# Patient Record
Sex: MALE | Race: BLACK OR AFRICAN AMERICAN | Employment: FULL TIME | URBAN - METROPOLITAN AREA
[De-identification: names, ages, dates, MRNs, and addresses within clinical notes are randomized per-mention and may not be internally consistent; named-entity substitution may affect disease eponyms.]

---

## 2021-01-19 ENCOUNTER — HOSPITAL ENCOUNTER (EMERGENCY)
Age: 46
Discharge: HOME | End: 2021-01-19
Attending: EMERGENCY MEDICINE

## 2021-01-19 VITALS
TEMPERATURE: 97.9 F | BODY MASS INDEX: 32.95 KG/M2 | OXYGEN SATURATION: 100 % | WEIGHT: 205 LBS | SYSTOLIC BLOOD PRESSURE: 130 MMHG | HEIGHT: 66 IN | HEART RATE: 96 BPM | DIASTOLIC BLOOD PRESSURE: 79 MMHG | RESPIRATION RATE: 11 BRPM

## 2021-01-19 DIAGNOSIS — E86.1 HYPOTENSION DUE TO HYPOVOLEMIA: ICD-10-CM

## 2021-01-19 DIAGNOSIS — R11.2 NAUSEA VOMITING AND DIARRHEA: Primary | ICD-10-CM

## 2021-01-19 DIAGNOSIS — E86.0 DEHYDRATION: ICD-10-CM

## 2021-01-19 DIAGNOSIS — R55 SYNCOPE AND COLLAPSE: ICD-10-CM

## 2021-01-19 DIAGNOSIS — I95.89 HYPOTENSION DUE TO HYPOVOLEMIA: ICD-10-CM

## 2021-01-19 DIAGNOSIS — R19.7 NAUSEA VOMITING AND DIARRHEA: Primary | ICD-10-CM

## 2021-01-19 LAB
ALBUMIN SERPL-MCNC: 3.6 G/DL (ref 3.4–5)
ALBUMIN/GLOB SERPL: 0.9 {RATIO} (ref 0.8–1.7)
ALP SERPL-CCNC: 118 U/L (ref 45–117)
ALT SERPL-CCNC: 30 U/L (ref 16–61)
ANION GAP SERPL CALC-SCNC: 3 MMOL/L (ref 3–18)
APPEARANCE UR: CLEAR
AST SERPL-CCNC: 16 U/L (ref 10–38)
ATRIAL RATE: 111 BPM
BASOPHILS # BLD: 0 K/UL (ref 0–0.1)
BASOPHILS NFR BLD: 0 % (ref 0–2)
BILIRUB SERPL-MCNC: 0.4 MG/DL (ref 0.2–1)
BILIRUB UR QL: NEGATIVE
BUN SERPL-MCNC: 14 MG/DL (ref 7–18)
BUN/CREAT SERPL: 15 (ref 12–20)
CALCIUM SERPL-MCNC: 8.9 MG/DL (ref 8.5–10.1)
CALCULATED P AXIS, ECG09: 63 DEGREES
CALCULATED R AXIS, ECG10: 12 DEGREES
CALCULATED T AXIS, ECG11: 79 DEGREES
CHLORIDE SERPL-SCNC: 106 MMOL/L (ref 100–111)
CK MB CFR SERPL CALC: NORMAL % (ref 0–4)
CK MB SERPL-MCNC: <1 NG/ML (ref 5–25)
CK SERPL-CCNC: 123 U/L (ref 39–308)
CO2 SERPL-SCNC: 30 MMOL/L (ref 21–32)
COLOR UR: YELLOW
CREAT SERPL-MCNC: 0.94 MG/DL (ref 0.6–1.3)
DIAGNOSIS, 93000: NORMAL
DIFFERENTIAL METHOD BLD: ABNORMAL
EOSINOPHIL # BLD: 0.1 K/UL (ref 0–0.4)
EOSINOPHIL NFR BLD: 1 % (ref 0–5)
ERYTHROCYTE [DISTWIDTH] IN BLOOD BY AUTOMATED COUNT: 12.8 % (ref 11.6–14.5)
GLOBULIN SER CALC-MCNC: 3.8 G/DL (ref 2–4)
GLUCOSE BLD STRIP.AUTO-MCNC: 268 MG/DL (ref 70–110)
GLUCOSE SERPL-MCNC: 238 MG/DL (ref 74–99)
GLUCOSE UR STRIP.AUTO-MCNC: 500 MG/DL
HCT VFR BLD AUTO: 46.9 % (ref 36–48)
HGB BLD-MCNC: 16.3 G/DL (ref 13–16)
HGB UR QL STRIP: NEGATIVE
KETONES UR QL STRIP.AUTO: NEGATIVE MG/DL
LEUKOCYTE ESTERASE UR QL STRIP.AUTO: NEGATIVE
LYMPHOCYTES # BLD: 2 K/UL (ref 0.9–3.6)
LYMPHOCYTES NFR BLD: 38 % (ref 21–52)
MAGNESIUM SERPL-MCNC: 2.2 MG/DL (ref 1.6–2.6)
MCH RBC QN AUTO: 28 PG (ref 24–34)
MCHC RBC AUTO-ENTMCNC: 34.8 G/DL (ref 31–37)
MCV RBC AUTO: 80.4 FL (ref 74–97)
MONOCYTES # BLD: 0.5 K/UL (ref 0.05–1.2)
MONOCYTES NFR BLD: 9 % (ref 3–10)
NEUTS SEG # BLD: 2.6 K/UL (ref 1.8–8)
NEUTS SEG NFR BLD: 52 % (ref 40–73)
NITRITE UR QL STRIP.AUTO: NEGATIVE
P-R INTERVAL, ECG05: 142 MS
PH UR STRIP: 5 [PH] (ref 5–8)
PLATELET # BLD AUTO: 258 K/UL (ref 135–420)
PMV BLD AUTO: 9.9 FL (ref 9.2–11.8)
POTASSIUM SERPL-SCNC: 4.2 MMOL/L (ref 3.5–5.5)
PROT SERPL-MCNC: 7.4 G/DL (ref 6.4–8.2)
PROT UR STRIP-MCNC: NEGATIVE MG/DL
Q-T INTERVAL, ECG07: 318 MS
QRS DURATION, ECG06: 76 MS
QTC CALCULATION (BEZET), ECG08: 432 MS
RBC # BLD AUTO: 5.83 M/UL (ref 4.7–5.5)
SODIUM SERPL-SCNC: 139 MMOL/L (ref 136–145)
SP GR UR REFRACTOMETRY: 1.02 (ref 1–1.03)
TROPONIN I SERPL-MCNC: <0.02 NG/ML (ref 0–0.04)
UROBILINOGEN UR QL STRIP.AUTO: 0.2 EU/DL (ref 0.2–1)
VENTRICULAR RATE, ECG03: 111 BPM
WBC # BLD AUTO: 5.1 K/UL (ref 4.6–13.2)

## 2021-01-19 PROCEDURE — 82962 GLUCOSE BLOOD TEST: CPT

## 2021-01-19 PROCEDURE — 80053 COMPREHEN METABOLIC PANEL: CPT

## 2021-01-19 PROCEDURE — 96361 HYDRATE IV INFUSION ADD-ON: CPT

## 2021-01-19 PROCEDURE — 82553 CREATINE MB FRACTION: CPT

## 2021-01-19 PROCEDURE — 96374 THER/PROPH/DIAG INJ IV PUSH: CPT

## 2021-01-19 PROCEDURE — 93005 ELECTROCARDIOGRAM TRACING: CPT

## 2021-01-19 PROCEDURE — 99285 EMERGENCY DEPT VISIT HI MDM: CPT

## 2021-01-19 PROCEDURE — 81003 URINALYSIS AUTO W/O SCOPE: CPT

## 2021-01-19 PROCEDURE — 74011250636 HC RX REV CODE- 250/636: Performed by: EMERGENCY MEDICINE

## 2021-01-19 PROCEDURE — 85025 COMPLETE CBC W/AUTO DIFF WBC: CPT

## 2021-01-19 PROCEDURE — 83735 ASSAY OF MAGNESIUM: CPT

## 2021-01-19 RX ORDER — ONDANSETRON 8 MG/1
8 TABLET, ORALLY DISINTEGRATING ORAL
Qty: 12 TAB | Refills: 0 | Status: SHIPPED | OUTPATIENT
Start: 2021-01-19 | End: 2022-06-21

## 2021-01-19 RX ORDER — ONDANSETRON 2 MG/ML
4 INJECTION INTRAMUSCULAR; INTRAVENOUS
Status: COMPLETED | OUTPATIENT
Start: 2021-01-19 | End: 2021-01-19

## 2021-01-19 RX ORDER — METFORMIN HYDROCHLORIDE 1000 MG/1
1000 TABLET ORAL 2 TIMES DAILY WITH MEALS
COMMUNITY
End: 2022-01-12

## 2021-01-19 RX ADMIN — ONDANSETRON 4 MG: 2 INJECTION INTRAMUSCULAR; INTRAVENOUS at 16:27

## 2021-01-19 RX ADMIN — SODIUM CHLORIDE 1000 ML: 900 INJECTION, SOLUTION INTRAVENOUS at 14:49

## 2021-01-19 NOTE — ED NOTES
Pt given discharge paperwork by RN, pt verbalizes understanding, pt ambulatory out of ED. PIV taken prior to pt discharge.

## 2021-01-19 NOTE — ED PROVIDER NOTES
EMERGENCY DEPARTMENT HISTORY AND PHYSICAL EXAM    Date: 1/19/2021  Patient Name: Nataly Carl    History of Presenting Illness     Chief Complaint   Patient presents with    Hypertension    High Blood Sugar         History Provided By: Patient    2:16 PM  Nataly Carl is a 39 y.o. male with PMHX of diabetes who presents to the emergency department C/O lightheadedness and syncopal episode. Per patient starting on Friday he felt lightheaded and unwell. He reports Saturday he started having nausea and vomiting and diarrhea. He reports he is continued to feel lightheaded when he stands up and today when he stood up after resting he fainted briefly. He denies any headache, chest pain, shortness of breath, fever, cough, abdominal pain. No known sick contacts or recent travel. Reports has been taking his medications as prescribed. No other exacerbating or relieving factors identified. PCP: None    Current Outpatient Medications   Medication Sig Dispense Refill    metFORMIN (GLUCOPHAGE) 1,000 mg tablet Take 1,000 mg by mouth two (2) times daily (with meals).  ondansetron (ZOFRAN ODT) 8 mg disintegrating tablet Take 1 Tab by mouth every eight (8) hours as needed for Nausea for up to 12 doses. 12 Tab 0       Past History     Past Medical History:  Past Medical History:   Diagnosis Date    Diabetes ([address]     Hypertension        Past Surgical History:  History reviewed. No pertinent surgical history. Family History:  History reviewed. No pertinent family history. Social History:  Social History     Tobacco Use    Smoking status: Never Smoker   Substance Use Topics    Alcohol use: Never     Frequency: Never    Drug use: Never       Allergies:  No Known Allergies      Review of Systems   Review of Systems   Constitutional: Negative for fever. Respiratory: Negative for shortness of breath. Cardiovascular: Negative for chest pain. Gastrointestinal: Positive for diarrhea, nausea and vomiting. Negative for abdominal pain. Neurological: Positive for syncope and light-headedness. All other systems reviewed and are negative.         Physical Exam     Vitals:    01/19/21 1405 01/19/21 1444 01/19/21 1445   BP: 139/81 128/79 127/81   Pulse: (!) 109 (!) 101 (!) 105   Resp: 16 22 20   Temp: 97.9 °F (36.6 °C)     SpO2: 97% 97% 97%   Weight: 93 kg (205 lb)     Height: 5' 6\" (1.676 m)       Physical Exam    Nursing notes and vital signs reviewed    Constitutional: Non toxic appearing, moderate distress  Head: Normocephalic, Atraumatic  Eyes: EOMI  Neck: Supple  Cardiovascular: Tachycardic and regular rhythm, no murmurs, rubs, or gallops  Chest: Normal work of breathing and chest excursion bilaterally  Lungs: Clear to ausculation bilaterally  Abdomen: Soft, non tender, non distended, normoactive bowel sounds  Back: No evidence of trauma or deformity  Extremities: No evidence of trauma or deformity, no LE edema  Skin: Warm and dry, normal cap refill  Neuro: Alert and appropriate, CN intact, normal speech, strength and sensation full and symmetric bilaterally, normal coordination  Psychiatric: Normal mood and affect      Diagnostic Study Results     Labs -     Recent Results (from the past 12 hour(s))   EKG, 12 LEAD, INITIAL    Collection Time: 01/19/21  2:19 PM   Result Value Ref Range    Ventricular Rate 111 BPM    Atrial Rate 111 BPM    P-R Interval 142 ms    QRS Duration 76 ms    Q-T Interval 318 ms    QTC Calculation (Bezet) 432 ms    Calculated P Axis 63 degrees    Calculated R Axis 12 degrees    Calculated T Axis 79 degrees    Diagnosis       Sinus tachycardia  Otherwise normal ECG  No previous ECGs available     GLUCOSE, POC    Collection Time: 01/19/21  2:24 PM   Result Value Ref Range    Glucose (POC) 268 (H) 70 - 110 mg/dL   CBC WITH AUTOMATED DIFF    Collection Time: 01/19/21  2:30 PM   Result Value Ref Range    WBC 5.1 4.6 - 13.2 K/uL    RBC 5.83 (H) 4.70 - 5.50 M/uL    HGB 16.3 (H) 13.0 - 16.0 g/dL HCT 46.9 36.0 - 48.0 %    MCV 80.4 74.0 - 97.0 FL    MCH 28.0 24.0 - 34.0 PG    MCHC 34.8 31.0 - 37.0 g/dL    RDW 12.8 11.6 - 14.5 %    PLATELET 999 699 - 377 K/uL    MPV 9.9 9.2 - 11.8 FL    NEUTROPHILS 52 40 - 73 %    LYMPHOCYTES 38 21 - 52 %    MONOCYTES 9 3 - 10 %    EOSINOPHILS 1 0 - 5 %    BASOPHILS 0 0 - 2 %    ABS. NEUTROPHILS 2.6 1.8 - 8.0 K/UL    ABS. LYMPHOCYTES 2.0 0.9 - 3.6 K/UL    ABS. MONOCYTES 0.5 0.05 - 1.2 K/UL    ABS. EOSINOPHILS 0.1 0.0 - 0.4 K/UL    ABS. BASOPHILS 0.0 0.0 - 0.1 K/UL    DF AUTOMATED     METABOLIC PANEL, COMPREHENSIVE    Collection Time: 01/19/21  2:30 PM   Result Value Ref Range    Sodium 139 136 - 145 mmol/L    Potassium 4.2 3.5 - 5.5 mmol/L    Chloride 106 100 - 111 mmol/L    CO2 30 21 - 32 mmol/L    Anion gap 3 3.0 - 18 mmol/L    Glucose 238 (H) 74 - 99 mg/dL    BUN 14 7.0 - 18 MG/DL    Creatinine 0.94 0.6 - 1.3 MG/DL    BUN/Creatinine ratio 15 12 - 20      GFR est AA >60 >60 ml/min/1.73m2    GFR est non-AA >60 >60 ml/min/1.73m2    Calcium 8.9 8.5 - 10.1 MG/DL    Bilirubin, total 0.4 0.2 - 1.0 MG/DL    ALT (SGPT) 30 16 - 61 U/L    AST (SGOT) 16 10 - 38 U/L    Alk.  phosphatase 118 (H) 45 - 117 U/L    Protein, total 7.4 6.4 - 8.2 g/dL    Albumin 3.6 3.4 - 5.0 g/dL    Globulin 3.8 2.0 - 4.0 g/dL    A-G Ratio 0.9 0.8 - 1.7     MAGNESIUM    Collection Time: 01/19/21  2:30 PM   Result Value Ref Range    Magnesium 2.2 1.6 - 2.6 mg/dL   CARDIAC PANEL,(CK, CKMB & TROPONIN)    Collection Time: 01/19/21  2:30 PM   Result Value Ref Range    CK - MB <1.0 <3.6 ng/ml    CK-MB Index  0.0 - 4.0 %     CALCULATION NOT PERFORMED WHEN RESULT IS BELOW LINEAR LIMIT     39 - 308 U/L    Troponin-I, QT <0.02 0.0 - 0.045 NG/ML   URINALYSIS W/ RFLX MICROSCOPIC    Collection Time: 01/19/21  2:47 PM   Result Value Ref Range    Color YELLOW      Appearance CLEAR      Specific gravity 1.016 1.005 - 1.030      pH (UA) 5.0 5.0 - 8.0      Protein Negative NEG mg/dL    Glucose 500 (A) NEG mg/dL    Ketone Negative NEG mg/dL    Bilirubin Negative NEG      Blood Negative NEG      Urobilinogen 0.2 0.2 - 1.0 EU/dL    Nitrites Negative NEG      Leukocyte Esterase Negative NEG         Radiologic Studies -   No orders to display     CT Results  (Last 48 hours)    None        CXR Results  (Last 48 hours)    None          Medications given in the ED-  Medications   sodium chloride 0.9 % bolus infusion 1,000 mL (1,000 mL IntraVENous New Bag 1/19/21 1449)     Followed by   sodium chloride 0.9 % bolus infusion 1,000 mL (1,000 mL IntraVENous New Bag 1/19/21 1449)   ondansetron (ZOFRAN) injection 4 mg (4 mg IntraVENous Given 1/19/21 1627)         Medical Decision Making   I am the first provider for this patient. I reviewed the vital signs, available nursing notes, past medical history, past surgical history, family history and social history. Vital Signs-Reviewed the patient's vital signs. Pulse Oximetry Analysis - 97% on room air, not hypoxic     Cardiac Monitor:  Rate: 94 bpm  Rhythm: Normal sinus    EKG interpretation: (Preliminary)  EKG read by Dr. Taqueria Garcia at 2:22 PM  Sinus tachycardia at a rate of 111 bpm, CO interval 142 ms, QRS duration 76 ms, no prior available for comparison    Records Reviewed: Nursing Notes    Provider Notes (Medical Decision Making): Hank Medina is a 39 y.o. male presenting with 3 days of nausea, vomiting, diarrhea and a syncopal episode today. Patient orthostatic here consistent with dehydration from his nausea vomiting and diarrhea. Benign abdominal exam. Tolerating p.o. without difficulty. Rehydrated here with normalization of his heart rate. Plan for discharge with symptom management, encouragement of p.o. fluids, early primary care follow-up, and strict return precautions. Patient understands and agrees with this plan. Procedures:  Procedures    ED Course:   4:36 PM  Updated patient on all results and plan. All questions answered.     Diagnosis and Disposition     Critical Care: None    DISCHARGE NOTE:    Gordon Varghese's  results have been reviewed with him. He has been counseled regarding his diagnosis, treatment, and plan. He verbally conveys understanding and agreement of the signs, symptoms, diagnosis, treatment and prognosis and additionally agrees to follow up as discussed. He also agrees with the care-plan and conveys that all of his questions have been answered. I have also provided discharge instructions for him that include: educational information regarding their diagnosis and treatment, and list of reasons why they would want to return to the ED prior to their follow-up appointment, should his condition change. He has been provided with education for proper emergency department utilization. CLINICAL IMPRESSION:    1. Nausea vomiting and diarrhea    2. Syncope and collapse    3. Hypotension due to hypovolemia    4. Dehydration        PLAN:  1. D/C Home  2. Current Discharge Medication List      START taking these medications    Details   ondansetron (ZOFRAN ODT) 8 mg disintegrating tablet Take 1 Tab by mouth every eight (8) hours as needed for Nausea for up to 12 doses. Qty: 12 Tab, Refills: 0           3. Follow-up Information     Follow up With Specialties Details Why Contact Info    [address] [place] [address]  Schedule an appointment as soon as possible for a visit   [id number] [facility], [address] [facility] [address]    [facility] EMERGENCY DEPT Emergency Medicine  If symptoms worsen [address] [id number]  [phone]        _______________________________      Please note that this dictation was completed with [employer], the [employer] voice recognition software. Quite often unanticipated grammatical, syntax, homophones, and other interpretive errors are inadvertently transcribed by the computer software. Please disregard these errors. Please excuse any errors that have escaped final proofreading.

## 2021-01-19 NOTE — LETTER
[facility] [place] 
[facility] 
[address] [address] [phone] Work/School Note Date: 1/19/2021 To Whom It May concern: 
 
Natty Nunez was seen and treated today in the emergency room by the following provider(s): 
Attending Provider: Syl De Oliveira MD. Natty Nunez may return to work on 1/22/2021.  
 
Sincerely, 
 
 
 
 
Mariusz Summers RN

## 2021-12-30 ENCOUNTER — APPOINTMENT (OUTPATIENT)
Dept: GENERAL RADIOLOGY | Age: 46
DRG: 073 | End: 2021-12-30
Attending: STUDENT IN AN ORGANIZED HEALTH CARE EDUCATION/TRAINING PROGRAM
Payer: COMMERCIAL

## 2021-12-30 ENCOUNTER — APPOINTMENT (OUTPATIENT)
Dept: CT IMAGING | Age: 46
DRG: 073 | End: 2021-12-30
Attending: PHYSICIAN ASSISTANT
Payer: COMMERCIAL

## 2021-12-30 ENCOUNTER — HOSPITAL ENCOUNTER (INPATIENT)
Age: 46
LOS: 13 days | Discharge: HOME | DRG: 073 | End: 2022-01-12
Attending: STUDENT IN AN ORGANIZED HEALTH CARE EDUCATION/TRAINING PROGRAM | Admitting: INTERNAL MEDICINE
Payer: COMMERCIAL

## 2021-12-30 DIAGNOSIS — R10.84 DIFFUSE ABDOMINAL PAIN: ICD-10-CM

## 2021-12-30 DIAGNOSIS — N17.9 ACUTE KIDNEY INJURY (HCC): Primary | ICD-10-CM

## 2021-12-30 DIAGNOSIS — E11.65 TYPE 2 DIABETES MELLITUS WITH HYPERGLYCEMIA, WITH LONG-TERM CURRENT USE OF INSULIN (HCC): ICD-10-CM

## 2021-12-30 DIAGNOSIS — Z79.4 TYPE 2 DIABETES MELLITUS WITH HYPERGLYCEMIA, WITH LONG-TERM CURRENT USE OF INSULIN (HCC): ICD-10-CM

## 2021-12-30 DIAGNOSIS — E11.42 DIABETIC POLYNEUROPATHY ASSOCIATED WITH TYPE 2 DIABETES MELLITUS (HCC): ICD-10-CM

## 2021-12-30 DIAGNOSIS — K52.9 COLITIS: ICD-10-CM

## 2021-12-30 PROBLEM — R19.7 DIARRHEA: Status: ACTIVE | Noted: 2021-12-30

## 2021-12-30 PROBLEM — R11.2 NAUSEA & VOMITING: Status: ACTIVE | Noted: 2021-12-30

## 2021-12-30 PROBLEM — E11.9 DIABETES MELLITUS (HCC): Status: ACTIVE | Noted: 2021-12-30

## 2021-12-30 PROBLEM — E11.9 DM (DIABETES MELLITUS) (HCC): Status: ACTIVE | Noted: 2021-12-30

## 2021-12-30 LAB
ALBUMIN SERPL-MCNC: 3.7 G/DL (ref 3.4–5)
ALBUMIN/GLOB SERPL: 0.9 {RATIO} (ref 0.8–1.7)
ALP SERPL-CCNC: 110 U/L (ref 45–117)
ALT SERPL-CCNC: 30 U/L (ref 16–61)
AMPHET UR QL SCN: NEGATIVE
ANION GAP SERPL CALC-SCNC: 10 MMOL/L (ref 3–18)
APPEARANCE UR: CLEAR
AST SERPL-CCNC: 15 U/L (ref 10–38)
BARBITURATES UR QL SCN: NEGATIVE
BASOPHILS # BLD: 0 K/UL (ref 0–0.1)
BASOPHILS NFR BLD: 0 % (ref 0–2)
BENZODIAZ UR QL: NEGATIVE
BILIRUB SERPL-MCNC: 0.6 MG/DL (ref 0.2–1)
BILIRUB UR QL: NEGATIVE
BUN SERPL-MCNC: 35 MG/DL (ref 7–18)
BUN/CREAT SERPL: 10 (ref 12–20)
CALCIUM SERPL-MCNC: 9.3 MG/DL (ref 8.5–10.1)
CANNABINOIDS UR QL SCN: NEGATIVE
CHLORIDE SERPL-SCNC: 106 MMOL/L (ref 100–111)
CO2 SERPL-SCNC: 22 MMOL/L (ref 21–32)
COCAINE UR QL SCN: NEGATIVE
COLOR UR: YELLOW
COVID-19 RAPID TEST, COVR: NOT DETECTED
CREAT SERPL-MCNC: 3.47 MG/DL (ref 0.6–1.3)
DIFFERENTIAL METHOD BLD: ABNORMAL
EOSINOPHIL # BLD: 0 K/UL (ref 0–0.4)
EOSINOPHIL NFR BLD: 0 % (ref 0–5)
ERYTHROCYTE [DISTWIDTH] IN BLOOD BY AUTOMATED COUNT: 12.7 % (ref 11.6–14.5)
EST. AVERAGE GLUCOSE BLD GHB EST-MCNC: 235 MG/DL
GLOBULIN SER CALC-MCNC: 4 G/DL (ref 2–4)
GLUCOSE BLD STRIP.AUTO-MCNC: 220 MG/DL (ref 70–110)
GLUCOSE SERPL-MCNC: 265 MG/DL (ref 74–99)
GLUCOSE UR STRIP.AUTO-MCNC: 500 MG/DL
HBA1C MFR BLD: 9.8 % (ref 4.2–5.6)
HCT VFR BLD AUTO: 42 % (ref 36–48)
HDSCOM,HDSCOM: ABNORMAL
HGB BLD-MCNC: 14.4 G/DL (ref 13–16)
HGB UR QL STRIP: NEGATIVE
IMM GRANULOCYTES # BLD AUTO: 0.1 K/UL (ref 0–0.04)
IMM GRANULOCYTES NFR BLD AUTO: 0 % (ref 0–0.5)
KETONES UR QL STRIP.AUTO: 15 MG/DL
LEUKOCYTE ESTERASE UR QL STRIP.AUTO: NEGATIVE
LIPASE SERPL-CCNC: 75 U/L (ref 73–393)
LYMPHOCYTES # BLD: 1.2 K/UL (ref 0.9–3.6)
LYMPHOCYTES NFR BLD: 11 % (ref 21–52)
MCH RBC QN AUTO: 26.7 PG (ref 24–34)
MCHC RBC AUTO-ENTMCNC: 34.3 G/DL (ref 31–37)
MCV RBC AUTO: 77.8 FL (ref 78–100)
METHADONE UR QL: NEGATIVE
MONOCYTES # BLD: 0.4 K/UL (ref 0.05–1.2)
MONOCYTES NFR BLD: 3 % (ref 3–10)
NEUTS SEG # BLD: 9.7 K/UL (ref 1.8–8)
NEUTS SEG NFR BLD: 85 % (ref 40–73)
NITRITE UR QL STRIP.AUTO: NEGATIVE
NRBC # BLD: 0 K/UL (ref 0–0.01)
NRBC BLD-RTO: 0 PER 100 WBC
OPIATES UR QL: POSITIVE
PCP UR QL: NEGATIVE
PH UR STRIP: 5 [PH] (ref 5–8)
PLATELET # BLD AUTO: 315 K/UL (ref 135–420)
PMV BLD AUTO: 9.7 FL (ref 9.2–11.8)
POTASSIUM SERPL-SCNC: 4.3 MMOL/L (ref 3.5–5.5)
PROT SERPL-MCNC: 7.7 G/DL (ref 6.4–8.2)
PROT UR STRIP-MCNC: NEGATIVE MG/DL
RBC # BLD AUTO: 5.4 M/UL (ref 4.35–5.65)
SODIUM SERPL-SCNC: 138 MMOL/L (ref 136–145)
SOURCE, COVRS: NORMAL
SP GR UR REFRACTOMETRY: 1.01 (ref 1–1.03)
TROPONIN-HIGH SENSITIVITY: 6 NG/L (ref 0–78)
UROBILINOGEN UR QL STRIP.AUTO: 0.2 EU/DL (ref 0.2–1)
WBC # BLD AUTO: 11.3 K/UL (ref 4.6–13.2)

## 2021-12-30 PROCEDURE — 96374 THER/PROPH/DIAG INJ IV PUSH: CPT

## 2021-12-30 PROCEDURE — 74011250636 HC RX REV CODE- 250/636: Performed by: INTERNAL MEDICINE

## 2021-12-30 PROCEDURE — 80053 COMPREHEN METABOLIC PANEL: CPT

## 2021-12-30 PROCEDURE — 65270000029 HC RM PRIVATE

## 2021-12-30 PROCEDURE — 74011000250 HC RX REV CODE- 250: Performed by: INTERNAL MEDICINE

## 2021-12-30 PROCEDURE — 87635 SARS-COV-2 COVID-19 AMP PRB: CPT

## 2021-12-30 PROCEDURE — 84484 ASSAY OF TROPONIN QUANT: CPT

## 2021-12-30 PROCEDURE — 83036 HEMOGLOBIN GLYCOSYLATED A1C: CPT

## 2021-12-30 PROCEDURE — 74011250637 HC RX REV CODE- 250/637: Performed by: INTERNAL MEDICINE

## 2021-12-30 PROCEDURE — 80307 DRUG TEST PRSMV CHEM ANLYZR: CPT

## 2021-12-30 PROCEDURE — 85025 COMPLETE CBC W/AUTO DIFF WBC: CPT

## 2021-12-30 PROCEDURE — 71046 X-RAY EXAM CHEST 2 VIEWS: CPT

## 2021-12-30 PROCEDURE — 99285 EMERGENCY DEPT VISIT HI MDM: CPT

## 2021-12-30 PROCEDURE — 96375 TX/PRO/DX INJ NEW DRUG ADDON: CPT

## 2021-12-30 PROCEDURE — 74011250636 HC RX REV CODE- 250/636: Performed by: PHYSICIAN ASSISTANT

## 2021-12-30 PROCEDURE — 74176 CT ABD & PELVIS W/O CONTRAST: CPT

## 2021-12-30 PROCEDURE — 96361 HYDRATE IV INFUSION ADD-ON: CPT

## 2021-12-30 PROCEDURE — 82962 GLUCOSE BLOOD TEST: CPT

## 2021-12-30 PROCEDURE — 96376 TX/PRO/DX INJ SAME DRUG ADON: CPT

## 2021-12-30 PROCEDURE — 74011636637 HC RX REV CODE- 636/637: Performed by: INTERNAL MEDICINE

## 2021-12-30 PROCEDURE — 81003 URINALYSIS AUTO W/O SCOPE: CPT

## 2021-12-30 PROCEDURE — C9113 INJ PANTOPRAZOLE SODIUM, VIA: HCPCS | Performed by: INTERNAL MEDICINE

## 2021-12-30 PROCEDURE — 93005 ELECTROCARDIOGRAM TRACING: CPT

## 2021-12-30 PROCEDURE — 83690 ASSAY OF LIPASE: CPT

## 2021-12-30 RX ORDER — SODIUM CHLORIDE 0.9 % (FLUSH) 0.9 %
5-40 SYRINGE (ML) INJECTION AS NEEDED
Status: DISCONTINUED | OUTPATIENT
Start: 2021-12-30 | End: 2022-01-12 | Stop reason: HOSPADM

## 2021-12-30 RX ORDER — DEXTROSE 50 % IN WATER (D50W) INTRAVENOUS SYRINGE
25-50 AS NEEDED
Status: DISCONTINUED | OUTPATIENT
Start: 2021-12-30 | End: 2022-01-12 | Stop reason: HOSPADM

## 2021-12-30 RX ORDER — MAGNESIUM SULFATE 100 %
4 CRYSTALS MISCELLANEOUS AS NEEDED
Status: DISCONTINUED | OUTPATIENT
Start: 2021-12-30 | End: 2022-01-12 | Stop reason: HOSPADM

## 2021-12-30 RX ORDER — MORPHINE SULFATE 4 MG/ML
4 INJECTION INTRAVENOUS
Status: COMPLETED | OUTPATIENT
Start: 2021-12-30 | End: 2021-12-30

## 2021-12-30 RX ORDER — ONDANSETRON 2 MG/ML
4 INJECTION INTRAMUSCULAR; INTRAVENOUS
Status: DISCONTINUED | OUTPATIENT
Start: 2021-12-30 | End: 2022-01-12 | Stop reason: HOSPADM

## 2021-12-30 RX ORDER — CIPROFLOXACIN 2 MG/ML
400 INJECTION, SOLUTION INTRAVENOUS EVERY 12 HOURS
Status: DISCONTINUED | OUTPATIENT
Start: 2021-12-30 | End: 2022-01-02

## 2021-12-30 RX ORDER — ONDANSETRON 2 MG/ML
4 INJECTION INTRAMUSCULAR; INTRAVENOUS
Status: COMPLETED | OUTPATIENT
Start: 2021-12-30 | End: 2021-12-30

## 2021-12-30 RX ORDER — SODIUM CHLORIDE 0.9 % (FLUSH) 0.9 %
5-40 SYRINGE (ML) INJECTION EVERY 8 HOURS
Status: DISCONTINUED | OUTPATIENT
Start: 2021-12-30 | End: 2022-01-12 | Stop reason: HOSPADM

## 2021-12-30 RX ORDER — INSULIN LISPRO 100 [IU]/ML
INJECTION, SOLUTION INTRAVENOUS; SUBCUTANEOUS
Status: DISCONTINUED | OUTPATIENT
Start: 2021-12-30 | End: 2022-01-12 | Stop reason: HOSPADM

## 2021-12-30 RX ORDER — PANTOPRAZOLE SODIUM 40 MG/10ML
40 INJECTION, POWDER, LYOPHILIZED, FOR SOLUTION INTRAVENOUS EVERY 12 HOURS
Status: DISCONTINUED | OUTPATIENT
Start: 2021-12-30 | End: 2022-01-03 | Stop reason: ALTCHOICE

## 2021-12-30 RX ORDER — SODIUM CHLORIDE 9 MG/ML
150 INJECTION, SOLUTION INTRAVENOUS ONCE
Status: DISCONTINUED | OUTPATIENT
Start: 2021-12-30 | End: 2021-12-30

## 2021-12-30 RX ORDER — ACETAMINOPHEN 325 MG/1
650 TABLET ORAL
Status: DISCONTINUED | OUTPATIENT
Start: 2021-12-30 | End: 2022-01-12 | Stop reason: HOSPADM

## 2021-12-30 RX ORDER — GABAPENTIN 300 MG/1
300 CAPSULE ORAL 2 TIMES DAILY
Status: DISCONTINUED | OUTPATIENT
Start: 2021-12-31 | End: 2022-01-12

## 2021-12-30 RX ORDER — METOCLOPRAMIDE HYDROCHLORIDE 5 MG/ML
10 INJECTION INTRAMUSCULAR; INTRAVENOUS
Status: COMPLETED | OUTPATIENT
Start: 2021-12-30 | End: 2021-12-30

## 2021-12-30 RX ORDER — ACETAMINOPHEN 650 MG/1
650 SUPPOSITORY RECTAL
Status: DISCONTINUED | OUTPATIENT
Start: 2021-12-30 | End: 2022-01-12 | Stop reason: HOSPADM

## 2021-12-30 RX ORDER — SODIUM CHLORIDE 9 MG/ML
75 INJECTION, SOLUTION INTRAVENOUS CONTINUOUS
Status: DISCONTINUED | OUTPATIENT
Start: 2021-12-30 | End: 2022-01-10

## 2021-12-30 RX ORDER — ONDANSETRON 4 MG/1
4 TABLET, ORALLY DISINTEGRATING ORAL
Status: DISCONTINUED | OUTPATIENT
Start: 2021-12-30 | End: 2022-01-12 | Stop reason: HOSPADM

## 2021-12-30 RX ORDER — ENOXAPARIN SODIUM 100 MG/ML
30 INJECTION SUBCUTANEOUS DAILY
Status: DISCONTINUED | OUTPATIENT
Start: 2021-12-31 | End: 2022-01-10

## 2021-12-30 RX ORDER — METOCLOPRAMIDE HYDROCHLORIDE 5 MG/ML
5 INJECTION INTRAMUSCULAR; INTRAVENOUS EVERY 6 HOURS
Status: DISCONTINUED | OUTPATIENT
Start: 2021-12-30 | End: 2021-12-31

## 2021-12-30 RX ORDER — MORPHINE SULFATE 2 MG/ML
1 INJECTION, SOLUTION INTRAMUSCULAR; INTRAVENOUS
Status: DISCONTINUED | OUTPATIENT
Start: 2021-12-30 | End: 2021-12-31

## 2021-12-30 RX ORDER — INSULIN GLARGINE 100 [IU]/ML
20 INJECTION, SOLUTION SUBCUTANEOUS
Status: DISCONTINUED | OUTPATIENT
Start: 2021-12-31 | End: 2022-01-05

## 2021-12-30 RX ORDER — POLYETHYLENE GLYCOL 3350 17 G/17G
17 POWDER, FOR SOLUTION ORAL DAILY PRN
Status: DISCONTINUED | OUTPATIENT
Start: 2021-12-30 | End: 2022-01-12 | Stop reason: HOSPADM

## 2021-12-30 RX ORDER — FAMOTIDINE 10 MG/ML
20 INJECTION INTRAVENOUS
Status: COMPLETED | OUTPATIENT
Start: 2021-12-30 | End: 2021-12-30

## 2021-12-30 RX ADMIN — MORPHINE SULFATE 1 MG: 2 INJECTION, SOLUTION INTRAMUSCULAR; INTRAVENOUS at 21:33

## 2021-12-30 RX ADMIN — PANTOPRAZOLE SODIUM 40 MG: 40 INJECTION, POWDER, FOR SOLUTION INTRAVENOUS at 21:32

## 2021-12-30 RX ADMIN — INSULIN GLARGINE 20 UNITS: 100 INJECTION, SOLUTION SUBCUTANEOUS at 23:53

## 2021-12-30 RX ADMIN — METOCLOPRAMIDE HYDROCHLORIDE 10 MG: 5 INJECTION INTRAMUSCULAR; INTRAVENOUS at 17:02

## 2021-12-30 RX ADMIN — MORPHINE SULFATE 4 MG: 4 INJECTION INTRAVENOUS at 15:52

## 2021-12-30 RX ADMIN — SODIUM CHLORIDE, PRESERVATIVE FREE 30 ML: 5 INJECTION INTRAVENOUS at 21:33

## 2021-12-30 RX ADMIN — METRONIDAZOLE 250 MG: 500 INJECTION, SOLUTION INTRAVENOUS at 23:02

## 2021-12-30 RX ADMIN — Medication 4 UNITS: at 22:43

## 2021-12-30 RX ADMIN — SODIUM CHLORIDE 150 ML/HR: 9 INJECTION, SOLUTION INTRAVENOUS at 19:00

## 2021-12-30 RX ADMIN — MORPHINE SULFATE 4 MG: 4 INJECTION INTRAVENOUS at 13:58

## 2021-12-30 RX ADMIN — FAMOTIDINE 20 MG: 10 INJECTION, SOLUTION INTRAVENOUS at 13:58

## 2021-12-30 RX ADMIN — METOCLOPRAMIDE HYDROCHLORIDE 5 MG: 5 INJECTION INTRAMUSCULAR; INTRAVENOUS at 19:14

## 2021-12-30 RX ADMIN — SODIUM CHLORIDE 1000 ML: 9 INJECTION, SOLUTION INTRAVENOUS at 13:57

## 2021-12-30 RX ADMIN — METOCLOPRAMIDE HYDROCHLORIDE 5 MG: 5 INJECTION INTRAMUSCULAR; INTRAVENOUS at 23:53

## 2021-12-30 RX ADMIN — CIPROFLOXACIN 400 MG: 2 INJECTION, SOLUTION INTRAVENOUS at 19:00

## 2021-12-30 RX ADMIN — ONDANSETRON 4 MG: 2 INJECTION INTRAMUSCULAR; INTRAVENOUS at 13:58

## 2021-12-30 NOTE — H&P
History & Physical    Patient: Tez Chen MRN: [id number]  CSN: [id number]    YOB: 1975  Age: 42 y.o. Sex: male      DOA: 12/30/2021    Chief Complaint:   Chief Complaint   Patient presents with    Vomiting    Chest Pain          HPI:     Tez Chen is a 42 y.o.  male who has hx of DM, HTN,Neuropathy, presents to ER with   3 day history of nausea, vomiting, and diarrhea. His diarrhea described as mucusy and non bloody; has slowed down over one day but he is unable to keep and liquids or foods down. He is having intractable  Nausea and now with chest pain after throwing up. He thinks he may have choked on vomitus and is having chest pain with inspiration. Patient lives with wife and kids none are sick. He denies recent food poisoning or other sick contacts. In ER given Reglan, Phenergan; Zofran with persistent nausea and inability to keep food down  Patient is double vaccinated from [facility] denies exposures  Rapid covid in ER negative    Past Medical History:   Diagnosis Date    Diabetes ([facility] [address]     Hypertension        History reviewed. No pertinent surgical history. History reviewed. No pertinent family history. Social History     Socioeconomic History    Marital status:    Tobacco Use    Smoking status: Never Smoker   Substance and Sexual Activity    Alcohol use: Never    Drug use: Never       Prior to Admission medications    Medication Sig Start Date End Date Taking? Authorizing Provider   metFORMIN (GLUCOPHAGE) 1,000 mg tablet Take 1,000 mg by mouth two (2) times daily (with meals). Other, MD Rosario   ondansetron (ZOFRAN ODT) 8 mg disintegrating tablet Take 1 Tab by mouth every eight (8) hours as needed for Nausea for up to 12 doses. 1/19/21   Shameka Pearson MD       No Known Allergies      Review of Systems  GENERAL: Patient alert, awake and oriented times 3, able to communicate full sentences and not in distress.    HEENT: No change in vision, no earache, tinnitus, sore throat or sinus congestion. NECK: No pain or stiffness. PULMONARY: No shortness of breath, cough or wheeze. Cardiovascular: no pnd or orthopnea, no CP  GASTROINTESTINAL: No abdominal pain, nausea, vomiting or diarrhea, melena or bright red blood per rectum. GENITOURINARY: No urinary frequency, urgency, hesitancy or dysuria. MUSCULOSKELETAL: No joint or muscle pain, no back pain, no recent trauma. DERMATOLOGIC: No rash, no itching, no lesions. ENDOCRINE: No polyuria, polydipsia, no heat or cold intolerance. No recent change in weight. HEMATOLOGICAL: No anemia or easy bruising or bleeding. NEUROLOGIC: No headache, seizures, numbness, tingling or weakness. Physical Exam:     Physical Exam:  Visit Vitals  /62   Pulse 90   Temp 97.1 °F (36.2 °C)   Resp 16   Ht 5' 6\" (1.676 m)   Wt 90.7 kg (200 lb)   SpO2 91%   BMI 32.28 kg/m²      O2 Device: None (Room air)    Temp (24hrs), Av.1 °F (36.2 °C), Min:97.1 °F (36.2 °C), Max:97.1 °F (36.2 °C)    No intake/output data recorded. No intake/output data recorded. General:  Alert, cooperative, no distress, appears stated age. Head: Normocephalic, without obvious abnormality, atraumatic. Eyes:  Conjunctivae/corneas clear. PERRL, EOMs intact. Nose: Nares normal. No drainage or sinus tenderness. Neck: Supple, symmetrical, trachea midline, no adenopathy, thyroid: no enlargement, no carotid bruit and no JVD. Lungs:   Clear to auscultation bilaterally. Heart:  Regular rate and rhythm, S1, S2 normal.     Abdomen: Soft, non-tender. Bowel sounds normal. No point tenderness mild distension   Extremities: Extremities normal, atraumatic, no cyanosis or edema. Pulses: 2+ and symmetric all extremities. Skin:  No rashes or lesions   Neurologic: AAOx3, No focal motor or sensory deficit. Labs Reviewed: All lab results for the last 24 hours reviewed.    and EKG    Procedures/imaging: see electronic medical records for all procedures/Xrays and details which were not copied into this note but were reviewed prior to creation of Plan      Assessment/Plan     Active Problems:  1. MILADIS start on fluids hold renal toxic agents losartan Metformin  2. Colitis start on full liquid diet advance as tolerated stool cultures ordered by emergency room will start on Cipro and Flagyl once stool is collected can start empiric vancomycin p.o.  3.  Diabetes hold all insulin and placed on sliding scale may have component of gastroparesis  4. Intractable nausea and vomiting started on Protonix Zofran and Phenergan  5.   Atypical chest pain cardiac enzyme is negative admit to telemetry we will check an echo cardiology consult if no improvement of symptoms may trend troponin one more time  He started on IV Protonix for Esophagitis  And may cover for aspiration pneumonitis  6, ?asiration pneumonitis consider switching to zosyn pending cultures etc  DVT/GI Prophylaxis: Jackie Pisano MD  12/30/2021 6:32 PM

## 2021-12-30 NOTE — ED PROVIDER NOTES
EMERGENCY DEPARTMENT HISTORY AND PHYSICAL EXAM    Date: 12/30/2021  Patient Name: Michele Mendes    History of Presenting Illness     Chief Complaint   Patient presents with    Vomiting    Chest Pain       History Provided By: Patient    Additional History (Context):   Michele Mendes is a 42 y.o. male presents to the emergency room with 3 days history of nausea, vomiting and diarrhea as well as upper abdominal pain and chest pain. Patient was brought in by ambulance today due to to his complaints. Chest pain center of his chest with some radiation down towards his upper stomach. Seems to have it worse when he is vomiting. Very nauseated. Vomited multiple times. Also having loose stool watery stools. Some mucus. No blood. No history of any cardiac or abdominal issues. No sick contacts. Denies any family history of inflammatory bowel disease. Typically healthy. Does have a history of hypertension, diabetes, hyperlipidemia. PCP: [place] YATES NP    Current Facility-Administered Medications   Medication Dose Route Frequency Provider Last Rate Last Admin    metoclopramide HCl (REGLAN) injection 10 mg  10 mg IntraVENous NOW KALIN Coello         Current Outpatient Medications   Medication Sig Dispense Refill    metFORMIN (GLUCOPHAGE) 1,000 mg tablet Take 1,000 mg by mouth two (2) times daily (with meals).  ondansetron (ZOFRAN ODT) 8 mg disintegrating tablet Take 1 Tab by mouth every eight (8) hours as needed for Nausea for up to 12 doses. 12 Tab 0       Past History     Past Medical History:  Past Medical History:   Diagnosis Date    Diabetes ([address]     Hypertension        Past Surgical History:  History reviewed. No pertinent surgical history. Family History:  History reviewed. No pertinent family history.     Social History:  Social History     Tobacco Use    Smoking status: Never Smoker    Smokeless tobacco: Not on file   Substance Use Topics    Alcohol use: Never    Drug use: Never       Allergies:  No Known Allergies      Review of Systems   Review of Systems   Constitutional: Positive for chills and fatigue. Negative for fever. HENT: Negative for congestion, rhinorrhea, sinus pressure, sinus pain, sneezing and sore throat. Respiratory: Negative for chest tightness and shortness of breath. Cardiovascular: Positive for chest pain. Negative for palpitations and leg swelling. Gastrointestinal: Positive for abdominal pain, diarrhea, nausea and vomiting. Endocrine: Negative for polydipsia, polyphagia and polyuria. Genitourinary: Negative for decreased urine volume, dysuria, flank pain, frequency, hematuria and urgency. Musculoskeletal: Negative for back pain and myalgias. Neurological: Negative for dizziness, light-headedness and headaches. All other systems reviewed and are negative. Physical Exam     Vitals:    12/30/21 1104 12/30/21 1106 12/30/21 1348   BP:  (!) 178/82    Pulse: 95     Resp: 16     Temp: 97.1 °F (36.2 °C)     SpO2: 100%  97%   Weight: 90.7 kg (200 lb)     Height: 5' 6\" (1.676 m)       Physical Exam  Vitals and nursing note reviewed. Constitutional:       General: He is not in acute distress. Appearance: Normal appearance. He is well-developed and well-groomed. He is not ill-appearing. Comments: Very uncomfortable appearing 68-year-old male. Holding onto his abdomen. Vital signs show him to be minimally hypertensive otherwise stable. Cooperative. HENT:      Mouth/Throat:      Mouth: Mucous membranes are moist.      Pharynx: Oropharynx is clear. Eyes:      General: No scleral icterus. Extraocular Movements: Extraocular movements intact. Conjunctiva/sclera: Conjunctivae normal.      Pupils: Pupils are equal, round, and reactive to light. Cardiovascular:      Rate and Rhythm: Normal rate and regular rhythm. Pulses: Normal pulses. Heart sounds: Normal heart sounds.    Pulmonary:      Effort: Pulmonary effort is normal.      Breath sounds: Normal breath sounds and air entry. Chest:      Chest wall: No tenderness. Abdominal:      General: Abdomen is flat. Bowel sounds are normal.      Palpations: Abdomen is soft. There is no hepatomegaly, splenomegaly or mass. Tenderness: There is abdominal tenderness in the epigastric area. There is guarding. There is no right CVA tenderness, left CVA tenderness or rebound. Negative signs include Benton's sign and McBurney's sign. Comments: Increased tenderness palpation in the epigastric region. The remainder the abdomen is nontender. Musculoskeletal:      Cervical back: Neck supple. Lymphadenopathy:      Cervical: No cervical adenopathy. Skin:     General: Skin is warm and dry. Coloration: Skin is not jaundiced. Neurological:      Mental Status: He is alert and oriented to person, place, and time. Psychiatric:         Mood and Affect: Mood is anxious. Behavior: Behavior normal. Behavior is cooperative.          Nursing note and vitals reviewed         Diagnostic Study Results     Labs -     Recent Results (from the past 12 hour(s))   EKG, 12 LEAD, INITIAL    Collection Time: 12/30/21 11:18 AM   Result Value Ref Range    Ventricular Rate 93 BPM    Atrial Rate 93 BPM    P-R Interval 142 ms    QRS Duration 74 ms    Q-T Interval 334 ms    QTC Calculation (Bezet) 415 ms    Calculated P Axis 66 degrees    Calculated R Axis 23 degrees    Calculated T Axis 76 degrees    Diagnosis       Normal sinus rhythm  Normal ECG  When compared with ECG of 19-JAN-2021 14:19,  No significant change was found     CBC WITH AUTOMATED DIFF    Collection Time: 12/30/21 11:35 AM   Result Value Ref Range    WBC 11.3 4.6 - 13.2 K/uL    RBC 5.40 4.35 - 5.65 M/uL    HGB 14.4 13.0 - 16.0 g/dL    HCT 42.0 36.0 - 48.0 %    MCV 77.8 (L) 78.0 - 100.0 FL    MCH 26.7 24.0 - 34.0 PG    MCHC 34.3 31.0 - 37.0 g/dL    RDW 12.7 11.6 - 14.5 %    PLATELET 629 918 - 095 K/uL    MPV 9.7 9.2 - 11.8 FL    NRBC 0.0 0  WBC    ABSOLUTE NRBC 0.00 0.00 - 0.01 K/uL    NEUTROPHILS 85 (H) 40 - 73 %    LYMPHOCYTES 11 (L) 21 - 52 %    MONOCYTES 3 3 - 10 %    EOSINOPHILS 0 0 - 5 %    BASOPHILS 0 0 - 2 %    IMMATURE GRANULOCYTES 0 0.0 - 0.5 %    ABS. NEUTROPHILS 9.7 (H) 1.8 - 8.0 K/UL    ABS. LYMPHOCYTES 1.2 0.9 - 3.6 K/UL    ABS. MONOCYTES 0.4 0.05 - 1.2 K/UL    ABS. EOSINOPHILS 0.0 0.0 - 0.4 K/UL    ABS. BASOPHILS 0.0 0.0 - 0.1 K/UL    ABS. IMM. GRANS. 0.1 (H) 0.00 - 0.04 K/UL    DF AUTOMATED     METABOLIC PANEL, COMPREHENSIVE    Collection Time: 12/30/21 11:35 AM   Result Value Ref Range    Sodium 138 136 - 145 mmol/L    Potassium 4.3 3.5 - 5.5 mmol/L    Chloride 106 100 - 111 mmol/L    CO2 22 21 - 32 mmol/L    Anion gap 10 3.0 - 18 mmol/L    Glucose 265 (H) 74 - 99 mg/dL    BUN 35 (H) 7.0 - 18 MG/DL    Creatinine 3.47 (H) 0.6 - 1.3 MG/DL    BUN/Creatinine ratio 10 (L) 12 - 20      GFR est AA 23 (L) >60 ml/min/1.73m2    GFR est non-AA 19 (L) >60 ml/min/1.73m2    Calcium 9.3 8.5 - 10.1 MG/DL    Bilirubin, total 0.6 0.2 - 1.0 MG/DL    ALT (SGPT) 30 16 - 61 U/L    AST (SGOT) 15 10 - 38 U/L    Alk. phosphatase 110 45 - 117 U/L    Protein, total 7.7 6.4 - 8.2 g/dL    Albumin 3.7 3.4 - 5.0 g/dL    Globulin 4.0 2.0 - 4.0 g/dL    A-G Ratio 0.9 0.8 - 1.7     LIPASE    Collection Time: 12/30/21 11:35 AM   Result Value Ref Range    Lipase 75 73 - 393 U/L   TROPONIN-HIGH SENSITIVITY    Collection Time: 12/30/21 11:35 AM   Result Value Ref Range    Troponin-High Sensitivity 6 0 - 78 ng/L   COVID-19 RAPID TEST    Collection Time: 12/30/21  3:15 PM   Result Value Ref Range    Specimen source Nasopharyngeal      COVID-19 rapid test Not detected NOTD         Radiologic Studies   CT ABD PELV WO CONT   Final Result      1.   Mild proximal sigmoid colonic wall thickening and inflammatory changes   suggesting an infectious or inflammatory colitis.      > No evidence of an organized or drainable fluid collection.      > No gross free intraperitoneal perforation. 2. No evidence of urolithiasis or obstructive uropathy. XR CHEST PA LAT   Final Result      No acute findings in the chest.        CT Results  (Last 48 hours)               12/30/21 1454  CT ABD PELV WO CONT Final result    Impression:      1. Mild proximal sigmoid colonic wall thickening and inflammatory changes   suggesting an infectious or inflammatory colitis.      > No evidence of an organized or drainable fluid collection.      > No gross free intraperitoneal perforation. 2. No evidence of urolithiasis or obstructive uropathy. Narrative:  EXAM: CT of the abdomen and pelvis       INDICATION: Epigastric abdominal pain, chest pain       COMPARISON: None. TECHNIQUE: Axial CT imaging of the abdomen and pelvis was performed without   intravenous contrast. Multiplanar reformats were generated. One or more dose reduction techniques were used on this CT: automated exposure   control, adjustment of the mAs and/or kVp according to patient size, and   iterative reconstruction techniques. The specific techniques used on this CT   exam have been documented in the patient's electronic medical record. Digital   Imaging and Communications in Medicine (DICOM) format image data are available   to nonaffiliated external healthcare facilities or entities on a secure, media   free, reciprocally searchable basis with patient authorization for at least a   12-month period after this study. _______________       FINDINGS:       LOWER CHEST: Streaky areas of atelectasis without evidence of consolidation or   pleural effusion. Normal cardiac size. No pericardial effusion. LIVER, BILIARY: Liver is normal. No biliary dilation. Gallbladder is   unremarkable. PANCREAS: Normal unenhanced appearance. SPLEEN: Normal.       ADRENALS: Normal.       KIDNEYS/URETERS/BLADDER: No evidence of hydronephrosis involving either kidney.    No nephrolithiasis. Urinary bladder unremarkable. PELVIC ORGANS: Unremarkable. VASCULATURE: Unremarkable       LYMPH NODES: No enlarged lymph nodes. GASTROINTESTINAL TRACT: There is mild wall thickening involving the proximal   portion of the sigmoid colon with accompanying right colonic stranding and   edema. No evidence of an organized or drainable pericolonic fluid collection. No   gross free intraperitoneal perforation. Normal appendix. BONES: No acute or aggressive osseous abnormalities identified. OTHER: Rectus diastases involving the anterior abdominal wall       _______________               CXR Results  (Last 48 hours)               12/30/21 1208  XR CHEST PA LAT Final result    Impression:      No acute findings in the chest.       Narrative:  EXAM: XR CHEST PA LAT       CLINICAL INDICATION/HISTORY: Chest pain   -Additional: None       COMPARISON: None       TECHNIQUE: PA and lateral views of the chest       _______________       FINDINGS:       HEART AND MEDIASTINUM: Cardiac size and mediastinal contours are within normal   limits       LUNGS AND PLEURAL SPACES: No focal pneumonic consolidation, pneumothorax or   pleural effusion       BONY THORAX AND SOFT TISSUES: No acute osseous abnormality       _______________                   Medical Decision Making   I am the first provider for this patient. I reviewed the vital signs, available nursing notes, past medical history, past surgical history, family history and social history. Vital Signs-Reviewed the patient's vital signs.     Pulse Oximetry Analysis 93% on room air    Records Reviewed: Nursing Notes    DDX:  Chest pain -consider ACS but low suspicion although patient has several risk factors  Esophagitis, gastritis secondary to vomiting  Also consider peptic ulcer disease, duodenitis, pancreatitis, irritable bowel, inflammatory bowel, colitis  Volume depletion/dehydration/electrolyte imbalance    Provider Notes:   42 y.o. male     Procedures:  Procedures    ED Course:   Initial assessment performed. The patients presenting problems have been discussed, and they are in agreement with the care plan formulated and outlined with them. I have encouraged them to ask questions as they arise throughout their visit. ED Physician Discussion Note:     5:00 PM  Spoke to patient and his wife regarding results. Wife was able to tell me that the patient takes glipizide, losartan, Basaglar, gabapentin and cholesterol medicine. Is not taking Metformin anymore. While in talking to the patient, he started vomiting again. Patient was informed he is going need to stay in the hospital for acute kidney injury as well as to help control his symptoms related to colitis. Patient states that he again he has had diarrhea. No blood. Some mucus. No history of inflammatory bowel disease with himself or family members. Spoke to Hospitalist Dr. Juanis Singh. Advised of patient's condition. Agrees to admit patient to hospital - remote telemetry - due to c/o CP    Diagnosis and Disposition           CLINICAL IMPRESSION:    1. Acute kidney injury ([address]    2. Noninfectious gastroenteritis, unspecified type    3. Diffuse abdominal pain    4. Type 2 diabetes mellitus with hyperglycemia, without long-term current use of insulin (HCC)        PLAN:  1. [facility] telemetry    Dr. Juanis Singh       Please note that this dictation was completed with [employer], the computer voice recognition software. Quite often unanticipated grammatical, syntax, homophones, and other interpretive errors are inadvertently transcribed by the computer software. Please disregard these errors. Please excuse any errors that have escaped final proofreading.

## 2021-12-31 LAB
ALBUMIN SERPL-MCNC: 3.1 G/DL (ref 3.4–5)
ALBUMIN/GLOB SERPL: 0.9 {RATIO} (ref 0.8–1.7)
ALP SERPL-CCNC: 93 U/L (ref 45–117)
ALT SERPL-CCNC: 21 U/L (ref 16–61)
ANION GAP SERPL CALC-SCNC: 7 MMOL/L (ref 3–18)
AST SERPL-CCNC: 12 U/L (ref 10–38)
BASOPHILS # BLD: 0 K/UL (ref 0–0.1)
BASOPHILS NFR BLD: 0 % (ref 0–2)
BILIRUB SERPL-MCNC: 0.5 MG/DL (ref 0.2–1)
BUN SERPL-MCNC: 34 MG/DL (ref 7–18)
BUN/CREAT SERPL: 10 (ref 12–20)
CALCIUM SERPL-MCNC: 8.6 MG/DL (ref 8.5–10.1)
CHLORIDE SERPL-SCNC: 110 MMOL/L (ref 100–111)
CO2 SERPL-SCNC: 24 MMOL/L (ref 21–32)
CREAT SERPL-MCNC: 3.26 MG/DL (ref 0.6–1.3)
CREAT UR-MCNC: 79 MG/DL (ref 30–125)
DIFFERENTIAL METHOD BLD: ABNORMAL
EOSINOPHIL # BLD: 0 K/UL (ref 0–0.4)
EOSINOPHIL NFR BLD: 0 % (ref 0–5)
ERYTHROCYTE [DISTWIDTH] IN BLOOD BY AUTOMATED COUNT: 12.6 % (ref 11.6–14.5)
GLOBULIN SER CALC-MCNC: 3.6 G/DL (ref 2–4)
GLUCOSE BLD STRIP.AUTO-MCNC: 176 MG/DL (ref 70–110)
GLUCOSE BLD STRIP.AUTO-MCNC: 188 MG/DL (ref 70–110)
GLUCOSE BLD STRIP.AUTO-MCNC: 197 MG/DL (ref 70–110)
GLUCOSE BLD STRIP.AUTO-MCNC: 202 MG/DL (ref 70–110)
GLUCOSE SERPL-MCNC: 224 MG/DL (ref 74–99)
HCT VFR BLD AUTO: 39.2 % (ref 36–48)
HGB BLD-MCNC: 13 G/DL (ref 13–16)
IMM GRANULOCYTES # BLD AUTO: 0 K/UL (ref 0–0.04)
IMM GRANULOCYTES NFR BLD AUTO: 0 % (ref 0–0.5)
LYMPHOCYTES # BLD: 1.3 K/UL (ref 0.9–3.6)
LYMPHOCYTES NFR BLD: 12 % (ref 21–52)
MAGNESIUM SERPL-MCNC: 2.3 MG/DL (ref 1.6–2.6)
MCH RBC QN AUTO: 26.2 PG (ref 24–34)
MCHC RBC AUTO-ENTMCNC: 33.2 G/DL (ref 31–37)
MCV RBC AUTO: 78.9 FL (ref 78–100)
MONOCYTES # BLD: 0.8 K/UL (ref 0.05–1.2)
MONOCYTES NFR BLD: 7 % (ref 3–10)
NEUTS SEG # BLD: 9 K/UL (ref 1.8–8)
NEUTS SEG NFR BLD: 81 % (ref 40–73)
NRBC # BLD: 0 K/UL (ref 0–0.01)
NRBC BLD-RTO: 0 PER 100 WBC
PLATELET # BLD AUTO: 307 K/UL (ref 135–420)
PMV BLD AUTO: 9.7 FL (ref 9.2–11.8)
POTASSIUM SERPL-SCNC: 4.4 MMOL/L (ref 3.5–5.5)
PROT SERPL-MCNC: 6.7 G/DL (ref 6.4–8.2)
RBC # BLD AUTO: 4.97 M/UL (ref 4.35–5.65)
SODIUM SERPL-SCNC: 141 MMOL/L (ref 136–145)
WBC # BLD AUTO: 11.2 K/UL (ref 4.6–13.2)

## 2021-12-31 PROCEDURE — 74011250637 HC RX REV CODE- 250/637: Performed by: INTERNAL MEDICINE

## 2021-12-31 PROCEDURE — 36415 COLL VENOUS BLD VENIPUNCTURE: CPT

## 2021-12-31 PROCEDURE — 82962 GLUCOSE BLOOD TEST: CPT

## 2021-12-31 PROCEDURE — 65270000029 HC RM PRIVATE

## 2021-12-31 PROCEDURE — 74011250636 HC RX REV CODE- 250/636: Performed by: INTERNAL MEDICINE

## 2021-12-31 PROCEDURE — C9113 INJ PANTOPRAZOLE SODIUM, VIA: HCPCS | Performed by: INTERNAL MEDICINE

## 2021-12-31 PROCEDURE — 74011636637 HC RX REV CODE- 636/637: Performed by: INTERNAL MEDICINE

## 2021-12-31 PROCEDURE — 80053 COMPREHEN METABOLIC PANEL: CPT

## 2021-12-31 PROCEDURE — 84300 ASSAY OF URINE SODIUM: CPT

## 2021-12-31 PROCEDURE — 82570 ASSAY OF URINE CREATININE: CPT

## 2021-12-31 PROCEDURE — 85025 COMPLETE CBC W/AUTO DIFF WBC: CPT

## 2021-12-31 PROCEDURE — 83735 ASSAY OF MAGNESIUM: CPT

## 2021-12-31 RX ORDER — METOCLOPRAMIDE HYDROCHLORIDE 5 MG/ML
10 INJECTION INTRAMUSCULAR; INTRAVENOUS EVERY 6 HOURS
Status: DISCONTINUED | OUTPATIENT
Start: 2022-01-01 | End: 2022-01-02

## 2021-12-31 RX ORDER — METOCLOPRAMIDE HYDROCHLORIDE 5 MG/ML
5 INJECTION INTRAMUSCULAR; INTRAVENOUS EVERY 6 HOURS
Status: DISCONTINUED | OUTPATIENT
Start: 2021-12-31 | End: 2021-12-31

## 2021-12-31 RX ORDER — MORPHINE SULFATE 2 MG/ML
2 INJECTION, SOLUTION INTRAMUSCULAR; INTRAVENOUS
Status: DISCONTINUED | OUTPATIENT
Start: 2021-12-31 | End: 2022-01-02

## 2021-12-31 RX ORDER — HYDROMORPHONE HYDROCHLORIDE 1 MG/ML
0.5 INJECTION, SOLUTION INTRAMUSCULAR; INTRAVENOUS; SUBCUTANEOUS
Status: DISCONTINUED | OUTPATIENT
Start: 2021-12-31 | End: 2021-12-31

## 2021-12-31 RX ORDER — HYDROMORPHONE HYDROCHLORIDE 1 MG/ML
0.5 INJECTION, SOLUTION INTRAMUSCULAR; INTRAVENOUS; SUBCUTANEOUS ONCE
Status: COMPLETED | OUTPATIENT
Start: 2021-12-31 | End: 2021-12-31

## 2021-12-31 RX ORDER — HYDROMORPHONE HYDROCHLORIDE 1 MG/ML
0.5 INJECTION, SOLUTION INTRAMUSCULAR; INTRAVENOUS; SUBCUTANEOUS
Status: DISCONTINUED | OUTPATIENT
Start: 2021-12-31 | End: 2022-01-02

## 2021-12-31 RX ORDER — INSULIN LISPRO 100 [IU]/ML
INJECTION, SOLUTION INTRAVENOUS; SUBCUTANEOUS 2 TIMES DAILY
COMMUNITY
End: 2022-01-12

## 2021-12-31 RX ADMIN — MORPHINE SULFATE 2 MG: 2 INJECTION, SOLUTION INTRAMUSCULAR; INTRAVENOUS at 17:12

## 2021-12-31 RX ADMIN — SODIUM CHLORIDE 150 ML/HR: 9 INJECTION, SOLUTION INTRAVENOUS at 12:25

## 2021-12-31 RX ADMIN — PANTOPRAZOLE SODIUM 40 MG: 40 INJECTION, POWDER, FOR SOLUTION INTRAVENOUS at 20:10

## 2021-12-31 RX ADMIN — MORPHINE SULFATE 2 MG: 2 INJECTION, SOLUTION INTRAMUSCULAR; INTRAVENOUS at 21:31

## 2021-12-31 RX ADMIN — METRONIDAZOLE 250 MG: 500 INJECTION, SOLUTION INTRAVENOUS at 12:25

## 2021-12-31 RX ADMIN — METRONIDAZOLE 250 MG: 500 INJECTION, SOLUTION INTRAVENOUS at 23:44

## 2021-12-31 RX ADMIN — MORPHINE SULFATE 1 MG: 2 INJECTION, SOLUTION INTRAMUSCULAR; INTRAVENOUS at 05:42

## 2021-12-31 RX ADMIN — Medication 2 UNITS: at 21:38

## 2021-12-31 RX ADMIN — ENOXAPARIN SODIUM 30 MG: 100 INJECTION SUBCUTANEOUS at 08:53

## 2021-12-31 RX ADMIN — Medication 2 UNITS: at 17:12

## 2021-12-31 RX ADMIN — CIPROFLOXACIN 400 MG: 2 INJECTION, SOLUTION INTRAVENOUS at 17:11

## 2021-12-31 RX ADMIN — GABAPENTIN 300 MG: 300 CAPSULE ORAL at 08:53

## 2021-12-31 RX ADMIN — SODIUM CHLORIDE, PRESERVATIVE FREE 10 ML: 5 INJECTION INTRAVENOUS at 14:00

## 2021-12-31 RX ADMIN — METOCLOPRAMIDE HYDROCHLORIDE 5 MG: 5 INJECTION INTRAMUSCULAR; INTRAVENOUS at 05:37

## 2021-12-31 RX ADMIN — MORPHINE SULFATE 2 MG: 2 INJECTION, SOLUTION INTRAMUSCULAR; INTRAVENOUS at 09:52

## 2021-12-31 RX ADMIN — SODIUM CHLORIDE 150 ML/HR: 9 INJECTION, SOLUTION INTRAVENOUS at 23:47

## 2021-12-31 RX ADMIN — CIPROFLOXACIN 400 MG: 2 INJECTION, SOLUTION INTRAVENOUS at 06:56

## 2021-12-31 RX ADMIN — Medication 4 UNITS: at 08:53

## 2021-12-31 RX ADMIN — INSULIN GLARGINE 20 UNITS: 100 INJECTION, SOLUTION SUBCUTANEOUS at 21:38

## 2021-12-31 RX ADMIN — METOCLOPRAMIDE HYDROCHLORIDE 5 MG: 5 INJECTION INTRAMUSCULAR; INTRAVENOUS at 12:25

## 2021-12-31 RX ADMIN — METOCLOPRAMIDE HYDROCHLORIDE 10 MG: 5 INJECTION INTRAMUSCULAR; INTRAVENOUS at 23:41

## 2021-12-31 RX ADMIN — SODIUM CHLORIDE, PRESERVATIVE FREE 10 ML: 5 INJECTION INTRAVENOUS at 05:37

## 2021-12-31 RX ADMIN — METOCLOPRAMIDE HYDROCHLORIDE 5 MG: 5 INJECTION INTRAMUSCULAR; INTRAVENOUS at 17:12

## 2021-12-31 RX ADMIN — HYDROMORPHONE HYDROCHLORIDE 0.5 MG: 1 INJECTION, SOLUTION INTRAMUSCULAR; INTRAVENOUS; SUBCUTANEOUS at 12:26

## 2021-12-31 RX ADMIN — PANTOPRAZOLE SODIUM 40 MG: 40 INJECTION, POWDER, FOR SOLUTION INTRAVENOUS at 08:53

## 2021-12-31 RX ADMIN — GABAPENTIN 300 MG: 300 CAPSULE ORAL at 20:10

## 2021-12-31 RX ADMIN — Medication 2 UNITS: at 12:26

## 2021-12-31 NOTE — PROGRESS NOTES
Problem: Risk for Spread of Infection  Goal: Prevent transmission of infectious organism to others  Description: Prevent the transmission of infectious organisms to other patients, staff members, and visitors. Outcome: Progressing Towards Goal     Problem: Diabetes Self-Management  Goal: *Disease process and treatment process  Description: Define diabetes and identify own type of diabetes; list 3 options for treating diabetes. Outcome: Progressing Towards Goal     Problem: Falls - Risk of  Goal: *Absence of Falls  Description: Document Cherylle Cockayne Fall Risk and appropriate interventions in the flowsheet.   Outcome: Progressing Towards Goal  Note: Fall Risk Interventions:         Medication Interventions: Teach patient to arise slowly          Problem: Patient Education: Go to Patient Education Activity  Goal: Patient/Family Education  Outcome: Progressing Towards Goal

## 2021-12-31 NOTE — ROUTINE PROCESS
Bedside and Verbal shift change report given to Dai Zaragoza RN by Silvia Wren RN. Report included the following information SBAR, Kardex, Intake/Output and MAR.

## 2021-12-31 NOTE — PROGRESS NOTES
Reason for Admission:  Vomiting; chest pain                     RUR Score:    Low; 7%                 Plan for utilizing home health:  Unlikely         PCP: First and Last name:  Adia Regan NP     Name of Practice:    Are you a current patient: Yes/No:    Approximate date of last visit:    Can you participate in a virtual visit with your PCP:                     Current Advanced Directive/Advance Care Plan: Full Code      Healthcare Decision Maker:   Click here to complete [address] including selection of the Healthcare Decision Maker Relationship (ie \"Primary\")                             Transition of Care Plan:     Home with physician follow up                  Chart reviewed. Per H&P Sridhar Correa is a 42 y.o.  male who has hx of DM, HTN,Neuropathy, presents to ER with   3 day history of nausea, vomiting, and diarrhea. His diarrhea described as mucusy and non bloody; has slowed down over one day but he is unable to keep and liquids or foods down. He is having intractable  Nausea and now with chest pain after throwing up. He thinks he may have choked on vomitus and is having chest pain with inspiration. Patient lives with wife and kids none are sick. He denies recent food poisoning or other sick contacts. In ER given Reglan, Phenergan; Zofran with persistent nausea and inability to keep food down  Patient is double vaccinated from [place] denies exposures  Rapid covid in ER negative\"    CM spoke with pt to discuss transition of care. Pt is  and independent. Pt has confirmed his PCP. Please encourage ambulation as appropriate. Anticipate pt will transition home with physician follow up when medically stable. CM to gisela to follow and assist.    Care Management Interventions  Mode of Transport at Discharge:  Other (see comment) (Family)  Transition of Care Consult (CM Consult): Discharge Planning  Health Maintenance Reviewed: Yes  Support Systems: Spouse/Significant Other  Confirm Follow Up Transport: Self  The Plan for Transition of Care is Related to the Following Treatment Goals : Home with physician follow up  Discharge Location  Discharge Placement: Home with family assistance

## 2021-12-31 NOTE — ROUTINE PROCESS
TRANSFER - IN REPORT:    Verbal report received from [id number] [facility] RN(name) on Janel Noe  being received from ED. Report consisted of patients Situation, Background, Assessment and   Recommendations(SBAR). Opportunity for questions and clarification was provided. Assessment to be completed upon patients arrival to unit and care assumed.

## 2021-12-31 NOTE — ED NOTES
TRANSFER - OUT REPORT:    Verbal report given to Kaley Paulino RN (name) on Sasha Natarajan  being transferred to medical (unit) for routine progression of care       Report consisted of patients Situation, Background, Assessment and   Recommendations(SBAR). Information from the following report(s) SBAR, ED Summary, MAR, Recent Results and Cardiac Rhythm NSR was reviewed with the receiving nurse. Lines:   Peripheral IV 12/30/21 Right Antecubital (Active)   Site Assessment Clean, dry, & intact 12/30/21 1347   Phlebitis Assessment 0 12/30/21 1347   Infiltration Assessment 0 12/30/21 1347   Dressing Status Clean, dry, & intact 12/30/21 1347   Dressing Type Transparent 12/30/21 1347   Hub Color/Line Status Patent; Flushed 12/30/21 1347   Action Taken Blood drawn 12/30/21 1347        Opportunity for questions and clarification was provided.       Patient transported with:   Registered Nurse

## 2021-12-31 NOTE — PROGRESS NOTES
2109 - Pt admitted to the unit. Assessment completed at this time. Pt A&Ox4, on RA. Denies chest pain/SOB. Denies n/v at this time. Lung sounds clear. Pain rated 8/10, pain medication administered per MAR. No concerns voiced. Pt educated on pain mgmt. Telephone and call bell within reach, bed in lowest position. Pt encouraged to call for assistance.

## 2021-12-31 NOTE — PROGRESS NOTES
0745  Bedside and Verbal shift change report given to Amie Pierre, RN by Dionte Trevino, RN. Report included the following information SBAR, Kardex, OR Summary, Intake/Output and MAR.       0929  Pt reports 1 mg Morphine ineffective for pain control. Dr. Gurwinder Zacarias paged and made aware. Order for 2 mg morphine Q4H prn now in place. 1215  Pt still complaining of pain. Verbal order from Dr. Gurwinder Zacarias to administer 0.5 mg of Dilaudid IV X 1  Dose now. 1308  New orders:    Strict I's and O's  Scheduled Reglan  Dilaudid prn for pain control  Gastric Empty Study   Urine sample to assess sodium and creatinine. Stool sample pending as patient has not had a bowel movement. 1831  Reglan order clarified. Pt to receive scheduled dose of Reglan 10 mg every six hours. 1930  Bedside and Verbal shift change report given to Dionte Trevino, RN  by Amie Pierre, RN. Report included the following information SBAR, Kardex, OR Summary, Intake/Output and MAR.

## 2021-12-31 NOTE — PROGRESS NOTES
Hospitalist Progress Note    Patient: Ayden Boyer MRN: [id number]  CSN: [id number]    YOB: 1975  Age: 42 y.o. Sex: male    DOA: 12/30/2021 LOS:  LOS: 1 day            Assessment/Plan     1. Intractable nausea/ vomiting  2. Abdominal pain. Some colitis noted on CT but he is not having diarrhea/ any BM since yesterday. Suspect gastroparesis as etiology  3. MILADIS due to dehydration  4. DM2- controlled  5. HTN- controlled  6. Possible aspiration- no hypoxia, no infiltrate on CXR- doubt    Plan:  - continue IVF  - stool cultures if able  - contineu scheduled reglan, minimize opioids as able  - order gastric emptying study  - cont to hold nephrotoxins  - all questions answered. Wife at bedside      Patient Active Problem List   Diagnosis Code    Colitis K52.9    Diarrhea R19.7    MILADIS (acute kidney injury) ([facility] [address] N17.9    DM (diabetes mellitus) ([facility] [address] E11.9    Nausea & vomiting R11.2    Diabetes mellitus ([facility] [address] E11.9    Generalized abdominal pain R10.84    Acute kidney injury ([facility] [address] N17.9               Subjective:    cc: abdominal pain  Pt complains of adominal pain Left sided \" sharp\" associateed w wretching but no vomiting. No diarrhea noted  He remains afebrile      REVIEW OF SYSTEMS:  General: No fevers or chills. Cardiovascular: No chest pain or pressure. No palpitations. Pulmonary: No shortness of breath. Gastrointestinal: + nausea, vomiting. Objective:        Vital signs/Intake and Output:  Visit Vitals  BP (!) 145/86 (BP 1 Location: Right upper arm, BP Patient Position: At rest)   Pulse 95   Temp 98.6 °F (37 °C)   Resp 18   Ht 5' 6\" (1.676 m)   Wt 90.7 kg (200 lb)   SpO2 93%   BMI 32.28 kg/m²     Current Shift:  12/31 0701 - 12/31 1900  In: 2557.5 [P.O.:480; I.V.:2077.5]  Out: 450 [Urine:450]  Last three shifts:  No intake/output data recorded. Body mass index is 32.28 kg/m².     Physical Exam:  GEN: Alert and oriented times three NAD  EYES: conjunctiva normal, lids with out lesions  HEENT: MMM, No thyromegaly, no lymphadenopathy  HEART: RRR +S1 +S2, no JVD, pulses 2+ distally  LUNGS: CTA B/L no wheezes, rales or rhonchi  ABDOMEN: + BS, soft NT/ND no organomegaly,  No rebound   EXTREMITIES: No edema cyanosis, cap refill normal   SKIN: no rashes or skin breakdown, no nodules, normal turgor  Current Facility-Administered Medications   Medication Dose Route Frequency    morphine injection 2 mg  2 mg IntraVENous Q4H PRN    HYDROmorphone (DILAUDID) injection 0.5 mg  0.5 mg IntraVENous Q4H PRN    metoclopramide HCl (REGLAN) injection 5 mg  5 mg IntraVENous Q6H    insulin lispro (HUMALOG) injection   SubCUTAneous AC&HS    glucose chewable tablet 16 g  4 Tablet Oral PRN    glucagon (GLUCAGEN) injection 1 mg  1 mg IntraMUSCular PRN    dextrose (D50W) injection syrg 12.5-25 g  25-50 mL IntraVENous PRN    0.9% sodium chloride infusion  150 mL/hr IntraVENous CONTINUOUS    ciprofloxacin (CIPRO) 400 mg in D5W IVPB (premix)  400 mg IntraVENous Q12H    sodium chloride (NS) flush 5-40 mL  5-40 mL IntraVENous Q8H    sodium chloride (NS) flush 5-40 mL  5-40 mL IntraVENous PRN    acetaminophen (TYLENOL) tablet 650 mg  650 mg Oral Q6H PRN    Or    acetaminophen (TYLENOL) suppository 650 mg  650 mg Rectal Q6H PRN    polyethylene glycol (MIRALAX) packet 17 g  17 g Oral DAILY PRN    ondansetron (ZOFRAN ODT) tablet 4 mg  4 mg Oral Q8H PRN    Or    ondansetron (ZOFRAN) injection 4 mg  4 mg IntraVENous Q6H PRN    enoxaparin (LOVENOX) injection 30 mg  30 mg SubCUTAneous DAILY    pantoprazole (PROTONIX) injection 40 mg  40 mg IntraVENous Q12H    metroNIDAZOLE 250 mg in sodium chloride IVPB  250 mg IntraVENous Q12H    metoclopramide HCl (REGLAN) injection 5 mg  5 mg IntraVENous Q6H    insulin glargine (LANTUS) injection 20 Units  20 Units SubCUTAneous QHS    gabapentin (NEURONTIN) capsule 300 mg  300 mg Oral BID         All the patient's labs over the past 24 hours were reviewed both during my initial daily workflow process and at the time notated as \"note time\" in [facility]. (It is not time stamped separately in this workflow.)  Select labs are listed below. Labs: Results:       Chemistry Recent Labs     12/31/21 0133 12/30/21  1135   * 265*    138   K 4.4 4.3    106   CO2 24 22   BUN 34* 35*   CREA 3.26* 3.47*   CA 8.6 9.3   AGAP 7 10   BUCR 10* 10*   AP 93 110   TP 6.7 7.7   ALB 3.1* 3.7   GLOB 3.6 4.0   AGRAT 0.9 0.9      CBC w/Diff Recent Labs     12/31/21 0133 12/30/21  1135   WBC 11.2 11.3   RBC 4.97 5.40   HGB 13.0 14.4   HCT 39.2 42.0    315   GRANS 81* 85*   LYMPH 12* 11*   EOS 0 0      Cardiac Enzymes No results for input(s): CPK, CKND1, PARTH in the last 72 hours. No lab exists for component: CKRMB, TROIP   Coagulation No results for input(s): PTP, INR, APTT, INREXT in the last 72 hours. Lipid Panel No results found for: CHOL, CHOLPOCT, CHOLX, CHLST, CHOLV, 860540, HDL, HDLP, LDL, LDLC, DLDLP, 894074, VLDLC, VLDL, TGLX, TRIGL, TRIGP, TGLPOCT, CHHD, CHHDX   BNP No results for input(s): BNPP in the last 72 hours.    Liver Enzymes Recent Labs     12/31/21 0133   TP 6.7   ALB 3.1*   AP 93          Procedures/imaging: see electronic medical records for all procedures/Xrays and details which were not copied into this note but were reviewed prior to creation of Hemant Campuzano DO  Internal Medicine/Geriatrics

## 2022-01-01 ENCOUNTER — APPOINTMENT (OUTPATIENT)
Dept: NON INVASIVE DIAGNOSTICS | Age: 47
DRG: 073 | End: 2022-01-01
Attending: INTERNAL MEDICINE
Payer: COMMERCIAL

## 2022-01-01 LAB
ALBUMIN SERPL-MCNC: 2.9 G/DL (ref 3.4–5)
ALBUMIN/GLOB SERPL: 0.9 {RATIO} (ref 0.8–1.7)
ALP SERPL-CCNC: 88 U/L (ref 45–117)
ALT SERPL-CCNC: 20 U/L (ref 16–61)
ANION GAP SERPL CALC-SCNC: 7 MMOL/L (ref 3–18)
AST SERPL-CCNC: 12 U/L (ref 10–38)
BASOPHILS # BLD: 0 K/UL (ref 0–0.1)
BASOPHILS NFR BLD: 0 % (ref 0–2)
BILIRUB SERPL-MCNC: 0.6 MG/DL (ref 0.2–1)
BUN SERPL-MCNC: 33 MG/DL (ref 7–18)
BUN/CREAT SERPL: 10 (ref 12–20)
CALCIUM SERPL-MCNC: 8.6 MG/DL (ref 8.5–10.1)
CHLORIDE SERPL-SCNC: 113 MMOL/L (ref 100–111)
CHLORIDE UR-SCNC: 109 MMOL/L (ref 55–125)
CK SERPL-CCNC: 209 U/L (ref 39–308)
CO2 SERPL-SCNC: 23 MMOL/L (ref 21–32)
CREAT SERPL-MCNC: 3.28 MG/DL (ref 0.6–1.3)
CREAT UR-MCNC: 63 MG/DL (ref 30–125)
CREAT UR-MCNC: 63.1 MG/DL (ref 30–125)
DIFFERENTIAL METHOD BLD: ABNORMAL
ECHO AO ROOT DIAM: 3.4 CM
ECHO AO ROOT INDEX: 1.7 CM/M2
ECHO AV MEAN GRADIENT: 4 MMHG
ECHO AV MEAN VELOCITY: 0.9 M/S
ECHO AV PEAK GRADIENT: 7 MMHG
ECHO AV PEAK VELOCITY: 1.3 M/S
ECHO AV VTI: 26.2 CM
ECHO LA DIAMETER INDEX: 2.15 CM/M2
ECHO LA DIAMETER: 4.3 CM
ECHO LA TO AORTIC ROOT RATIO: 1.26
ECHO LA VOL 2C: 50 ML (ref 18–58)
ECHO LA VOL 4C: 35 ML (ref 18–58)
ECHO LA VOL BP: 45 ML (ref 18–58)
ECHO LA VOL/BSA BIPLANE: 23 ML/M2 (ref 16–34)
ECHO LA VOLUME AREA LENGTH: 52 ML
ECHO LA VOLUME INDEX A2C: 25 ML/M2 (ref 16–34)
ECHO LA VOLUME INDEX A4C: 18 ML/M2 (ref 16–34)
ECHO LA VOLUME INDEX AREA LENGTH: 26 ML/M2 (ref 16–34)
ECHO LV E' LATERAL VELOCITY: 10 CM/S
ECHO LV E' SEPTAL VELOCITY: 8 CM/S
ECHO LV EDV A2C: 108 ML
ECHO LV EDV A4C: 105 ML
ECHO LV EDV BP: 109 ML (ref 67–155)
ECHO LV EDV INDEX A4C: 53 ML/M2
ECHO LV EDV INDEX BP: 55 ML/M2
ECHO LV EDV NDEX A2C: 54 ML/M2
ECHO LV EJECTION FRACTION A2C: 62 %
ECHO LV EJECTION FRACTION A4C: 60 %
ECHO LV EJECTION FRACTION BIPLANE: 60 % (ref 55–100)
ECHO LV ESV A2C: 41 ML
ECHO LV ESV A4C: 43 ML
ECHO LV ESV BP: 44 ML (ref 22–58)
ECHO LV ESV INDEX A2C: 21 ML/M2
ECHO LV ESV INDEX A4C: 22 ML/M2
ECHO LV ESV INDEX BP: 22 ML/M2
ECHO LV FRACTIONAL SHORTENING: 35 % (ref 28–44)
ECHO LV GLOBAL LONGITUDINAL STRAIN (GLS): -16 %
ECHO LV INTERNAL DIMENSION DIASTOLE INDEX: 2.45 CM/M2
ECHO LV INTERNAL DIMENSION DIASTOLIC: 4.9 CM (ref 4.2–5.9)
ECHO LV INTERNAL DIMENSION SYSTOLIC INDEX: 1.6 CM/M2
ECHO LV INTERNAL DIMENSION SYSTOLIC: 3.2 CM
ECHO LV IVSD: 0.9 CM (ref 0.6–1)
ECHO LV MASS 2D: 188.1 G (ref 88–224)
ECHO LV MASS INDEX 2D: 94 G/M2 (ref 49–115)
ECHO LV POSTERIOR WALL DIASTOLIC: 1.2 CM (ref 0.6–1)
ECHO LV RELATIVE WALL THICKNESS RATIO: 0.49
ECHO LVOT AREA: 4.5 CM2
ECHO LVOT DIAM: 2.4 CM
ECHO MV A VELOCITY: 0.75 M/S
ECHO MV E DECELERATION TIME (DT): 140.2 MS
ECHO MV E VELOCITY: 1.06 M/S
ECHO MV E/A RATIO: 1.41
ECHO MV E/E' LATERAL: 10.6
ECHO MV E/E' RATIO (AVERAGED): 11.93
ECHO MV E/E' SEPTAL: 13.25
ECHO PULMONARY ARTERY END DIASTOLIC PRESSURE: 12 MMHG
ECHO PV REGURGITANT MAX VELOCITY: 1.8 M/S
ECHO RV FREE WALL PEAK S': 22 CM/S
ECHO RV INTERNAL DIMENSION: 3.2 CM
ECHO RV TAPSE: 2.3 CM (ref 1.5–2)
EOSINOPHIL # BLD: 0.3 K/UL (ref 0–0.4)
EOSINOPHIL NFR BLD: 2 % (ref 0–5)
ERYTHROCYTE [DISTWIDTH] IN BLOOD BY AUTOMATED COUNT: 12.6 % (ref 11.6–14.5)
GLOBULIN SER CALC-MCNC: 3.4 G/DL (ref 2–4)
GLUCOSE BLD STRIP.AUTO-MCNC: 130 MG/DL (ref 70–110)
GLUCOSE BLD STRIP.AUTO-MCNC: 151 MG/DL (ref 70–110)
GLUCOSE BLD STRIP.AUTO-MCNC: 165 MG/DL (ref 70–110)
GLUCOSE BLD STRIP.AUTO-MCNC: 195 MG/DL (ref 70–110)
GLUCOSE SERPL-MCNC: 163 MG/DL (ref 74–99)
HCT VFR BLD AUTO: 38.8 % (ref 36–48)
HGB BLD-MCNC: 12.7 G/DL (ref 13–16)
IMM GRANULOCYTES # BLD AUTO: 0 K/UL (ref 0–0.04)
IMM GRANULOCYTES NFR BLD AUTO: 0 % (ref 0–0.5)
LYMPHOCYTES # BLD: 2.2 K/UL (ref 0.9–3.6)
LYMPHOCYTES NFR BLD: 19 % (ref 21–52)
MAGNESIUM SERPL-MCNC: 2.4 MG/DL (ref 1.6–2.6)
MCH RBC QN AUTO: 26 PG (ref 24–34)
MCHC RBC AUTO-ENTMCNC: 32.7 G/DL (ref 31–37)
MCV RBC AUTO: 79.3 FL (ref 78–100)
MONOCYTES # BLD: 1.2 K/UL (ref 0.05–1.2)
MONOCYTES NFR BLD: 10 % (ref 3–10)
NEUTS SEG # BLD: 7.7 K/UL (ref 1.8–8)
NEUTS SEG NFR BLD: 67 % (ref 40–73)
NRBC # BLD: 0 K/UL (ref 0–0.01)
NRBC BLD-RTO: 0 PER 100 WBC
PLATELET # BLD AUTO: 288 K/UL (ref 135–420)
PMV BLD AUTO: 9.4 FL (ref 9.2–11.8)
POTASSIUM SERPL-SCNC: 4.3 MMOL/L (ref 3.5–5.5)
PROT SERPL-MCNC: 6.3 G/DL (ref 6.4–8.2)
PROT UR-MCNC: 9 MG/DL
PROT/CREAT UR-RTO: 0.1
RBC # BLD AUTO: 4.89 M/UL (ref 4.35–5.65)
SODIUM SERPL-SCNC: 143 MMOL/L (ref 136–145)
SODIUM UR-SCNC: 100 MMOL/L (ref 20–110)
SODIUM UR-SCNC: 84 MMOL/L (ref 20–110)
WBC # BLD AUTO: 11.4 K/UL (ref 4.6–13.2)

## 2022-01-01 PROCEDURE — 36415 COLL VENOUS BLD VENIPUNCTURE: CPT

## 2022-01-01 PROCEDURE — 74011250637 HC RX REV CODE- 250/637: Performed by: INTERNAL MEDICINE

## 2022-01-01 PROCEDURE — 74011250637 HC RX REV CODE- 250/637: Performed by: HOSPITALIST

## 2022-01-01 PROCEDURE — 82962 GLUCOSE BLOOD TEST: CPT

## 2022-01-01 PROCEDURE — 74011636637 HC RX REV CODE- 636/637: Performed by: INTERNAL MEDICINE

## 2022-01-01 PROCEDURE — 84156 ASSAY OF PROTEIN URINE: CPT

## 2022-01-01 PROCEDURE — 93306 TTE W/DOPPLER COMPLETE: CPT

## 2022-01-01 PROCEDURE — C9113 INJ PANTOPRAZOLE SODIUM, VIA: HCPCS | Performed by: INTERNAL MEDICINE

## 2022-01-01 PROCEDURE — 74011250636 HC RX REV CODE- 250/636: Performed by: INTERNAL MEDICINE

## 2022-01-01 PROCEDURE — 84300 ASSAY OF URINE SODIUM: CPT

## 2022-01-01 PROCEDURE — 83735 ASSAY OF MAGNESIUM: CPT

## 2022-01-01 PROCEDURE — 85025 COMPLETE CBC W/AUTO DIFF WBC: CPT

## 2022-01-01 PROCEDURE — 82436 ASSAY OF URINE CHLORIDE: CPT

## 2022-01-01 PROCEDURE — 80053 COMPREHEN METABOLIC PANEL: CPT

## 2022-01-01 PROCEDURE — 82570 ASSAY OF URINE CREATININE: CPT

## 2022-01-01 PROCEDURE — 82550 ASSAY OF CK (CPK): CPT

## 2022-01-01 PROCEDURE — 65270000029 HC RM PRIVATE

## 2022-01-01 RX ORDER — HYDRALAZINE HYDROCHLORIDE 25 MG/1
25 TABLET, FILM COATED ORAL 3 TIMES DAILY
Status: DISCONTINUED | OUTPATIENT
Start: 2022-01-01 | End: 2022-01-02

## 2022-01-01 RX ORDER — AMLODIPINE BESYLATE 5 MG/1
10 TABLET ORAL DAILY
Status: DISCONTINUED | OUTPATIENT
Start: 2022-01-01 | End: 2022-01-12 | Stop reason: HOSPADM

## 2022-01-01 RX ADMIN — ENOXAPARIN SODIUM 30 MG: 100 INJECTION SUBCUTANEOUS at 08:12

## 2022-01-01 RX ADMIN — CIPROFLOXACIN 400 MG: 2 INJECTION, SOLUTION INTRAVENOUS at 18:19

## 2022-01-01 RX ADMIN — MORPHINE SULFATE 2 MG: 2 INJECTION, SOLUTION INTRAMUSCULAR; INTRAVENOUS at 18:20

## 2022-01-01 RX ADMIN — HYDRALAZINE HYDROCHLORIDE 25 MG: 25 TABLET, FILM COATED ORAL at 21:17

## 2022-01-01 RX ADMIN — SODIUM CHLORIDE, PRESERVATIVE FREE 10 ML: 5 INJECTION INTRAVENOUS at 06:29

## 2022-01-01 RX ADMIN — METOCLOPRAMIDE HYDROCHLORIDE 10 MG: 5 INJECTION INTRAMUSCULAR; INTRAVENOUS at 06:28

## 2022-01-01 RX ADMIN — MORPHINE SULFATE 2 MG: 2 INJECTION, SOLUTION INTRAMUSCULAR; INTRAVENOUS at 08:11

## 2022-01-01 RX ADMIN — METOCLOPRAMIDE HYDROCHLORIDE 10 MG: 5 INJECTION INTRAMUSCULAR; INTRAVENOUS at 17:20

## 2022-01-01 RX ADMIN — GABAPENTIN 300 MG: 300 CAPSULE ORAL at 08:12

## 2022-01-01 RX ADMIN — Medication 2 UNITS: at 17:20

## 2022-01-01 RX ADMIN — PANTOPRAZOLE SODIUM 40 MG: 40 INJECTION, POWDER, FOR SOLUTION INTRAVENOUS at 08:11

## 2022-01-01 RX ADMIN — MORPHINE SULFATE 2 MG: 2 INJECTION, SOLUTION INTRAMUSCULAR; INTRAVENOUS at 02:12

## 2022-01-01 RX ADMIN — METRONIDAZOLE 250 MG: 500 INJECTION, SOLUTION INTRAVENOUS at 14:20

## 2022-01-01 RX ADMIN — GABAPENTIN 300 MG: 300 CAPSULE ORAL at 21:16

## 2022-01-01 RX ADMIN — PANTOPRAZOLE SODIUM 40 MG: 40 INJECTION, POWDER, FOR SOLUTION INTRAVENOUS at 21:16

## 2022-01-01 RX ADMIN — HYDRALAZINE HYDROCHLORIDE 25 MG: 25 TABLET, FILM COATED ORAL at 17:20

## 2022-01-01 RX ADMIN — Medication 2 UNITS: at 08:11

## 2022-01-01 RX ADMIN — CIPROFLOXACIN 400 MG: 2 INJECTION, SOLUTION INTRAVENOUS at 06:28

## 2022-01-01 RX ADMIN — METOCLOPRAMIDE HYDROCHLORIDE 10 MG: 5 INJECTION INTRAMUSCULAR; INTRAVENOUS at 12:32

## 2022-01-01 RX ADMIN — SODIUM CHLORIDE 150 ML/HR: 9 INJECTION, SOLUTION INTRAVENOUS at 14:21

## 2022-01-01 RX ADMIN — AMLODIPINE BESYLATE 10 MG: 5 TABLET ORAL at 10:41

## 2022-01-01 RX ADMIN — Medication 2 UNITS: at 12:34

## 2022-01-01 RX ADMIN — INSULIN GLARGINE 20 UNITS: 100 INJECTION, SOLUTION SUBCUTANEOUS at 21:16

## 2022-01-01 NOTE — PROGRESS NOTES
Hospitalist Progress Note    Patient: Janel Noe MRN: [id number]  CSN: [id number]    YOB: 1975  Age: 42 y.o. Sex: male    DOA: 12/30/2021 LOS:  LOS: 2 days            Assessment/Plan     1. Intractable nausea/ vomiting- improving but abdominal pain persists  2. Abdominal pain. Some colitis noted on CT but he is not having diarrhea/ any BM since yesterday. Suspect gastroparesis as etiology  3. MILADIS due to dehydration, slightly improved  4. DM2- controlled  5. HTN- uncontrolled  6. Possible aspiration- no hypoxia, no infiltrate on CXR- doubt       Plan:  - contineu fluids  - IV reglan  - nephrology consult  - add norvasc  - cont basal/bolus insulin  - gastric emptying study pending      Patient Active Problem List   Diagnosis Code    Colitis K52.9    Diarrhea R19.7    MILADIS (acute kidney injury) ([facility] [address] N17.9    DM (diabetes mellitus) ([facility] [address] E11.9    Nausea & vomiting R11.2    Diabetes mellitus ([facility] [address] E11.9    Generalized abdominal pain R10.84    Acute kidney injury ([facility] [address] N17.9               Subjective:    cc: my stomach still hurts  No acute events overnight  toelrating clears hasnt tried fulls  No vomiting      REVIEW OF SYSTEMS:  General: No fevers or chills. Cardiovascular: No chest pain or pressure. No palpitations. Pulmonary: No shortness of breath. Gastrointestinal: No nausea, vomiting. Objective:        Vital signs/Intake and Output:  Visit Vitals  BP (!) 193/89   Pulse (!) 101   Temp 98.2 °F (36.8 °C)   Resp 17   Ht 5' 6\" (1.676 m)   Wt 90.7 kg (200 lb)   SpO2 98%   BMI 32.28 kg/m²     Current Shift:  No intake/output data recorded. Last three shifts:  12/30 1901 - 01/01 0700  In: 3357.5 [P.O.:1280; I.V.:2077.5]  Out: 2150 [Urine:2150]    Body mass index is 32.28 kg/m².     Physical Exam:  GEN: Alert and oriented times three NAD  EYES: conjunctiva normal, lids with out lesions  HEENT: MMM, No thyromegaly, no lymphadenopathy  HEART: RRR +S1 +S2, no JVD, pulses 2+ distally  LUNGS: CTA B/L no wheezes, rales or rhonchi  ABDOMEN: + BS, soft NT/ND no organomegaly,  No rebound  EXTREMITIES: No edema cyanosis, cap refill normal   SKIN: no rashes or skin breakdown, no nodules, normal turgor  Current Facility-Administered Medications   Medication Dose Route Frequency    amLODIPine (NORVASC) tablet 10 mg  10 mg Oral DAILY    morphine injection 2 mg  2 mg IntraVENous Q4H PRN    HYDROmorphone (DILAUDID) injection 0.5 mg  0.5 mg IntraVENous Q4H PRN    metoclopramide HCl (REGLAN) injection 10 mg  10 mg IntraVENous Q6H    insulin lispro (HUMALOG) injection   SubCUTAneous AC&HS    glucose chewable tablet 16 g  4 Tablet Oral PRN    glucagon (GLUCAGEN) injection 1 mg  1 mg IntraMUSCular PRN    dextrose (D50W) injection syrg 12.5-25 g  25-50 mL IntraVENous PRN    0.9% sodium chloride infusion  150 mL/hr IntraVENous CONTINUOUS    ciprofloxacin (CIPRO) 400 mg in D5W IVPB (premix)  400 mg IntraVENous Q12H    sodium chloride (NS) flush 5-40 mL  5-40 mL IntraVENous Q8H    sodium chloride (NS) flush 5-40 mL  5-40 mL IntraVENous PRN    acetaminophen (TYLENOL) tablet 650 mg  650 mg Oral Q6H PRN    Or    acetaminophen (TYLENOL) suppository 650 mg  650 mg Rectal Q6H PRN    polyethylene glycol (MIRALAX) packet 17 g  17 g Oral DAILY PRN    ondansetron (ZOFRAN ODT) tablet 4 mg  4 mg Oral Q8H PRN    Or    ondansetron (ZOFRAN) injection 4 mg  4 mg IntraVENous Q6H PRN    enoxaparin (LOVENOX) injection 30 mg  30 mg SubCUTAneous DAILY    pantoprazole (PROTONIX) injection 40 mg  40 mg IntraVENous Q12H    metroNIDAZOLE 250 mg in sodium chloride IVPB  250 mg IntraVENous Q12H    insulin glargine (LANTUS) injection 20 Units  20 Units SubCUTAneous QHS    gabapentin (NEURONTIN) capsule 300 mg  300 mg Oral BID         All the patient's labs over the past 24 hours were reviewed both during my initial daily workflow process and at the time notated as \"note time\" in [facility].   (It is not time stamped separately in this workflow.)  Select labs are listed below.         Labs: Results:       Chemistry Recent Labs     01/01/22  0400 12/31/21  0133 12/30/21  1135   * 224* 265*    141 138   K 4.3 4.4 4.3   * 110 106   CO2 23 24 22   BUN 33* 34* 35*   CREA 3.28* 3.26* 3.47*   CA 8.6 8.6 9.3   AGAP 7 7 10   BUCR 10* 10* 10*   AP 88 93 110   TP 6.3* 6.7 7.7   ALB 2.9* 3.1* 3.7   GLOB 3.4 3.6 4.0   AGRAT 0.9 0.9 0.9      CBC w/Diff Recent Labs     01/01/22  0400 12/31/21  0133 12/30/21  1135   WBC 11.4 11.2 11.3   RBC 4.89 4.97 5.40   HGB 12.7* 13.0 14.4   HCT 38.8 39.2 42.0    307 315   GRANS 67 81* 85*   LYMPH 19* 12* 11*   EOS 2 0 0                      Liver Enzymes Recent Labs     01/01/22  0400   TP 6.3*   ALB 2.9*   AP 88      Thyroid Studies No results found for: T4, T3U, TSH, TSHEXT     Procedures/imaging: see electronic medical records for all procedures/Xrays and details which were not copied into this note but were reviewed prior to creation of [facility], DO  Internal Medicine/Geriatrics

## 2022-01-01 NOTE — PROGRESS NOTES
1905 - Assumed care at this time. 2008 - Pt A&Ox4, on RA. Denies chest pain/SOB. Denies n/v at this time. Lung sounds clear. Pain rated 4/10. No concerns voiced. Pt educated on pain mgmt. Telephone and call bell within reach, bed in lowest position. Pt encouraged to call for assistance.

## 2022-01-01 NOTE — PROGRESS NOTES
0907 Patient in bed this time, AM medications tolerated well. A/O x 4. Lungs clear, abdomen soft and non-distended, but tender to touch. Bowel sounds active, 18 G IV to right AC, infusing. No signs of phlebitis or infiltration noted. Skin warm ,dry and intact. Patient complains of pain 9/10, PRN Morphine given per MAR. Bed placed in lowest position, call bell within reach. 1820 Pain 8/10. PRN Morphine 2 mg IV pain medication administered at this time. Patient has been educated on side effects. Side effect education sheets have been provided.

## 2022-01-01 NOTE — CONSULTS
RENAL CONSULT  2022    Patient:  Erika Payton  :  1975  Gender:  male  MRN #:  [id number]    Reason for Consult: Acute renal failure     Assessment: Erika Payton is a 42y.o. year old male  42 y.o.  male who has hx of DM, HTN,Neuropathy admitted for colitis, reportedly had more than 10 episodes of watery diarrhea on  and h/o loose stool/diarrhoea for 1 yr and uncontrolled DM. Baseline serum creatinine on  was 0.94 mg/dl on admission it was 3.47 mg/dl and is 3.28 mg/dl on    Ct abdomen and pelvis does not have evidence of hydronephrosis and obstruction . He has decent urine output overnight . Hemodynamically stable without evidence of hypotension. No evidence of proteinuria and hematuria on urine analysis . So based on presentation Acute renal failure most likely is  pre renal volume depletion due to watery diarrhoea vs toxin related ATN due to infection/colitis     Plan:    - will send urine electrolytes, UPC, add CKP  - Ensure that he does not retain urine  - Continue volume expansion with  cc/hr, close attention to BP   - Intake and output  - Hold ACEI/ARBS and lasix  - Continue amlodipine for hypertension   - Avoid NSAIDS,.contrats and nephrotoxin  - Dose all meds and antibiotics for current eGFR  - No clinical and metabolic indication of RRT  - He has risk factors for CKD, optimum diabetic and BP management         History of Present Illness:    Erika Payton is a 42y.o. year old male  42 y.o.  male who has hx of DM, HTN,Neuropathy, presents to ER with   3 day history of nausea, vomiting, and diarrhea. Patient reports that he has loose stool/diarrhoea for last one year . On  he had more than 10 times diarrhea along with abdominal pain and nausea  Denied taking NSAIDS, reports diabetes is not well controlled   He does not have problems in urinating       Past Medical History:   Diagnosis Date    Diabetes ([address]     Hypertension      History reviewed.  No pertinent surgical history. History reviewed. No pertinent family history.   No Known Allergies  Current Facility-Administered Medications   Medication Dose Route Frequency Provider Last Rate Last Admin    amLODIPine (NORVASC) tablet 10 mg  10 mg Oral DAILY Artelia Alis, DO   10 mg at 01/01/22 1041    morphine injection 2 mg  2 mg IntraVENous Q4H PRN Artelia Alis, DO   2 mg at 01/01/22 4627    HYDROmorphone (DILAUDID) injection 0.5 mg  0.5 mg IntraVENous Q4H PRN Artelia Alis, DO        metoclopramide HCl (REGLAN) injection 10 mg  10 mg IntraVENous Q6H Artelia Alis, DO   10 mg at 01/01/22 6888    insulin lispro (HUMALOG) injection   SubCUTAneous AC&HS Lexy Soria MD   2 Units at 01/01/22 4527    glucose chewable tablet 16 g  4 Tablet Oral PRN Lexy Soria MD        glucagon (GLUCAGEN) injection 1 mg  1 mg IntraMUSCular PRN Lexy Soria MD        dextrose (D50W) injection syrg 12.5-25 g  25-50 mL IntraVENous PRN Lexy Soira MD        0.9% sodium chloride infusion  150 mL/hr IntraVENous CONTINUOUS Lexy Soria  mL/hr at 12/31/21 2347 150 mL/hr at 12/31/21 2347    ciprofloxacin (CIPRO) 400 mg in D5W IVPB (premix)  400 mg IntraVENous Q12H Lexy Sorai  mL/hr at 01/01/22 0628 400 mg at 01/01/22 0216    sodium chloride (NS) flush 5-40 mL  5-40 mL IntraVENous Q8H Lexy Soria MD   10 mL at 01/01/22 2023    sodium chloride (NS) flush 5-40 mL  5-40 mL IntraVENous PRN Lexy Soria MD        acetaminophen (TYLENOL) tablet 650 mg  650 mg Oral Q6H PRN Lexy Soria MD        Or   Farshad Torreers acetaminophen (TYLENOL) suppository 650 mg  650 mg Rectal Q6H PRN Lexy Soria MD        polyethylene glycol (MIRALAX) packet 17 g  17 g Oral DAILY PRN Lexy Soria MD        ondansetron (ZOFRAN ODT) tablet 4 mg  4 mg Oral Q8H PRN Lexy Soria MD        Or    ondansetron (ZOFRAN) injection 4 mg  4 mg IntraVENous Q6H PRN George Soria MD        enoxaparin (LOVENOX) injection 30 mg  30 mg SubCUTAneous DAILY George Soria MD   30 mg at 01/01/22 0812    pantoprazole (PROTONIX) injection 40 mg  40 mg IntraVENous Q12H George Soria MD   40 mg at 01/01/22 0811    metroNIDAZOLE 250 mg in sodium chloride IVPB  250 mg IntraVENous Q12H George Soria MD 50 mL/hr at 12/31/21 2344 250 mg at 12/31/21 2344    insulin glargine (LANTUS) injection 20 Units  20 Units SubCUTAneous QHS George Soria MD   20 Units at 12/31/21 2138    gabapentin (NEURONTIN) capsule 300 mg  300 mg Oral BID George Soria MD   300 mg at 01/01/22 2297       [unfilled]    Review of Symptoms:     Consitutional Symptoms: No fever, weight loss, weight gain and fatigue  Eyes:- No change in vision , no itching   Ears, Nose, Mouth, Throat:  No neck pain , swelling ,hearing loss and nose bleed. Pulmonary: No cough and shortness of breath . CVS: No  Chest pain , palpitation and orthopnea  GI:still has nausea, loose stool and abdominal pain   - No burning, frequency ,urgency  and difficulty voiding . Neurological:No dizzy ness, syncope, focal weakness and numbness . Skin : No rash and erythema   Endocrine: No feeling of excessive cold or warmth, hot flushes  Psychiatric: Denied feeling depressed    Objective:    Visit Vitals  BP (!) 175/99   Pulse 93   Temp 98.8 °F (37.1 °C)   Resp 16   Ht 5' 6\" (1.676 m)   Wt 90.7 kg (200 lb)   SpO2 97%   BMI 32.28 kg/m²       Physical Exam:    Pt awake,  alert and comfortable  HEENT: No JVD, anicteric sclera, no neck swelling  Lung: clear to auscultation, no crackles and wheeze  Heart: s1s2 regualr no rubs or murmur  Abdomen: soft, non tender, no guarding, normal bowel sounds.   Ext: no edema and pulsation intact  Skin: No rash and erythema  CNS- Oriented to time , place and person     Laboratory Data:    Lab Results   Component Value Date    BUN 33 (H) 01/01/2022 BUN 34 (H) 12/31/2021    BUN 35 (H) 12/30/2021     01/01/2022     12/31/2021     12/30/2021    CO2 23 01/01/2022    CO2 24 12/31/2021    CO2 22 12/30/2021     Lab Results   Component Value Date    WBC 11.4 01/01/2022    HGB 12.7 (L) 01/01/2022    HCT 38.8 01/01/2022     No components found for: CALCIUM, PHOSPHORUS, MAGNESIUM  No results found for: HDL  No results found for: SPECIMENTYP, TURBIDITY, UGLU    Imaging Reveiwed:    CT abdomen/pelvis 12/30/21: Mild proximal sigmoid colonic wall thickening and inflammatory changes  suggesting an infectious or inflammatory colitis.     > No evidence of an organized or drainable fluid collection.     > No gross free intraperitoneal perforation.     2.  No evidence of urolithiasis or obstructive uropathy.           Zeus Nichols MD   [facility]- NEPHROLOGY

## 2022-01-01 NOTE — ROUTINE PROCESS
Bedside and Verbal shift change report given to EDMAR Grier RN by Latoya Gresham RN. Report included the following information SBAR, Kardex, Intake/Output and MAR.

## 2022-01-02 LAB
ALBUMIN SERPL-MCNC: 3.1 G/DL (ref 3.4–5)
ALBUMIN/GLOB SERPL: 0.9 {RATIO} (ref 0.8–1.7)
ALP SERPL-CCNC: 88 U/L (ref 45–117)
ALT SERPL-CCNC: 18 U/L (ref 16–61)
ANION GAP SERPL CALC-SCNC: 9 MMOL/L (ref 3–18)
AST SERPL-CCNC: 17 U/L (ref 10–38)
ATRIAL RATE: 93 BPM
BASOPHILS # BLD: 0 K/UL (ref 0–0.1)
BASOPHILS NFR BLD: 0 % (ref 0–2)
BILIRUB SERPL-MCNC: 0.7 MG/DL (ref 0.2–1)
BUN SERPL-MCNC: 33 MG/DL (ref 7–18)
BUN/CREAT SERPL: 10 (ref 12–20)
CALCIUM SERPL-MCNC: 8.8 MG/DL (ref 8.5–10.1)
CALCULATED P AXIS, ECG09: 66 DEGREES
CALCULATED R AXIS, ECG10: 23 DEGREES
CALCULATED T AXIS, ECG11: 76 DEGREES
CHLORIDE SERPL-SCNC: 110 MMOL/L (ref 100–111)
CO2 SERPL-SCNC: 20 MMOL/L (ref 21–32)
CREAT SERPL-MCNC: 3.15 MG/DL (ref 0.6–1.3)
DIAGNOSIS, 93000: NORMAL
DIFFERENTIAL METHOD BLD: ABNORMAL
EOSINOPHIL # BLD: 0.2 K/UL (ref 0–0.4)
EOSINOPHIL NFR BLD: 2 % (ref 0–5)
ERYTHROCYTE [DISTWIDTH] IN BLOOD BY AUTOMATED COUNT: 12.3 % (ref 11.6–14.5)
GLOBULIN SER CALC-MCNC: 3.4 G/DL (ref 2–4)
GLUCOSE BLD STRIP.AUTO-MCNC: 142 MG/DL (ref 70–110)
GLUCOSE BLD STRIP.AUTO-MCNC: 143 MG/DL (ref 70–110)
GLUCOSE BLD STRIP.AUTO-MCNC: 151 MG/DL (ref 70–110)
GLUCOSE BLD STRIP.AUTO-MCNC: 154 MG/DL (ref 70–110)
GLUCOSE SERPL-MCNC: 126 MG/DL (ref 74–99)
HCT VFR BLD AUTO: 40.5 % (ref 36–48)
HGB BLD-MCNC: 13.7 G/DL (ref 13–16)
IMM GRANULOCYTES # BLD AUTO: 0 K/UL (ref 0–0.04)
IMM GRANULOCYTES NFR BLD AUTO: 0 % (ref 0–0.5)
LYMPHOCYTES # BLD: 2.2 K/UL (ref 0.9–3.6)
LYMPHOCYTES NFR BLD: 17 % (ref 21–52)
MAGNESIUM SERPL-MCNC: 2.2 MG/DL (ref 1.6–2.6)
MCH RBC QN AUTO: 26.5 PG (ref 24–34)
MCHC RBC AUTO-ENTMCNC: 33.8 G/DL (ref 31–37)
MCV RBC AUTO: 78.3 FL (ref 78–100)
MONOCYTES # BLD: 1.2 K/UL (ref 0.05–1.2)
MONOCYTES NFR BLD: 10 % (ref 3–10)
NEUTS SEG # BLD: 8.9 K/UL (ref 1.8–8)
NEUTS SEG NFR BLD: 71 % (ref 40–73)
NRBC # BLD: 0 K/UL (ref 0–0.01)
NRBC BLD-RTO: 0 PER 100 WBC
P-R INTERVAL, ECG05: 142 MS
PLATELET # BLD AUTO: 293 K/UL (ref 135–420)
PMV BLD AUTO: 9.9 FL (ref 9.2–11.8)
POTASSIUM SERPL-SCNC: 4.3 MMOL/L (ref 3.5–5.5)
PROT SERPL-MCNC: 6.5 G/DL (ref 6.4–8.2)
Q-T INTERVAL, ECG07: 334 MS
QRS DURATION, ECG06: 74 MS
QTC CALCULATION (BEZET), ECG08: 415 MS
RBC # BLD AUTO: 5.17 M/UL (ref 4.35–5.65)
SODIUM SERPL-SCNC: 139 MMOL/L (ref 136–145)
VENTRICULAR RATE, ECG03: 93 BPM
WBC # BLD AUTO: 12.5 K/UL (ref 4.6–13.2)

## 2022-01-02 PROCEDURE — 65270000029 HC RM PRIVATE

## 2022-01-02 PROCEDURE — 74011250637 HC RX REV CODE- 250/637: Performed by: INTERNAL MEDICINE

## 2022-01-02 PROCEDURE — 74011636637 HC RX REV CODE- 636/637: Performed by: INTERNAL MEDICINE

## 2022-01-02 PROCEDURE — 82962 GLUCOSE BLOOD TEST: CPT

## 2022-01-02 PROCEDURE — 74011000250 HC RX REV CODE- 250: Performed by: INTERNAL MEDICINE

## 2022-01-02 PROCEDURE — 83735 ASSAY OF MAGNESIUM: CPT

## 2022-01-02 PROCEDURE — 80053 COMPREHEN METABOLIC PANEL: CPT

## 2022-01-02 PROCEDURE — 36415 COLL VENOUS BLD VENIPUNCTURE: CPT

## 2022-01-02 PROCEDURE — C9113 INJ PANTOPRAZOLE SODIUM, VIA: HCPCS | Performed by: INTERNAL MEDICINE

## 2022-01-02 PROCEDURE — 74011250636 HC RX REV CODE- 250/636: Performed by: INTERNAL MEDICINE

## 2022-01-02 PROCEDURE — 74011250636 HC RX REV CODE- 250/636: Performed by: HOSPITALIST

## 2022-01-02 PROCEDURE — 85025 COMPLETE CBC W/AUTO DIFF WBC: CPT

## 2022-01-02 RX ORDER — MORPHINE SULFATE 2 MG/ML
2 INJECTION, SOLUTION INTRAMUSCULAR; INTRAVENOUS
Status: DISCONTINUED | OUTPATIENT
Start: 2022-01-02 | End: 2022-01-12 | Stop reason: HOSPADM

## 2022-01-02 RX ORDER — HYDRALAZINE HYDROCHLORIDE 25 MG/1
50 TABLET, FILM COATED ORAL 3 TIMES DAILY
Status: DISCONTINUED | OUTPATIENT
Start: 2022-01-02 | End: 2022-01-02

## 2022-01-02 RX ORDER — METOCLOPRAMIDE HYDROCHLORIDE 5 MG/ML
5 INJECTION INTRAMUSCULAR; INTRAVENOUS EVERY 6 HOURS
Status: DISCONTINUED | OUTPATIENT
Start: 2022-01-02 | End: 2022-01-03

## 2022-01-02 RX ORDER — HYDRALAZINE HYDROCHLORIDE 25 MG/1
50 TABLET, FILM COATED ORAL 3 TIMES DAILY
Status: DISCONTINUED | OUTPATIENT
Start: 2022-01-02 | End: 2022-01-03

## 2022-01-02 RX ORDER — CIPROFLOXACIN 250 MG/1
250 TABLET, FILM COATED ORAL EVERY 12 HOURS
Status: DISCONTINUED | OUTPATIENT
Start: 2022-01-02 | End: 2022-01-05

## 2022-01-02 RX ORDER — METRONIDAZOLE 250 MG/1
500 TABLET ORAL 3 TIMES DAILY
Status: DISCONTINUED | OUTPATIENT
Start: 2022-01-02 | End: 2022-01-06

## 2022-01-02 RX ADMIN — GABAPENTIN 300 MG: 300 CAPSULE ORAL at 11:39

## 2022-01-02 RX ADMIN — METOCLOPRAMIDE HYDROCHLORIDE 10 MG: 5 INJECTION INTRAMUSCULAR; INTRAVENOUS at 00:35

## 2022-01-02 RX ADMIN — SODIUM CHLORIDE, PRESERVATIVE FREE 10 ML: 5 INJECTION INTRAVENOUS at 06:21

## 2022-01-02 RX ADMIN — HYDRALAZINE HYDROCHLORIDE 50 MG: 25 TABLET, FILM COATED ORAL at 21:44

## 2022-01-02 RX ADMIN — METRONIDAZOLE 250 MG: 500 INJECTION, SOLUTION INTRAVENOUS at 02:26

## 2022-01-02 RX ADMIN — METRONIDAZOLE 500 MG: 250 TABLET ORAL at 18:27

## 2022-01-02 RX ADMIN — METOCLOPRAMIDE HYDROCHLORIDE 5 MG: 5 INJECTION INTRAMUSCULAR; INTRAVENOUS at 18:27

## 2022-01-02 RX ADMIN — ENOXAPARIN SODIUM 30 MG: 100 INJECTION SUBCUTANEOUS at 11:39

## 2022-01-02 RX ADMIN — MORPHINE SULFATE 2 MG: 2 INJECTION, SOLUTION INTRAMUSCULAR; INTRAVENOUS at 07:17

## 2022-01-02 RX ADMIN — GABAPENTIN 300 MG: 300 CAPSULE ORAL at 21:44

## 2022-01-02 RX ADMIN — Medication 2 UNITS: at 11:39

## 2022-01-02 RX ADMIN — METRONIDAZOLE 500 MG: 250 TABLET ORAL at 21:44

## 2022-01-02 RX ADMIN — INSULIN GLARGINE 20 UNITS: 100 INJECTION, SOLUTION SUBCUTANEOUS at 21:46

## 2022-01-02 RX ADMIN — CIPROFLOXACIN 400 MG: 2 INJECTION, SOLUTION INTRAVENOUS at 06:21

## 2022-01-02 RX ADMIN — PANTOPRAZOLE SODIUM 40 MG: 40 INJECTION, POWDER, FOR SOLUTION INTRAVENOUS at 11:39

## 2022-01-02 RX ADMIN — SODIUM CHLORIDE 100 ML/HR: 9 INJECTION, SOLUTION INTRAVENOUS at 00:36

## 2022-01-02 RX ADMIN — SODIUM CHLORIDE, PRESERVATIVE FREE 10 ML: 5 INJECTION INTRAVENOUS at 21:46

## 2022-01-02 RX ADMIN — HYDRALAZINE HYDROCHLORIDE 50 MG: 25 TABLET, FILM COATED ORAL at 18:27

## 2022-01-02 RX ADMIN — METOCLOPRAMIDE HYDROCHLORIDE 10 MG: 5 INJECTION INTRAMUSCULAR; INTRAVENOUS at 06:21

## 2022-01-02 RX ADMIN — AMLODIPINE BESYLATE 10 MG: 5 TABLET ORAL at 11:39

## 2022-01-02 RX ADMIN — METOCLOPRAMIDE HYDROCHLORIDE 5 MG: 5 INJECTION INTRAMUSCULAR; INTRAVENOUS at 11:47

## 2022-01-02 RX ADMIN — CIPROFLOXACIN 250 MG: 250 TABLET ORAL at 18:32

## 2022-01-02 RX ADMIN — PANTOPRAZOLE SODIUM 40 MG: 40 INJECTION, POWDER, FOR SOLUTION INTRAVENOUS at 21:44

## 2022-01-02 RX ADMIN — ACETAMINOPHEN 650 MG: 325 TABLET ORAL at 18:27

## 2022-01-02 RX ADMIN — MORPHINE SULFATE 2 MG: 2 INJECTION, SOLUTION INTRAMUSCULAR; INTRAVENOUS at 11:47

## 2022-01-02 NOTE — PROGRESS NOTES
RENAL CONSULT PROGRESS NOTE   2022    Patient:  Shannon Gaytan  :  1975  Gender:  male  MRN #:  [id number]    Reason for Consult: Acute renal failure     Assessment: Shannon Gaytan is a 42y.o. year old male  42 y.o.  male who has hx of DM, HTN,Neuropathy admitted for colitis, reportedly had more than 10 episodes of watery diarrhea on  and h/o loose stool/diarrhoea for 1 yr and uncontrolled DM. Baseline serum creatinine on  was 0.94 mg/dl on admission it was 3.47 mg/dl and is 3.28 mg/dl on    Ct abdomen and pelvis does not have evidence of hydronephrosis and obstruction . He has decent urine output overnight . Hemodynamically stable without evidence of hypotension. No evidence of proteinuria and hematuria on urine analysis .    So based on presentation Acute renal failure most likely is  pre renal volume depletion due to watery diarrhoea vs toxin related ATN due to infection/colitis       SUBJECTIVE:   -He says he feels little better than yesterday  Denied problems in urinating   No nausea and vomiting       Plan:   - Decent urine output overnight with slight drop in serum creatinine   - Urine electrolytes now is not consistent with pre re-renal. Considering poor intake would continue normal saline 100 cc/hpur until tomorrow morning , close attention to BP   - Ensure that he does not retain urine  -- Intake and output  - Hold ACEI/ARBS and lasix  - Continue amlodipine for hypertension , add hydralazine if remained elevated   - Avoid NSAIDS,.contrats and nephrotoxin  - Dose all meds and antibiotics for current eGFR  - No clinical and metabolic indication of RRT  - He has risk factors for CKD, optimum diabetic and BP management       Objective:    Visit Vitals  BP (!) 178/101 (BP 1 Location: Left upper arm, BP Patient Position: Sitting)   Pulse (!) 106   Temp 98.9 °F (37.2 °C)   Resp 18   Ht 5' 6\" (1.676 m)   Wt 90.7 kg (200 lb)   SpO2 97%   BMI 32.28 kg/m²       Physical Exam:    Pt awake  HEENT:-  no neck swelling  Lung: clear to auscultation  Ext: no edema      Laboratory Data:    Lab Results   Component Value Date    BUN 33 (H) 01/02/2022    BUN 33 (H) 01/01/2022    BUN 34 (H) 12/31/2021     01/02/2022     01/01/2022     12/31/2021    CO2 20 (L) 01/02/2022    CO2 23 01/01/2022    CO2 24 12/31/2021     Lab Results   Component Value Date    WBC 12.5 01/02/2022    HGB 13.7 01/02/2022    HCT 40.5 01/02/2022     No components found for: CALCIUM, PHOSPHORUS, MAGNESIUM  No results found for: HDL  No results found for: SPECIMENTYP, TURBIDITY, UGLU    Imaging Reveiwed:    CT abdomen/pelvis 12/30/21: Mild proximal sigmoid colonic wall thickening and inflammatory changes  suggesting an infectious or inflammatory colitis.     > No evidence of an organized or drainable fluid collection.     > No gross free intraperitoneal perforation.     2.  No evidence of urolithiasis or obstructive uropathy.           Da Dubon MD   [facility] NEPHROLOGY

## 2022-01-02 NOTE — PROGRESS NOTES
Problem: Risk for Spread of Infection  Goal: Prevent transmission of infectious organism to others  Description: Prevent the transmission of infectious organisms to other patients, staff members, and visitors. Outcome: Progressing Towards Goal     Problem: Patient Education:  Go to Education Activity  Goal: Patient/Family Education  Outcome: Progressing Towards Goal     Problem: Diabetes Self-Management  Goal: *Disease process and treatment process  Description: Define diabetes and identify own type of diabetes; list 3 options for treating diabetes. Outcome: Progressing Towards Goal  Goal: *Incorporating nutritional management into lifestyle  Description: Describe effect of type, amount and timing of food on blood glucose; list 3 methods for planning meals. Outcome: Progressing Towards Goal  Goal: *Incorporating physical activity into lifestyle  Description: State effect of exercise on blood glucose levels. Outcome: Progressing Towards Goal  Goal: *Developing strategies to promote health/change behavior  Description: Define the ABC's of diabetes; identify appropriate screenings, schedule and personal plan for screenings. Outcome: Progressing Towards Goal  Goal: *Using medications safely  Description: State effect of diabetes medications on diabetes; name diabetes medication taking, action and side effects. Outcome: Progressing Towards Goal  Goal: *Monitoring blood glucose, interpreting and using results  Description: Identify recommended blood glucose targets  and personal targets. Outcome: Progressing Towards Goal  Goal: *Prevention, detection, treatment of acute complications  Description: List symptoms of hyper- and hypoglycemia; describe how to treat low blood sugar and actions for lowering  high blood glucose level.   Outcome: Progressing Towards Goal  Goal: *Prevention, detection and treatment of chronic complications  Description: Define the natural course of diabetes and describe the relationship of blood glucose levels to long term complications of diabetes. Outcome: Progressing Towards Goal  Goal: *Developing strategies to address psychosocial issues  Description: Describe feelings about living with diabetes; identify support needed and support network  Outcome: Progressing Towards Goal  Goal: *Insulin pump training  Outcome: Progressing Towards Goal  Goal: *Sick day guidelines  Outcome: Progressing Towards Goal  Goal: *Patient Specific Goal (EDIT GOAL, INSERT TEXT)  Outcome: Progressing Towards Goal     Problem: Patient Education: Go to Patient Education Activity  Goal: Patient/Family Education  Outcome: Progressing Towards Goal     Problem: Falls - Risk of  Goal: *Absence of Falls  Description: Document Joshua Fall Risk and appropriate interventions in the flowsheet.   Outcome: Progressing Towards Goal  Note: Fall Risk Interventions:  Mobility Interventions: Communicate number of staff needed for ambulation/transfer         Medication Interventions: Teach patient to arise slowly    Elimination Interventions: Call light in reach,Urinal in reach              Problem: Patient Education: Go to Patient Education Activity  Goal: Patient/Family Education  Outcome: Progressing Towards Goal

## 2022-01-02 NOTE — PROGRESS NOTES
1907 - Assumed care. 2110 - Pt A&Ox4, on RA. Denies chest pain/SOB. Denies n/v at this time. Lung sounds clear. Pain rated 4/10. No concerns voiced. Pt educated on pain mgmt. Telephone and call bell within reach, bed in lowest position. Pt encouraged to call for assistance. 2216 - Patient resting quietly in bed with eyes closed. Respirations even and unlabored with no signs of distress upon entering room. No concerns voiced. [place] - Patient resting quietly in bed with eyes closed. Respirations even and unlabored with no signs of distress upon entering room. No concerns voiced. 5 - Patient resting quietly in bed with eyes closed. Respirations even and unlabored with no signs of distress upon entering room. No concerns voiced. 4744 - Patient resting quietly in bed with eyes closed. Respirations even and unlabored with no signs of distress upon entering room. No concerns voiced. 8276 - Patient rated pain 8/10, pain medication administered per MAR. No other concerns voiced at this time. Call bell within reach, bed in lowest position. Pt encouraged to use call bell for any needs.

## 2022-01-02 NOTE — ROUTINE PROCESS
Bedside and Verbal shift change report given to TODD Baig RN by Fercho Rollins RN. Report included the following information SBAR, Kardex, Intake/Output and MAR.

## 2022-01-02 NOTE — PROGRESS NOTES
Hospitalist Progress Note    Patient: Uli Canas MRN: [id number]  CSN: [id number]    YOB: 1975  Age: 42 y.o. Sex: male    DOA: 12/30/2021 LOS:  LOS: 3 days            Assessment/Plan     1. Intractable nausea/ vomiting- continues to improve. Tolerating full liquid diet  2. Abdominal pain. Some colitis noted on CT but he is not having diarrhea. Suspect gastroparesis as etiology  3. MILADIS due to dehydration, slowly improving, nephrology following  4. DM2- controlled  5. HTN- uncontrolled  6. Possible aspiration- no hypoxia, no infiltrate on CXR- doubt  7. Grade II diastolic dysfunction- compensated     Plan:  - change IV abx to po  - decr reglan  - decr frequency of morphine administration  - increase hydralazine dosing  - OOB  -gastric emptying study pending      Patient Active Problem List   Diagnosis Code    Colitis K52.9    Diarrhea R19.7    MILADIS (acute kidney injury) ([facility] [address] N17.9    DM (diabetes mellitus) ([facility]) E11.9    Nausea & vomiting R11.2    Diabetes mellitus ([address] E11.9    Generalized abdominal pain R10.84    Acute kidney injury ([facility] [address] N17.9               Subjective:    cc: \" I feel better today than yesterday\"  No acute events overnight  Abdominal pain improving,   No nausea. Vomiting  No diarrhea      REVIEW OF SYSTEMS:  General: No fevers or chills. Cardiovascular: No chest pain or pressure. No palpitations. Pulmonary: No shortness of breath. Gastrointestinal: No nausea, vomiting. Objective:        Vital signs/Intake and Output:  Visit Vitals  BP (!) 161/95   Pulse 99   Temp 98.6 °F (37 °C)   Resp 18   Ht 5' 6\" (1.676 m)   Wt 90.7 kg (200 lb)   SpO2 94%   BMI 32.28 kg/m²     Current Shift:  01/02 0701 - 01/02 1900  In: -   Out: 1000 [Urine:1000]  Last three shifts:  12/31 1901 - 01/02 0700  In: -   Out: 3700 [Urine:3700]    Body mass index is 32.28 kg/m².     Physical Exam:  GEN: Alert and oriented times three NAD  EYES: conjunctiva normal, lids with out lesions  HEENT: MMM, No thyromegaly, no lymphadenopathy  HEART: RRR +S1 +S2, no JVD, pulses 2+ distally  LUNGS: CTA B/L no wheezes, rales or rhonchi  ABDOMEN: + BS, soft NT/ND no organomegaly,  No rebound  EXTREMITIES: No edema cyanosis, cap refill normal   SKIN: no rashes or skin breakdown, no nodules, normal turgor  Current Facility-Administered Medications   Medication Dose Route Frequency    ciprofloxacin HCl (CIPRO) tablet 250 mg  250 mg Oral Q12H    metroNIDAZOLE (FLAGYL) tablet 500 mg  500 mg Oral TID    morphine injection 2 mg  2 mg IntraVENous Q6H PRN    metoclopramide HCl (REGLAN) injection 5 mg  5 mg IntraVENous Q6H    amLODIPine (NORVASC) tablet 10 mg  10 mg Oral DAILY    insulin lispro (HUMALOG) injection   SubCUTAneous AC&HS    glucose chewable tablet 16 g  4 Tablet Oral PRN    glucagon (GLUCAGEN) injection 1 mg  1 mg IntraMUSCular PRN    dextrose (D50W) injection syrg 12.5-25 g  25-50 mL IntraVENous PRN    0.9% sodium chloride infusion  100 mL/hr IntraVENous CONTINUOUS    sodium chloride (NS) flush 5-40 mL  5-40 mL IntraVENous Q8H    sodium chloride (NS) flush 5-40 mL  5-40 mL IntraVENous PRN    acetaminophen (TYLENOL) tablet 650 mg  650 mg Oral Q6H PRN    Or    acetaminophen (TYLENOL) suppository 650 mg  650 mg Rectal Q6H PRN    polyethylene glycol (MIRALAX) packet 17 g  17 g Oral DAILY PRN    ondansetron (ZOFRAN ODT) tablet 4 mg  4 mg Oral Q8H PRN    Or    ondansetron (ZOFRAN) injection 4 mg  4 mg IntraVENous Q6H PRN    enoxaparin (LOVENOX) injection 30 mg  30 mg SubCUTAneous DAILY    pantoprazole (PROTONIX) injection 40 mg  40 mg IntraVENous Q12H    insulin glargine (LANTUS) injection 20 Units  20 Units SubCUTAneous QHS    gabapentin (NEURONTIN) capsule 300 mg  300 mg Oral BID         All the patient's labs over the past 24 hours were reviewed both during my initial daily workflow process and at the time notated as \"note time\" in [facility].   (It is not time stamped separately in this workflow.)  Select labs are listed below.         Labs: Results:       Chemistry Recent Labs     01/02/22  0445 01/01/22  0400 12/31/21  0133   * 163* 224*    143 141   K 4.3 4.3 4.4    113* 110   CO2 20* 23 24   BUN 33* 33* 34*   CREA 3.15* 3.28* 3.26*   CA 8.8 8.6 8.6   AGAP 9 7 7   BUCR 10* 10* 10*   AP 88 88 93   TP 6.5 6.3* 6.7   ALB 3.1* 2.9* 3.1*   GLOB 3.4 3.4 3.6   AGRAT 0.9 0.9 0.9      CBC w/Diff Recent Labs     01/02/22 0445 01/01/22 0400 12/31/21  0133   WBC 12.5 11.4 11.2   RBC 5.17 4.89 4.97   HGB 13.7 12.7* 13.0   HCT 40.5 38.8 39.2    288 307   GRANS 71 67 81*   LYMPH 17* 19* 12*   EOS 2 2 0      Cardiac Enzymes Recent Labs     01/01/22  0400                     Liver Enzymes Recent Labs     01/02/22  0445   TP 6.5   ALB 3.1*   AP 88          Procedures/imaging: see electronic medical records for all procedures/Xrays and details which were not copied into this note but were reviewed prior to creation of [address] [place] [address] Rd, DO  Internal Medicine/Geriatrics

## 2022-01-02 NOTE — PROGRESS NOTES
0535 - Bedside shift report received from Megan Aleman RN. Assumed care of patient. Patient noted resting in bed with eyes closed at this time. Call light in reach. 1139 - Assessment completed as per flowsheet. Patient drowsy, but arousable and oriented x4. Respirations even and unlabored. Lungs clear bilaterally. Abdomen round, firm, and tender. Reports no BM today. Voiding without difficulty. PRN morphine administered for abdominal pain 8/10. Patient resting in bed with call light in reach. Moo Suresh with Dr. Wendy Yu and notified of continued elevated BP's.  Stated to reorder hydralazine 50 mg PO TID.    1827 - PRN Tylenol administered for abdominal pain per patient request.

## 2022-01-02 NOTE — ROUTINE PROCESS
Bedside and Verbal shift change report given to [address] by Marshall Hammonds RN.  Report included the following information SBAR, Kardex, OR Summary, Intake/Output and MAR

## 2022-01-03 LAB
ANION GAP SERPL CALC-SCNC: 10 MMOL/L (ref 3–18)
BUN SERPL-MCNC: 37 MG/DL (ref 7–18)
BUN/CREAT SERPL: 12 (ref 12–20)
CALCIUM SERPL-MCNC: 8.4 MG/DL (ref 8.5–10.1)
CHLORIDE SERPL-SCNC: 109 MMOL/L (ref 100–111)
CO2 SERPL-SCNC: 20 MMOL/L (ref 21–32)
CREAT SERPL-MCNC: 3.21 MG/DL (ref 0.6–1.3)
GLUCOSE BLD STRIP.AUTO-MCNC: 109 MG/DL (ref 70–110)
GLUCOSE BLD STRIP.AUTO-MCNC: 130 MG/DL (ref 70–110)
GLUCOSE BLD STRIP.AUTO-MCNC: 136 MG/DL (ref 70–110)
GLUCOSE BLD STRIP.AUTO-MCNC: 150 MG/DL (ref 70–110)
GLUCOSE SERPL-MCNC: 128 MG/DL (ref 74–99)
MAGNESIUM SERPL-MCNC: 2.2 MG/DL (ref 1.6–2.6)
POTASSIUM SERPL-SCNC: 4.3 MMOL/L (ref 3.5–5.5)
SODIUM SERPL-SCNC: 139 MMOL/L (ref 136–145)

## 2022-01-03 PROCEDURE — 74011250637 HC RX REV CODE- 250/637: Performed by: INTERNAL MEDICINE

## 2022-01-03 PROCEDURE — 74011636637 HC RX REV CODE- 636/637: Performed by: INTERNAL MEDICINE

## 2022-01-03 PROCEDURE — 83735 ASSAY OF MAGNESIUM: CPT

## 2022-01-03 PROCEDURE — 74011250636 HC RX REV CODE- 250/636: Performed by: HOSPITALIST

## 2022-01-03 PROCEDURE — 74011000250 HC RX REV CODE- 250: Performed by: INTERNAL MEDICINE

## 2022-01-03 PROCEDURE — 74011250637 HC RX REV CODE- 250/637: Performed by: HOSPITALIST

## 2022-01-03 PROCEDURE — 65270000029 HC RM PRIVATE

## 2022-01-03 PROCEDURE — 36415 COLL VENOUS BLD VENIPUNCTURE: CPT

## 2022-01-03 PROCEDURE — 80048 BASIC METABOLIC PNL TOTAL CA: CPT

## 2022-01-03 PROCEDURE — 74011250636 HC RX REV CODE- 250/636: Performed by: INTERNAL MEDICINE

## 2022-01-03 PROCEDURE — C9113 INJ PANTOPRAZOLE SODIUM, VIA: HCPCS | Performed by: INTERNAL MEDICINE

## 2022-01-03 PROCEDURE — 82962 GLUCOSE BLOOD TEST: CPT

## 2022-01-03 RX ORDER — PANTOPRAZOLE SODIUM 40 MG/1
40 TABLET, DELAYED RELEASE ORAL EVERY 12 HOURS
Status: DISCONTINUED | OUTPATIENT
Start: 2022-01-03 | End: 2022-01-12 | Stop reason: HOSPADM

## 2022-01-03 RX ORDER — HYDRALAZINE HYDROCHLORIDE 25 MG/1
75 TABLET, FILM COATED ORAL 3 TIMES DAILY
Status: DISCONTINUED | OUTPATIENT
Start: 2022-01-03 | End: 2022-01-12 | Stop reason: HOSPADM

## 2022-01-03 RX ORDER — METOCLOPRAMIDE HYDROCHLORIDE 5 MG/ML
5 INJECTION INTRAMUSCULAR; INTRAVENOUS
Status: DISCONTINUED | OUTPATIENT
Start: 2022-01-03 | End: 2022-01-04

## 2022-01-03 RX ADMIN — ENOXAPARIN SODIUM 30 MG: 100 INJECTION SUBCUTANEOUS at 09:20

## 2022-01-03 RX ADMIN — HYDRALAZINE HYDROCHLORIDE 75 MG: 25 TABLET, FILM COATED ORAL at 17:12

## 2022-01-03 RX ADMIN — CIPROFLOXACIN 250 MG: 250 TABLET ORAL at 05:57

## 2022-01-03 RX ADMIN — AMLODIPINE BESYLATE 10 MG: 5 TABLET ORAL at 09:21

## 2022-01-03 RX ADMIN — Medication 2 UNITS: at 12:44

## 2022-01-03 RX ADMIN — CIPROFLOXACIN 250 MG: 250 TABLET ORAL at 17:12

## 2022-01-03 RX ADMIN — METRONIDAZOLE 500 MG: 250 TABLET ORAL at 09:21

## 2022-01-03 RX ADMIN — METRONIDAZOLE 500 MG: 250 TABLET ORAL at 17:12

## 2022-01-03 RX ADMIN — SODIUM CHLORIDE, PRESERVATIVE FREE 10 ML: 5 INJECTION INTRAVENOUS at 05:57

## 2022-01-03 RX ADMIN — SODIUM CHLORIDE 100 ML/HR: 9 INJECTION, SOLUTION INTRAVENOUS at 00:16

## 2022-01-03 RX ADMIN — PANTOPRAZOLE SODIUM 40 MG: 40 INJECTION, POWDER, FOR SOLUTION INTRAVENOUS at 09:20

## 2022-01-03 RX ADMIN — GABAPENTIN 300 MG: 300 CAPSULE ORAL at 09:21

## 2022-01-03 RX ADMIN — METRONIDAZOLE 500 MG: 250 TABLET ORAL at 22:45

## 2022-01-03 RX ADMIN — HYDRALAZINE HYDROCHLORIDE 50 MG: 25 TABLET, FILM COATED ORAL at 09:21

## 2022-01-03 RX ADMIN — HYDRALAZINE HYDROCHLORIDE 75 MG: 25 TABLET, FILM COATED ORAL at 22:45

## 2022-01-03 RX ADMIN — PANTOPRAZOLE SODIUM 40 MG: 40 TABLET, DELAYED RELEASE ORAL at 22:49

## 2022-01-03 RX ADMIN — SODIUM CHLORIDE, PRESERVATIVE FREE 10 ML: 5 INJECTION INTRAVENOUS at 14:00

## 2022-01-03 RX ADMIN — GABAPENTIN 300 MG: 300 CAPSULE ORAL at 22:45

## 2022-01-03 RX ADMIN — MORPHINE SULFATE 2 MG: 2 INJECTION, SOLUTION INTRAMUSCULAR; INTRAVENOUS at 12:43

## 2022-01-03 NOTE — PROGRESS NOTES
8602  Bedside and Verbal shift change report given to Wally Leung, RN by Lucius Coon RN    . Report included the following information SBAR, Kardex, OR Summary, Intake/Output and MAR.     2945  Call received from [employer] stating Gastric Emptying study is not done on inpatients. Tech said she will make note in chart. Gastric study discontinued. Dr. María Albarado made aware. 1409  Diet advanced to Regular. 1530  Patient had bowel movement, however, stool went into commode instead of hat. Stool soft and brown - small. Patient aware that stool sample is still needed. 1945  Bedside and Verbal shift change report given to [address] [place] [address]  by Wally eLung, RN. Report included the following information SBAR, Kardex, OR Summary, Intake/Output and MAR.

## 2022-01-03 NOTE — DIABETES MGMT
Diabetes Patient/Family Education Record    Factors That May Influence Patients Ability to Learn or Comply with Recommendations   []   Language barrier    []   Cultural needs   [x]   Motivation    []   Cognitive limitation    []   Physical   []   Education    []   Physiological factors   []   Hearing/vision/speaking impairment   []   [employer] beliefs    []   Financial factors   []  Other:   []  No factors identified at this time. Person Instructed:   [x]   Patient   []   Family   []  Other     Preference for Learning:   [x]   Verbal   []   Written   []  Demonstration     Level of Comprehension & Competence:    []  Good                                      [] Fair                                     []  Poor                             [x]  Needs Reinforcement   []  Teach back completed    Education Component: Patient with HgbA1c outside target range for age and conditions. Limited education provided due to patient condition. Patient's wife is the primary manager of patient's diabetes self management habits however she was unavailable today. Written materials provided and will attempt to return for further education.    []  Medication management, including how to administer insulin (if appropriate) and potential medication interactions    []  Nutritional management - [] Obtained usual meal pattern   []   Basic carbohydrate counting  []  Plate method  []  Limit concentrated sweets and avoid sweetened beverages  []  Portion control  []    Avoid skipping meals   []  Exercise   []  Signs, symptoms, and treatment of hyperglycemia and hypoglycemia   [] Prevention, recognition and treatment of hyperglycemia and hypoglycemia   []  Importance of blood glucose monitoring  [x] Blood Glucose targets   []   Provided patient with blood glucose meter  []  Has glucometer and supplies at home   []  Instruction on use of the blood glucose meter and recommended monitoring schedule   [x]  Discuss the importance of HbA1C monitoring. Patients A1c is 9.8%.  This is equivalent to average glucose of 235 mg/dl for the past 2-3 months.   []  Sick day guidelines   []  Proper use and disposal of lancets, needles, syringes or insulin pens (if appropriate)   []  Potential long-term complications (retinopathy, kidney disease, neuropathy, foot care)   [] Information about whom to contact in case of emergency or for more information    []  Goal:  Patient/family will demonstrate understanding of Diabetes Self- Management Skills by: (date) __1/10_____  Plan for post-discharge education or self-management support:    [] Outpatient class schedule provided            [] Patient Declined    [] Scheduled for outpatient classes (date) _______    [] Written information provided  Verify: [] Prior to admission Diabetes medications    Does patient understand how diabetes medications work? ____________________________  Does patient have difficulty obtaining diabetes medications or testing supplies? _________________    Xenia Quezada RD  Glycemic Control Team  [phone]    Monday-Friday   9 am - 3 pm

## 2022-01-03 NOTE — PROGRESS NOTES
RENAL CONSULT PROGRESS NOTE   1/3/2022    Patient:  Tony Escalona  :  1975  Gender:  male  MRN #:  [id number]    Reason for Consult: Acute renal failure     Assessment: Tony Escalona is a 42y.o. year old male  42 y.o.  male who has hx of DM, HTN,Neuropathy admitted for colitis, reportedly had more than 10 episodes of watery diarrhea on  and h/o loose stool/diarrhoea for 1 yr and uncontrolled DM. Baseline serum creatinine on  was 0.94 mg/dl on admission it was 3.47 mg/dl and is 3.28 mg/dl on    Ct abdomen and pelvis does not have evidence of hydronephrosis and obstruction . He has decent urine output overnight . Hemodynamically stable without evidence of hypotension. No evidence of proteinuria and hematuria on urine analysis .    So based on presentation Acute renal failure most likely is  pre renal volume depletion due to watery diarrhoea vs toxin related ATN due to infection/colitis       SUBJECTIVE:   -He says he feels little better than yesterday , complaint of constipation and abdominal discomfort   Denied problems in urinating   No nausea and vomiting        PLAN:-   - Decent urine output overnight with slight rise in serum creatinine   - Urine electrolytes now is not consistent with pre re-renal. Considering poor intake would continue normal saline 75  cc/hour until tomorrow morning , close attention to BP   - Ensure that he does not retain urine  -- Intake and output  - Hold ACEI/ARBS and lasix  - Continue amlodipine for hypertension , agree to increase hydralazine to 75 mg TID for hypertension   - Avoid NSAIDS,.contrats and nephrotoxin  - Dose all meds and antibiotics for current eGFR  - No clinical and metabolic indication of RRT  - He has risk factors for CKD, optimum diabetic and BP management     Rest of the management per primary team       Intake/Output Summary (Last 24 hours) at 1/3/2022 1618  Last data filed at 1/3/2022 0921  Gross per 24 hour   Intake 1648.33 ml   Output 1700 ml   Net -51.67 ml         Objective:    Visit Vitals  BP (!) 157/88   Pulse (!) 105   Temp 99.1 °F (37.3 °C)   Resp 16   Ht 5' 6\" (1.676 m)   Wt 90.7 kg (200 lb)   SpO2 94%   BMI 32.28 kg/m²       Physical Exam:    Pt awake  HEENT:-  no neck swelling  Lung: clear to auscultation  Ext: no edema      Laboratory Data:    Lab Results   Component Value Date    BUN 37 (H) 01/03/2022    BUN 33 (H) 01/02/2022    BUN 33 (H) 01/01/2022     01/03/2022     01/02/2022     01/01/2022    CO2 20 (L) 01/03/2022    CO2 20 (L) 01/02/2022    CO2 23 01/01/2022     Lab Results   Component Value Date    WBC 12.5 01/02/2022    HGB 13.7 01/02/2022    HCT 40.5 01/02/2022     No components found for: CALCIUM, PHOSPHORUS, MAGNESIUM  No results found for: HDL  No results found for: SPECIMENTYP, TURBIDITY, UGLU    Imaging Reveiwed:    CT abdomen/pelvis 12/30/21: Mild proximal sigmoid colonic wall thickening and inflammatory changes  suggesting an infectious or inflammatory colitis.     > No evidence of an organized or drainable fluid collection.     > No gross free intraperitoneal perforation.     2.  No evidence of urolithiasis or obstructive uropathy.           Bertha Whitley MD   [facility] NE[facility]

## 2022-01-03 NOTE — PROGRESS NOTES
Chart reviewed, cm will cont to remain available for d/c planning, can be contacted at [facility]

## 2022-01-03 NOTE — PROGRESS NOTES
Hospitalist Progress Note-critical care note     Patient: Bong  MRN: [id number]  [facility]: [id number]    YOB: 1975  Age: 42 y.o. Sex: male    DOA: 12/30/2021 LOS:  LOS: 4 days            Chief complaint: colitis miladis , dm ,     Assessment/Plan         Hospital Problems  Date Reviewed: 12/30/2021          Codes Class Noted POA    * (Principal) Colitis ICD-10-CM: K52.9  ICD-9-CM: 558.9  12/30/2021 Unknown        Diarrhea ICD-10-CM: R19.7  ICD-9-CM: 787.91  12/30/2021 Unknown        MILADIS (acute kidney injury) ([email or web address] ICD-10-CM: N17.9  ICD-9-CM: 584.9  12/30/2021 Unknown        DM (diabetes mellitus) ([facility].) ICD-10-CM: E11.9  ICD-9-CM: 250.00  12/30/2021 Unknown        Nausea & vomiting ICD-10-CM: R11.2  ICD-9-CM: 787.01  12/30/2021 Unknown        Diabetes mellitus ([facility].) ICD-10-CM: E11.9  ICD-9-CM: 250.00  12/30/2021 Unknown        Generalized abdominal pain ICD-10-CM: R10.84  ICD-9-CM: 789.07  12/30/2021 Unknown        Acute kidney injury [facility]) ICD-10-CM: N17.9  ICD-9-CM: 584.9  12/30/2021 Unknown               Intractable nausea/ vomiting  Resolved, no n/v   Will advance the diet   Suspect gastroparesis as etiology- gastric emptying test is not available in-pt setting   Change reglan to prn      Abdominal pain. No pain now   Like due to colitis        Colitis   No diarrhea   Continue tx      MILADIS due to dehydration  slowly improving, nephrology following  Avoid nsaids and iv contrast     DM2- controlled per lantus and ssi      HTN- uncontrolled  On norvasc ,increase hydralazine to 75 mg     Grade II diastolic dysfunction- compensated     Subjective: no n/v, no abdomen pain   rn : emptying test can not be done     Disposition :tbd,   Review of systems:    General: No fevers or chills. Cardiovascular: No chest pain or pressure. No palpitations. Pulmonary: No shortness of breath. Gastrointestinal: No nausea, vomiting.      Vital signs/Intake and Output:  Visit Vitals  BP (!) 165/99   Pulse 95   Temp 98.3 °F (36.8 °C)   Resp 18   Ht 5' 6\" (1.676 m)   Wt 90.7 kg (200 lb)   SpO2 94%   BMI 32.28 kg/m²     Current Shift:  No intake/output data recorded. Last three shifts:  01/01 1901 - 01/03 0700  In: 600 [P.O.:600]  Out: 3650 [Urine:3650]    Physical Exam:  General: WD, WN. Alert, cooperative, no acute distress    HEENT: NC, Atraumatic. PERRLA, anicteric sclerae. Lungs: CTA Bilaterally. No Wheezing/Rhonchi/Rales. Heart:  Regular  rhythm,  No murmur, No Rubs, No Gallops  Abdomen: Soft, Non distended, Non tender. +Bowel sounds,   Extremities: No c/c/e  Psych:   Not anxious or agitated. Neurologic:  No acute neurological deficit. Labs: Results:       Chemistry Recent Labs     01/02/22  0445 01/01/22  0400   * 163*    143   K 4.3 4.3    113*   CO2 20* 23   BUN 33* 33*   CREA 3.15* 3.28*   CA 8.8 8.6   AGAP 9 7   BUCR 10* 10*   AP 88 88   TP 6.5 6.3*   ALB 3.1* 2.9*   GLOB 3.4 3.4   AGRAT 0.9 0.9      CBC w/Diff Recent Labs     01/02/22  0445 01/01/22  0400   WBC 12.5 11.4   RBC 5.17 4.89   HGB 13.7 12.7*   HCT 40.5 38.8    288   GRANS 71 67   LYMPH 17* 19*   EOS 2 2      Cardiac Enzymes Recent Labs     01/01/22  0400         Coagulation No results for input(s): PTP, INR, APTT, INREXT in the last 72 hours. Lipid Panel No results found for: CHOL, CHOLPOCT, CHOLX, CHLST, CHOLV, 849969, HDL, HDLP, LDL, LDLC, DLDLP, 583518, VLDLC, VLDL, TGLX, TRIGL, TRIGP, TGLPOCT, CHHD, CHHDX   BNP No results for input(s): BNPP in the last 72 hours.    Liver Enzymes Recent Labs     01/02/22  0445   TP 6.5   ALB 3.1*   AP 88      Thyroid Studies No results found for: T4, T3U, TSH, TSHEXT     Procedures/imaging: see electronic medical records for all procedures/Xrays and details which were not copied into this note but were reviewed prior to creation of Plan    XR CHEST PA LAT    Result Date: 12/30/2021  EXAM: XR CHEST PA LAT CLINICAL INDICATION/HISTORY: Chest pain -Additional: None COMPARISON: None TECHNIQUE: PA and lateral views of the chest _______________ FINDINGS: HEART AND MEDIASTINUM: Cardiac size and mediastinal contours are within normal limits LUNGS AND PLEURAL SPACES: No focal pneumonic consolidation, pneumothorax or pleural effusion BONY THORAX AND SOFT TISSUES: No acute osseous abnormality _______________     No acute findings in the chest.    CT ABD PELV WO CONT    Result Date: 12/30/2021  EXAM: CT of the abdomen and pelvis INDICATION: Epigastric abdominal pain, chest pain COMPARISON: None. TECHNIQUE: Axial CT imaging of the abdomen and pelvis was performed without intravenous contrast. Multiplanar reformats were generated. One or more dose reduction techniques were used on this CT: automated exposure control, adjustment of the mAs and/or kVp according to patient size, and iterative reconstruction techniques. The specific techniques used on this CT exam have been documented in the patient's electronic medical record. Digital Imaging and Communications in Medicine (DICOM) format image data are available to nonaffiliated external healthcare facilities or entities on a secure, media free, reciprocally searchable basis with patient authorization for at least a 12-month period after this study. _______________ FINDINGS: LOWER CHEST: Streaky areas of atelectasis without evidence of consolidation or pleural effusion. Normal cardiac size. No pericardial effusion. LIVER, BILIARY: Liver is normal. No biliary dilation. Gallbladder is unremarkable. PANCREAS: Normal unenhanced appearance. SPLEEN: Normal. ADRENALS: Normal. KIDNEYS/URETERS/BLADDER: No evidence of hydronephrosis involving either kidney. No nephrolithiasis. Urinary bladder unremarkable. PELVIC ORGANS: Unremarkable. VASCULATURE: Unremarkable LYMPH NODES: No enlarged lymph nodes.  GASTROINTESTINAL TRACT: There is mild wall thickening involving the proximal portion of the sigmoid colon with accompanying right colonic stranding and edema. No evidence of an organized or drainable pericolonic fluid collection. No gross free intraperitoneal perforation. Normal appendix. BONES: No acute or aggressive osseous abnormalities identified. OTHER: Rectus diastases involving the anterior abdominal wall _______________     1. Mild proximal sigmoid colonic wall thickening and inflammatory changes suggesting an infectious or inflammatory colitis.   > No evidence of an organized or drainable fluid collection.   > No gross free intraperitoneal perforation. 2. No evidence of urolithiasis or obstructive uropathy. ECHO ADULT COMPLETE    Result Date: 1/1/2022    Left Ventricle: Left ventricle size is normal. Mildly increased wall thickness. Normal wall motion. Normal left ventricular systolic function with a visually estimated EF of 60 - 65%. Grade II diastolic dysfunction with increased LAP.   Mitral Valve: Mildly thickened leaflets. Mild transvalvular regurgitation. No stenosis.   Tricuspid Valve: Tricuspid regurgitation is inadequate for estimation of right ventricular systolic pressure.   IVC/SVC: IVC diameter is less than or equal to 21 mm and decreases greater than 50% during inspiration; therefore the estimated right atrial pressure is normal (~3 mmHg).        Soy Bowser MD

## 2022-01-03 NOTE — PROGRESS NOTES
Pharmacy Dosing Services: IV to PO Conversion    This patient meets [facility] P [facility] T approved criteria for conversion from IV to oral therapy for the following medication:  Pantoprazole    Pt has regular diet ordered and is taking other medications orally. Order adjusted to: Pantoprazole 40 mg po q12h    (prior order Pantoprazole 40 mg IV q12h)     Pharmacy will continue to monitor the patient's status daily.   Signed ISABELLE Burr Contact information:   [phone]

## 2022-01-03 NOTE — PROGRESS NOTES
Problem: Risk for Spread of Infection  Goal: Prevent transmission of infectious organism to others  Description: Prevent the transmission of infectious organisms to other patients, staff members, and visitors. Outcome: Progressing Towards Goal     Problem: Diabetes Self-Management  Goal: *Disease process and treatment process  Description: Define diabetes and identify own type of diabetes; list 3 options for treating diabetes. Outcome: Progressing Towards Goal     Problem: Falls - Risk of  Goal: *Absence of Falls  Description: Document Baldemar Oris Fall Risk and appropriate interventions in the flowsheet.   Outcome: Progressing Towards Goal  Note: Fall Risk Interventions:  Mobility Interventions: Communicate number of staff needed for ambulation/transfer,Patient to call before getting OOB         Medication Interventions: Evaluate medications/consider consulting pharmacy,Patient to call before getting OOB,Teach patient to arise slowly    Elimination Interventions: Call light in reach,Urinal in reach,Toileting schedule/hourly rounds,Patient to call for help with toileting needs    History of Falls Interventions: Evaluate medications/consider consulting pharmacy,Room close to nurse's station,Door open when patient unattended

## 2022-01-03 NOTE — ROUTINE PROCESS
Bedside and Verbal shift change report given to Ashli Bañuelos RN (oncoming nurse) by TODD Monk RN (offgoing nurse). Report included the following information SBAR, Kardex, ED Summary, Intake/Output, MAR and Recent Results.

## 2022-01-03 NOTE — PROGRESS NOTES
Gastric Emptying studies are not performed on an inpatient basis. Patient needs to be outpatient and back to baseline before test can be performed. Contacted nurse to inform and find out who attending physician was but she was unaware who that was at this time. Cancelling order in [facility]. Please reorder when patient is outpatient and back to normal baseline status.

## 2022-01-03 NOTE — DIABETES MGMT
Diabetes/ Glycemic Control Plan of Care  Recommendations:   Continue current IP regimen of 20 units Lantus and Humalog ACHS   Plan to continue providing education due to A1c 9.8%    Assessment: Patient with known history of diabetes admitted for abdominal pain. Hgba1c outside of target range for age and conditions. IP glucose well controlled on current regimen of 20 units Lantus and Humalog achs. POC gluocse ranging 128-150 over the last 24 hours. DX:   1. Acute kidney injury ([address]     2. Diffuse abdominal pain     3. Colitis     4. Type 2 diabetes mellitus with hyperglycemia, with long-term current use of insulin ([facility])        Recent Glucose Results:   Lab Results   Component Value Date/Time     (H) 01/03/2022 02:25 AM    GLUCPOC 150 (H) 01/03/2022 11:54 AM    GLUCPOC 136 (H) 01/03/2022 09:10 AM    GLUCPOC 143 (H) 01/02/2022 09:45 PM           Within target range (70-180mg/dL): Yes  Current insulin orders: 20 units Lantus, Humalog ACHS - normal corrective scale  Total Daily Dose previous 24 hours =  22 units (20 units Lantus +  2 units Humalog)  Current A1c:   Lab Results   Component Value Date/Time    Hemoglobin A1c 9.8 (H) 12/30/2021 11:35 AM      equivalent  to ave Blood Glucose of 235 mg/dl for 2-3 months prior to admission  Adequate glycemic control PTA: No  Nutrition/Diet:   Active Orders   Diet    ADULT DIET Full Liquid     Home diabetes medications:   Key Antihyperglycemic Medications             insulin lispro (HumaLOG U-100 Insulin) 100 unit/mL injection (Taking) by SubCUTAneous route two (2) times a day. Pt unsure of exact insulin he takes at home. metFORMIN (GLUCOPHAGE) 1,000 mg tablet Take 1,000 mg by mouth two (2) times daily (with meals). Plan/Goals:   Blood glucose will be within target of 70 - 180 mg/dl within 72 hours  Reinforce dietary and medication compliance at home.          Education:  [x] Refer to Diabetes Education Record                       [] Education not indicated at this time     Lorna Hawkins RD  Glycemic Control Team  [phone]    Monday-Friday   9 am - 3 pm

## 2022-01-04 ENCOUNTER — APPOINTMENT (OUTPATIENT)
Dept: GENERAL RADIOLOGY | Age: 47
DRG: 073 | End: 2022-01-04
Attending: HOSPITALIST
Payer: COMMERCIAL

## 2022-01-04 ENCOUNTER — APPOINTMENT (OUTPATIENT)
Dept: ULTRASOUND IMAGING | Age: 47
DRG: 073 | End: 2022-01-04
Attending: HOSPITALIST
Payer: COMMERCIAL

## 2022-01-04 LAB
ANION GAP SERPL CALC-SCNC: 13 MMOL/L (ref 3–18)
BUN SERPL-MCNC: 39 MG/DL (ref 7–18)
BUN/CREAT SERPL: 12 (ref 12–20)
CALCIUM SERPL-MCNC: 8.2 MG/DL (ref 8.5–10.1)
CHLORIDE SERPL-SCNC: 107 MMOL/L (ref 100–111)
CO2 SERPL-SCNC: 17 MMOL/L (ref 21–32)
COVID-19 RAPID TEST, COVR: NOT DETECTED
CREAT SERPL-MCNC: 3.13 MG/DL (ref 0.6–1.3)
GLUCOSE BLD STRIP.AUTO-MCNC: 119 MG/DL (ref 70–110)
GLUCOSE BLD STRIP.AUTO-MCNC: 120 MG/DL (ref 70–110)
GLUCOSE BLD STRIP.AUTO-MCNC: 126 MG/DL (ref 70–110)
GLUCOSE SERPL-MCNC: 114 MG/DL (ref 74–99)
MAGNESIUM SERPL-MCNC: 2 MG/DL (ref 1.6–2.6)
POTASSIUM SERPL-SCNC: 4 MMOL/L (ref 3.5–5.5)
SODIUM SERPL-SCNC: 137 MMOL/L (ref 136–145)
SOURCE, COVRS: NORMAL
TROPONIN-HIGH SENSITIVITY: 19 NG/L (ref 0–78)
TROPONIN-HIGH SENSITIVITY: 23 NG/L (ref 0–78)
TROPONIN-HIGH SENSITIVITY: 25 NG/L (ref 0–78)

## 2022-01-04 PROCEDURE — 80048 BASIC METABOLIC PNL TOTAL CA: CPT

## 2022-01-04 PROCEDURE — 74018 RADEX ABDOMEN 1 VIEW: CPT

## 2022-01-04 PROCEDURE — 65270000029 HC RM PRIVATE

## 2022-01-04 PROCEDURE — 74011250636 HC RX REV CODE- 250/636: Performed by: HOSPITALIST

## 2022-01-04 PROCEDURE — 74011250637 HC RX REV CODE- 250/637: Performed by: HOSPITALIST

## 2022-01-04 PROCEDURE — 74011250637 HC RX REV CODE- 250/637: Performed by: INTERNAL MEDICINE

## 2022-01-04 PROCEDURE — 93005 ELECTROCARDIOGRAM TRACING: CPT

## 2022-01-04 PROCEDURE — 74011000250 HC RX REV CODE- 250: Performed by: INTERNAL MEDICINE

## 2022-01-04 PROCEDURE — 83735 ASSAY OF MAGNESIUM: CPT

## 2022-01-04 PROCEDURE — 74011250636 HC RX REV CODE- 250/636: Performed by: STUDENT IN AN ORGANIZED HEALTH CARE EDUCATION/TRAINING PROGRAM

## 2022-01-04 PROCEDURE — 36415 COLL VENOUS BLD VENIPUNCTURE: CPT

## 2022-01-04 PROCEDURE — 84484 ASSAY OF TROPONIN QUANT: CPT

## 2022-01-04 PROCEDURE — 74011250636 HC RX REV CODE- 250/636: Performed by: INTERNAL MEDICINE

## 2022-01-04 PROCEDURE — 87635 SARS-COV-2 COVID-19 AMP PRB: CPT

## 2022-01-04 PROCEDURE — 82962 GLUCOSE BLOOD TEST: CPT

## 2022-01-04 PROCEDURE — 74011250637 HC RX REV CODE- 250/637: Performed by: STUDENT IN AN ORGANIZED HEALTH CARE EDUCATION/TRAINING PROGRAM

## 2022-01-04 PROCEDURE — 76770 US EXAM ABDO BACK WALL COMP: CPT

## 2022-01-04 RX ORDER — SODIUM BICARBONATE 650 MG/1
1300 TABLET ORAL 2 TIMES DAILY
Status: DISCONTINUED | OUTPATIENT
Start: 2022-01-04 | End: 2022-01-10

## 2022-01-04 RX ORDER — METOCLOPRAMIDE HYDROCHLORIDE 5 MG/ML
5 INJECTION INTRAMUSCULAR; INTRAVENOUS EVERY 6 HOURS
Status: DISCONTINUED | OUTPATIENT
Start: 2022-01-04 | End: 2022-01-05

## 2022-01-04 RX ADMIN — HYDRALAZINE HYDROCHLORIDE 75 MG: 25 TABLET, FILM COATED ORAL at 22:40

## 2022-01-04 RX ADMIN — HYDRALAZINE HYDROCHLORIDE 75 MG: 25 TABLET, FILM COATED ORAL at 16:28

## 2022-01-04 RX ADMIN — SODIUM BICARBONATE 1300 MG: 650 TABLET ORAL at 22:30

## 2022-01-04 RX ADMIN — METOCLOPRAMIDE HYDROCHLORIDE 5 MG: 5 INJECTION INTRAMUSCULAR; INTRAVENOUS at 10:24

## 2022-01-04 RX ADMIN — SODIUM BICARBONATE 1300 MG: 650 TABLET ORAL at 10:33

## 2022-01-04 RX ADMIN — METOCLOPRAMIDE HYDROCHLORIDE 5 MG: 5 INJECTION INTRAMUSCULAR; INTRAVENOUS at 02:33

## 2022-01-04 RX ADMIN — METRONIDAZOLE 500 MG: 250 TABLET ORAL at 16:28

## 2022-01-04 RX ADMIN — METRONIDAZOLE 500 MG: 250 TABLET ORAL at 22:30

## 2022-01-04 RX ADMIN — GABAPENTIN 300 MG: 300 CAPSULE ORAL at 22:30

## 2022-01-04 RX ADMIN — MORPHINE SULFATE 2 MG: 2 INJECTION, SOLUTION INTRAMUSCULAR; INTRAVENOUS at 22:31

## 2022-01-04 RX ADMIN — CIPROFLOXACIN 250 MG: 250 TABLET ORAL at 07:24

## 2022-01-04 RX ADMIN — METOCLOPRAMIDE HYDROCHLORIDE 5 MG: 5 INJECTION INTRAMUSCULAR; INTRAVENOUS at 22:31

## 2022-01-04 RX ADMIN — SODIUM CHLORIDE 75 ML/HR: 9 INJECTION, SOLUTION INTRAVENOUS at 22:43

## 2022-01-04 RX ADMIN — PANTOPRAZOLE SODIUM 40 MG: 40 TABLET, DELAYED RELEASE ORAL at 22:30

## 2022-01-04 RX ADMIN — HYDRALAZINE HYDROCHLORIDE 75 MG: 25 TABLET, FILM COATED ORAL at 10:33

## 2022-01-04 RX ADMIN — MORPHINE SULFATE 2 MG: 2 INJECTION, SOLUTION INTRAMUSCULAR; INTRAVENOUS at 16:29

## 2022-01-04 RX ADMIN — METRONIDAZOLE 500 MG: 250 TABLET ORAL at 10:33

## 2022-01-04 RX ADMIN — SODIUM CHLORIDE, PRESERVATIVE FREE 10 ML: 5 INJECTION INTRAVENOUS at 22:32

## 2022-01-04 RX ADMIN — GABAPENTIN 300 MG: 300 CAPSULE ORAL at 10:33

## 2022-01-04 RX ADMIN — METOCLOPRAMIDE HYDROCHLORIDE 5 MG: 5 INJECTION INTRAMUSCULAR; INTRAVENOUS at 16:28

## 2022-01-04 RX ADMIN — MORPHINE SULFATE 2 MG: 2 INJECTION, SOLUTION INTRAMUSCULAR; INTRAVENOUS at 10:24

## 2022-01-04 RX ADMIN — MORPHINE SULFATE 2 MG: 2 INJECTION, SOLUTION INTRAMUSCULAR; INTRAVENOUS at 02:32

## 2022-01-04 RX ADMIN — ENOXAPARIN SODIUM 30 MG: 100 INJECTION SUBCUTANEOUS at 10:29

## 2022-01-04 RX ADMIN — AMLODIPINE BESYLATE 10 MG: 5 TABLET ORAL at 10:33

## 2022-01-04 RX ADMIN — CIPROFLOXACIN 250 MG: 250 TABLET ORAL at 18:58

## 2022-01-04 RX ADMIN — PANTOPRAZOLE SODIUM 40 MG: 40 TABLET, DELAYED RELEASE ORAL at 10:33

## 2022-01-04 NOTE — PROGRESS NOTES
RENAL CONSULT PROGRESS NOTE   2022    Patient:  Michele Mendes  :  1975  Gender:  male  MRN #:  [id number]    Reason for Consult: Acute renal failure     Assessment: Michele Mendes is a 42y.o. year old male with h/o  DM, HTN,Neuropathy admitted for colitis, reportedly had more than 10 episodes of watery diarrhea on  and h/o loose stool/diarrhoea for 1 yr and uncontrolled DM. Baseline serum creatinine on  was 0.94 mg/dl on admission it was 3.47 mg/dl and is 3.28 mg/dl on    Ct abdomen and pelvis does not have evidence of hydronephrosis and obstruction . He has decent urine output overnight . Hemodynamically stable without evidence of hypotension. No evidence of proteinuria and hematuria on urine analysis . So based on presentation Acute renal failure most likely is  pre renal volume depletion due to watery diarrhoea vs toxin related ATN due to infection/colitis       SUBJECTIVE:   -says he does not have diarrhea anymore . This morning he was complaining of abdominal pain and chest pain   Denied problems in urinating   No nausea and vomiting        PLAN:-   - Decent urine output overnight, creatinine is still elevated but renal function stable as compared to yesterday .    - Urine electrolytes now is not consistent with pre re-renal. Considering poor intake would continue normal saline 75  cc/hour until tomorrow morning , close attention to BP   - Ensure that he does not retain urine  -- Intake and output  - Hold ACEI/ARBS and lasix  - Continue amlodipine for hypertension , continue hydralazine to 75 mg TID for hypertension   - Avoid NSAIDS,.contrats and nephrotoxin  - Dose all meds and antibiotics for current eGFR  - No clinical and metabolic indication of RRT  - He has risk factors for CKD, optimum diabetic and BP management   - discussed with Dr Darshan Post , hospitalist about his abdominal/chest pain , work up per primary team   - will start sodium bicarbonate 1300 mg BID for metabolic acidosis    Rest of the management per primary team       Intake/Output Summary (Last 24 hours) at 1/4/2022 1016  Last data filed at 1/4/2022 0630  Gross per 24 hour   Intake 2781.33 ml   Output 1400 ml   Net 1381.33 ml         Objective:    Visit Vitals  BP (!) 155/86 (BP 1 Location: Right upper arm, BP Patient Position: Lying left side)   Pulse 97   Temp 98.6 °F (37 °C)   Resp 17   Ht 5' 6\" (1.676 m)   Wt 90.7 kg (200 lb)   SpO2 99%   BMI 32.28 kg/m²       Physical Exam:    Pt awake  HEENT:-  no neck swelling  Lung: clear to auscultation  Ext: no edema  abdomen is non tender       Laboratory Data:    Lab Results   Component Value Date    BUN 39 (H) 01/04/2022    BUN 37 (H) 01/03/2022    BUN 33 (H) 01/02/2022     01/04/2022     01/03/2022     01/02/2022    CO2 17 (L) 01/04/2022    CO2 20 (L) 01/03/2022    CO2 20 (L) 01/02/2022     Lab Results   Component Value Date    WBC 12.5 01/02/2022    HGB 13.7 01/02/2022    HCT 40.5 01/02/2022     No components found for: CALCIUM, PHOSPHORUS, MAGNESIUM  No results found for: HDL  No results found for: SPECIMENTYP, TURBIDITY, UGLU    Imaging Reveiwed:    CT abdomen/pelvis 12/30/21: Mild proximal sigmoid colonic wall thickening and inflammatory changes  suggesting an infectious or inflammatory colitis.     > No evidence of an organized or drainable fluid collection.     > No gross free intraperitoneal perforation.     2.  No evidence of urolithiasis or obstructive uropathy.           Chidi James MD   [facility]- NEPHROLOGY

## 2022-01-04 NOTE — PROGRESS NOTES
Problem: Risk for Spread of Infection  Goal: Prevent transmission of infectious organism to others  Description: Prevent the transmission of infectious organisms to other patients, staff members, and visitors. Outcome: Progressing Towards Goal     Problem: Patient Education:  Go to Education Activity  Goal: Patient/Family Education  Outcome: Progressing Towards Goal     Problem: Diabetes Self-Management  Goal: *Disease process and treatment process  Description: Define diabetes and identify own type of diabetes; list 3 options for treating diabetes. Outcome: Progressing Towards Goal  Goal: *Incorporating nutritional management into lifestyle  Description: Describe effect of type, amount and timing of food on blood glucose; list 3 methods for planning meals. Outcome: Progressing Towards Goal  Goal: *Incorporating physical activity into lifestyle  Description: State effect of exercise on blood glucose levels. Outcome: Progressing Towards Goal  Goal: *Developing strategies to promote health/change behavior  Description: Define the ABC's of diabetes; identify appropriate screenings, schedule and personal plan for screenings. Outcome: Progressing Towards Goal  Goal: *Using medications safely  Description: State effect of diabetes medications on diabetes; name diabetes medication taking, action and side effects. Outcome: Progressing Towards Goal  Goal: *Monitoring blood glucose, interpreting and using results  Description: Identify recommended blood glucose targets  and personal targets. Outcome: Progressing Towards Goal  Goal: *Prevention, detection, treatment of acute complications  Description: List symptoms of hyper- and hypoglycemia; describe how to treat low blood sugar and actions for lowering  high blood glucose level.   Outcome: Progressing Towards Goal  Goal: *Prevention, detection and treatment of chronic complications  Description: Define the natural course of diabetes and describe the relationship of blood glucose levels to long term complications of diabetes. Outcome: Progressing Towards Goal  Goal: *Developing strategies to address psychosocial issues  Description: Describe feelings about living with diabetes; identify support needed and support network  Outcome: Progressing Towards Goal  Goal: *Insulin pump training  Outcome: Progressing Towards Goal  Goal: *Sick day guidelines  Outcome: Progressing Towards Goal  Goal: *Patient Specific Goal (EDIT GOAL, INSERT TEXT)  Outcome: Progressing Towards Goal     Problem: Patient Education: Go to Patient Education Activity  Goal: Patient/Family Education  Outcome: Progressing Towards Goal     Problem: Falls - Risk of  Goal: *Absence of Falls  Description: Document Joshua Fall Risk and appropriate interventions in the flowsheet.   Outcome: Progressing Towards Goal  Note: Fall Risk Interventions:  Mobility Interventions: Communicate number of staff needed for ambulation/transfer         Medication Interventions: Evaluate medications/consider consulting pharmacy    Elimination Interventions: Call light in reach    History of Falls Interventions: Door open when patient unattended         Problem: Patient Education: Go to Patient Education Activity  Goal: Patient/Family Education  Outcome: Progressing Towards Goal

## 2022-01-04 NOTE — PROGRESS NOTES
Hospitalist Progress Note-critical care note     Patient: Bong  MRN: [id number]  CSN: [id number]    YOB: 1975  Age: 42 y.o. Sex: male    DOA: 12/30/2021 LOS:  LOS: 5 days            Chief complaint: colitis miladis , dm ,     Assessment/Plan         Hospital Problems  Date Reviewed: 12/30/2021          Codes Class Noted POA    * (Principal) Colitis ICD-10-CM: K52.9  ICD-9-CM: 558.9  12/30/2021 Unknown        Diarrhea ICD-10-CM: R19.7  ICD-9-CM: 787.91  12/30/2021 Unknown        MILADIS (acute kidney injury) ([facility] ICD-10-CM: N17.9  ICD-9-CM: 584.9  12/30/2021 Unknown        DM (diabetes mellitus) ([facility] ICD-10-CM: E11.9  ICD-9-CM: 250.00  12/30/2021 Unknown        Nausea & vomiting ICD-10-CM: R11.2  ICD-9-CM: 787.01  12/30/2021 Unknown        Diabetes mellitus ([facility] ICD-10-CM: E11.9  ICD-9-CM: 250.00  12/30/2021 Unknown        Generalized abdominal pain ICD-10-CM: R10.84  ICD-9-CM: 789.07  12/30/2021 Unknown        Acute kidney injury [facility]) ICD-10-CM: N17.9  ICD-9-CM: 584.9  12/30/2021 Unknown               Intractable nausea/ vomiting-  N/v came back today   Change back to liquid diet   Suspect gastroparesis as etiology- gastric emptying test is not available in-pt setting   Change back  reglan scheduled   kub      Abdominal pain.    Like due to colitis    Will have kub       Colitis   No diarrhea   Continue tx   No diarrhea      MILADIS due to dehydration sill not improving   slowly improving, nephrology following  Avoid nsaids and iv contrast   Bicarb added per renal   Will have renal ultrasound     DM2- controlled per lantus and ssi      HTN- uncontrolled  On norvasc , on hydralazine to 75 mg     Grade II diastolic dysfunction- compensated     Chest pain   Stat ekg done,   Trop wnl    Will have stress test due to risk factors     Subjective: n/v back, the smell of food makes me n/v , pain for stomach to my chest     rn : emptying test can not be done     Will recheck covid 19, negative on dec 27    Talked with wife per phone  She is updated, case discussed with Dr. Papo Cameron   All questions have been answered. 35 total min's spent on patient care including >50% on counseling/coordinating care. Discussed the above assessments. also discussed labs, medications and hospital course      Disposition :tbd,   Review of systems:    General: No fevers or chills. Cardiovascular: No chest pain or pressure. No palpitations. Pulmonary: No shortness of breath. Gastrointestinal: No nausea, vomiting. Vital signs/Intake and Output:  Visit Vitals  BP (!) 161/89 (BP 1 Location: Right upper arm, BP Patient Position: At rest;Supine)   Pulse (!) 101   Temp 98.3 °F (36.8 °C)   Resp 17   Ht 5' 6\" (1.676 m)   Wt 90.7 kg (200 lb)   SpO2 98%   BMI 32.28 kg/m²     Current Shift:  01/04 0701 - 01/04 1900  In: -   Out: 1175 [CHLIV:7530]  Last three shifts:  01/02 1901 - 01/04 0700  In: 4189.7 [P.O.:1640; I.V.:2549.7]  Out: 3100 [Urine:2800]    Physical Exam:  General: WD, WN. Alert, cooperative, no acute distress    HEENT: NC, Atraumatic. PERRLA, anicteric sclerae. Lungs: CTA Bilaterally. No Wheezing/Rhonchi/Rales. Heart:  Regular  rhythm,  No murmur, No Rubs, No Gallops  Abdomen: Soft, Non distended, Non tender. +Bowel sounds,   Extremities: No c/c/e  Psych:   Not anxious or agitated. Neurologic:  No acute neurological deficit.              Labs: Results:       Chemistry Recent Labs     01/04/22  0240 01/03/22  0225 01/02/22  0445   * 128* 126*    139 139   K 4.0 4.3 4.3    109 110   CO2 17* 20* 20*   BUN 39* 37* 33*   CREA 3.13* 3.21* 3.15*   CA 8.2* 8.4* 8.8   AGAP 13 10 9   BUCR 12 12 10*   AP  --   --  88   TP  --   --  6.5   ALB  --   --  3.1*   GLOB  --   --  3.4   AGRAT  --   --  0.9      CBC w/Diff Recent Labs     01/02/22  0445   WBC 12.5   RBC 5.17   HGB 13.7   HCT 40.5      GRANS 71   LYMPH 17*   EOS 2      Cardiac Enzymes No results for input(s): CPK, CKND1, PARTH in the last 72 hours.    No lab exists for component: CKRMB, TROIP   Coagulation No results for input(s): PTP, INR, APTT, INREXT, INREXT in the last 72 hours. Lipid Panel No results found for: CHOL, CHOLPOCT, CHOLX, CHLST, CHOLV, 770566, HDL, HDLP, LDL, LDLC, DLDLP, 543183, VLDLC, VLDL, TGLX, TRIGL, TRIGP, TGLPOCT, CHHD, CHHDX   BNP No results for input(s): BNPP in the last 72 hours. Liver Enzymes Recent Labs     01/02/22  0445   TP 6.5   ALB 3.1*   AP 88      Thyroid Studies No results found for: T4, T3U, TSH, TSHEXT, TSHEXT     Procedures/imaging: see electronic medical records for all procedures/Xrays and details which were not copied into this note but were reviewed prior to creation of Plan    XR CHEST PA LAT    Result Date: 12/30/2021  EXAM: XR CHEST PA LAT CLINICAL INDICATION/HISTORY: Chest pain -Additional: None COMPARISON: None TECHNIQUE: PA and lateral views of the chest _______________ FINDINGS: HEART AND MEDIASTINUM: Cardiac size and mediastinal contours are within normal limits LUNGS AND PLEURAL SPACES: No focal pneumonic consolidation, pneumothorax or pleural effusion BONY THORAX AND SOFT TISSUES: No acute osseous abnormality _______________     No acute findings in the chest.    CT ABD PELV WO CONT    Result Date: 12/30/2021  EXAM: CT of the abdomen and pelvis INDICATION: Epigastric abdominal pain, chest pain COMPARISON: None. TECHNIQUE: Axial CT imaging of the abdomen and pelvis was performed without intravenous contrast. Multiplanar reformats were generated. One or more dose reduction techniques were used on this CT: automated exposure control, adjustment of the mAs and/or kVp according to patient size, and iterative reconstruction techniques. The specific techniques used on this CT exam have been documented in the patient's electronic medical record.   Digital Imaging and Communications in Medicine (DICOM) format image data are available to nonaffiliated external healthcare facilities or entities on a secure, media free, reciprocally searchable basis with patient authorization for at least a 12-month period after this study. _______________ FINDINGS: LOWER CHEST: Streaky areas of atelectasis without evidence of consolidation or pleural effusion. Normal cardiac size. No pericardial effusion. LIVER, BILIARY: Liver is normal. No biliary dilation. Gallbladder is unremarkable. PANCREAS: Normal unenhanced appearance. SPLEEN: Normal. ADRENALS: Normal. KIDNEYS/URETERS/BLADDER: No evidence of hydronephrosis involving either kidney. No nephrolithiasis. Urinary bladder unremarkable. PELVIC ORGANS: Unremarkable. VASCULATURE: Unremarkable LYMPH NODES: No enlarged lymph nodes. GASTROINTESTINAL TRACT: There is mild wall thickening involving the proximal portion of the sigmoid colon with accompanying right colonic stranding and edema. No evidence of an organized or drainable pericolonic fluid collection. No gross free intraperitoneal perforation. Normal appendix. BONES: No acute or aggressive osseous abnormalities identified. OTHER: Rectus diastases involving the anterior abdominal wall _______________     1. Mild proximal sigmoid colonic wall thickening and inflammatory changes suggesting an infectious or inflammatory colitis.   > No evidence of an organized or drainable fluid collection.   > No gross free intraperitoneal perforation. 2. No evidence of urolithiasis or obstructive uropathy. ECHO ADULT COMPLETE    Result Date: 1/1/2022    Left Ventricle: Left ventricle size is normal. Mildly increased wall thickness. Normal wall motion. Normal left ventricular systolic function with a visually estimated EF of 60 - 65%. Grade II diastolic dysfunction with increased LAP.   Mitral Valve: Mildly thickened leaflets. Mild transvalvular regurgitation. No stenosis.   Tricuspid Valve: Tricuspid regurgitation is inadequate for estimation of right ventricular systolic pressure.    IVC/SVC: IVC diameter is less than or equal to 21 mm and decreases greater than 50% during inspiration; therefore the estimated right atrial pressure is normal (~3 mmHg).        Simi Odell MD

## 2022-01-04 NOTE — DIABETES MGMT
Diabetes Patient/Family Education Record    Factors That May Influence Patients Ability to Learn or Comply with Recommendations   []   Language barrier    []   Cultural needs   []   Motivation    []   Cognitive limitation    []   Physical   []   Education    []   Physiological factors   []   Hearing/vision/speaking impairment   []   Zoroastrianism beliefs    []   Financial factors   []  Other:   [x]  No factors identified at this time. Person Instructed:   []   Patient   [x]   Family - wife   []  Other     Preference for Learning:   [x]   Verbal   [x]   Written   []  Demonstration     Level of Comprehension & Competence:    [x]  Good                                      [] Fair                                     []  Poor                             []  Needs Reinforcement   [x]  Teach back completed    Education Component:   []  Medication management, including how to administer insulin (if appropriate) and potential medication interactions    [x]  Nutritional management - [x] Obtained usual meal pattern   []   Basic carbohydrate counting  [x]  Plate method  [x]  Limit concentrated sweets and avoid sweetened beverages  [x]  Portion control  [x]    Avoid skipping meals   [x]  Exercise   [x]  Signs, symptoms, and treatment of hyperglycemia and hypoglycemia   [x] Prevention, recognition and treatment of hyperglycemia and hypoglycemia   [x]  Importance of blood glucose monitoring  [x] Blood Glucose targets   []   Provided patient with blood glucose meter  [x]  Has glucometer and supplies at home   []  Instruction on use of the blood glucose meter and recommended monitoring schedule   [x]  Discuss the importance of HbA1C monitoring. Patients A1c is _9.8%__ %.  This is equivalent to average glucose of _235_ mg/dl for the past 2-3 months.   []  Sick day guidelines   []  Proper use and disposal of lancets, needles, syringes or insulin pens (if appropriate)   [x]  Potential long-term complications (retinopathy, kidney disease, neuropathy, foot care)   [] Information about whom to contact in case of emergency or for more information    [x]  Goal:  Patient/family will demonstrate understanding of Diabetes Self- Management Skills by: (date) _2/4/22______  Plan for post-discharge education or self-management support:    [] Outpatient class schedule provided            [] Patient Declined    [] Scheduled for outpatient classes (date) _______    [] Written information provided  Verify: [x] Prior to admission Diabetes medications  Basaglar - 10 units QHS, Glipizide BID before breakfast and dinner  Does patient understand how diabetes medications work? ______yes______________________  Does patient have difficulty obtaining diabetes medications or testing supplies? _____No____________    Vel Pichardo RN, BSN, [address]   Glycemic Control Team   Phone:  [phone]  Tues - Thurs 8:30 - 4:30

## 2022-01-04 NOTE — PROGRESS NOTES
Spoke with Patient's Nurse stating that the patient needs to be NPO after midnight tonight to do stress test in the morning.

## 2022-01-04 NOTE — PROGRESS NOTES
Pt is  and independent. Pt has confirmed his PCP. Please encourage ambulation as appropriate. Anticipate pt will transition home with physician follow up when medically stable. CM to cotninue to follow and assist.       Care Management Interventions  Mode of Transport at Discharge:  Other (see comment) (Family)  Transition of Care Consult (CM Consult): Discharge Planning  Health Maintenance Reviewed: Yes  Support Systems: Spouse/Significant Other  Confirm Follow Up Transport: Self  The Plan for Transition of Care is Related to the Following Treatment Goals : Home with physician follow up  Discharge Location  Discharge Placement: Home with family assistance

## 2022-01-04 NOTE — PROGRESS NOTES
Bedside and Verbal shift change report given to ADARSH Saldivar RN (oncoming nurse) by Luz Calero (offgoing nurse). Report included the following information SBAR, Kardex, Intake/Output and MAR.

## 2022-01-04 NOTE — ROUTINE PROCESS
Bedside shift change report given to Arden Middleton RN by Bernardo Reid RN. Report included the following information SBAR and Kardex.

## 2022-01-04 NOTE — PROGRESS NOTES
2011 - Head to toe assessment completed at this time. Pt denies CP and SOB. Pt denies pain at this time. 24G Left Hand IVF infusing as ordered. Call bell and personal items within reach. Will continue to monitor.

## 2022-01-05 ENCOUNTER — APPOINTMENT (OUTPATIENT)
Dept: NON INVASIVE DIAGNOSTICS | Age: 47
DRG: 073 | End: 2022-01-05
Attending: HOSPITALIST
Payer: COMMERCIAL

## 2022-01-05 ENCOUNTER — APPOINTMENT (OUTPATIENT)
Dept: NUCLEAR MEDICINE | Age: 47
DRG: 073 | End: 2022-01-05
Attending: HOSPITALIST
Payer: COMMERCIAL

## 2022-01-05 LAB
ANION GAP SERPL CALC-SCNC: 9 MMOL/L (ref 3–18)
BUN SERPL-MCNC: 40 MG/DL (ref 7–18)
BUN/CREAT SERPL: 13 (ref 12–20)
CALCIUM SERPL-MCNC: 8.5 MG/DL (ref 8.5–10.1)
CHLORIDE SERPL-SCNC: 110 MMOL/L (ref 100–111)
CO2 SERPL-SCNC: 20 MMOL/L (ref 21–32)
CREAT SERPL-MCNC: 3.16 MG/DL (ref 0.6–1.3)
GLUCOSE BLD STRIP.AUTO-MCNC: 116 MG/DL (ref 70–110)
GLUCOSE BLD STRIP.AUTO-MCNC: 129 MG/DL (ref 70–110)
GLUCOSE BLD STRIP.AUTO-MCNC: 138 MG/DL (ref 70–110)
GLUCOSE BLD STRIP.AUTO-MCNC: 226 MG/DL (ref 70–110)
GLUCOSE SERPL-MCNC: 114 MG/DL (ref 74–99)
MAGNESIUM SERPL-MCNC: 2.1 MG/DL (ref 1.6–2.6)
POTASSIUM SERPL-SCNC: 4 MMOL/L (ref 3.5–5.5)
SODIUM SERPL-SCNC: 139 MMOL/L (ref 136–145)
STRESS BASELINE HR: 103 BPM
STRESS BASELINE ST DEPRESSION: 0 MM
STRESS ESTIMATED WORKLOAD: 1 METS
STRESS EXERCISE DUR MIN: NORMAL
STRESS PEAK DIAS BP: 88 MMHG
STRESS PEAK SYS BP: 169 MMHG
STRESS PERCENT HR ACHIEVED: 66 %
STRESS POST PEAK HR: 116 BPM
STRESS RATE PRESSURE PRODUCT: NORMAL BPM*MMHG
STRESS ST DEPRESSION: 0 MM
STRESS TARGET HR: 176 BPM

## 2022-01-05 PROCEDURE — A9500 TC99M SESTAMIBI: HCPCS

## 2022-01-05 PROCEDURE — 83735 ASSAY OF MAGNESIUM: CPT

## 2022-01-05 PROCEDURE — 82962 GLUCOSE BLOOD TEST: CPT

## 2022-01-05 PROCEDURE — 74011250636 HC RX REV CODE- 250/636: Performed by: INTERNAL MEDICINE

## 2022-01-05 PROCEDURE — 36415 COLL VENOUS BLD VENIPUNCTURE: CPT

## 2022-01-05 PROCEDURE — 74011250636 HC RX REV CODE- 250/636

## 2022-01-05 PROCEDURE — 74011250637 HC RX REV CODE- 250/637: Performed by: HOSPITALIST

## 2022-01-05 PROCEDURE — 74011250637 HC RX REV CODE- 250/637: Performed by: INTERNAL MEDICINE

## 2022-01-05 PROCEDURE — 74011250637 HC RX REV CODE- 250/637: Performed by: STUDENT IN AN ORGANIZED HEALTH CARE EDUCATION/TRAINING PROGRAM

## 2022-01-05 PROCEDURE — 74011000250 HC RX REV CODE- 250: Performed by: INTERNAL MEDICINE

## 2022-01-05 PROCEDURE — 93017 CV STRESS TEST TRACING ONLY: CPT

## 2022-01-05 PROCEDURE — 74011250636 HC RX REV CODE- 250/636: Performed by: STUDENT IN AN ORGANIZED HEALTH CARE EDUCATION/TRAINING PROGRAM

## 2022-01-05 PROCEDURE — 65270000029 HC RM PRIVATE

## 2022-01-05 PROCEDURE — 80048 BASIC METABOLIC PNL TOTAL CA: CPT

## 2022-01-05 PROCEDURE — 74011250636 HC RX REV CODE- 250/636: Performed by: HOSPITALIST

## 2022-01-05 RX ORDER — CIPROFLOXACIN 500 MG/1
250 TABLET ORAL EVERY 12 HOURS
Status: DISCONTINUED | OUTPATIENT
Start: 2022-01-05 | End: 2022-01-06

## 2022-01-05 RX ORDER — METOCLOPRAMIDE HYDROCHLORIDE 5 MG/ML
2.5 INJECTION INTRAMUSCULAR; INTRAVENOUS
Status: DISCONTINUED | OUTPATIENT
Start: 2022-01-05 | End: 2022-01-10

## 2022-01-05 RX ADMIN — HYDRALAZINE HYDROCHLORIDE 75 MG: 25 TABLET, FILM COATED ORAL at 18:24

## 2022-01-05 RX ADMIN — AMLODIPINE BESYLATE 10 MG: 5 TABLET ORAL at 12:18

## 2022-01-05 RX ADMIN — ONDANSETRON 4 MG: 2 INJECTION INTRAMUSCULAR; INTRAVENOUS at 05:21

## 2022-01-05 RX ADMIN — METRONIDAZOLE 500 MG: 250 TABLET ORAL at 12:17

## 2022-01-05 RX ADMIN — MORPHINE SULFATE 2 MG: 2 INJECTION, SOLUTION INTRAMUSCULAR; INTRAVENOUS at 05:13

## 2022-01-05 RX ADMIN — HYDRALAZINE HYDROCHLORIDE 75 MG: 25 TABLET, FILM COATED ORAL at 12:18

## 2022-01-05 RX ADMIN — CIPROFLOXACIN 250 MG: 250 TABLET ORAL at 08:04

## 2022-01-05 RX ADMIN — CIPROFLOXACIN HYDROCHLORIDE 250 MG: 500 TABLET, FILM COATED ORAL at 20:29

## 2022-01-05 RX ADMIN — METOCLOPRAMIDE HYDROCHLORIDE 5 MG: 5 INJECTION INTRAMUSCULAR; INTRAVENOUS at 05:04

## 2022-01-05 RX ADMIN — PANTOPRAZOLE SODIUM 40 MG: 40 TABLET, DELAYED RELEASE ORAL at 20:30

## 2022-01-05 RX ADMIN — Medication 36 MILLICURIE: at 10:40

## 2022-01-05 RX ADMIN — SODIUM BICARBONATE 1300 MG: 650 TABLET ORAL at 20:30

## 2022-01-05 RX ADMIN — PANTOPRAZOLE SODIUM 40 MG: 40 TABLET, DELAYED RELEASE ORAL at 13:53

## 2022-01-05 RX ADMIN — METOCLOPRAMIDE HYDROCHLORIDE 5 MG: 5 INJECTION INTRAMUSCULAR; INTRAVENOUS at 13:37

## 2022-01-05 RX ADMIN — SODIUM CHLORIDE, PRESERVATIVE FREE 10 ML: 5 INJECTION INTRAVENOUS at 18:23

## 2022-01-05 RX ADMIN — SODIUM CHLORIDE 75 ML/HR: 9 INJECTION, SOLUTION INTRAVENOUS at 13:39

## 2022-01-05 RX ADMIN — Medication 11.7 MILLICURIE: at 08:45

## 2022-01-05 RX ADMIN — METRONIDAZOLE 500 MG: 250 TABLET ORAL at 18:24

## 2022-01-05 RX ADMIN — REGADENOSON 0.4 MG: 0.08 INJECTION, SOLUTION INTRAVENOUS at 10:40

## 2022-01-05 RX ADMIN — ENOXAPARIN SODIUM 30 MG: 100 INJECTION SUBCUTANEOUS at 12:15

## 2022-01-05 RX ADMIN — SODIUM BICARBONATE 1300 MG: 650 TABLET ORAL at 12:17

## 2022-01-05 RX ADMIN — METOCLOPRAMIDE HYDROCHLORIDE 2.5 MG: 5 INJECTION INTRAMUSCULAR; INTRAVENOUS at 18:25

## 2022-01-05 RX ADMIN — GABAPENTIN 300 MG: 300 CAPSULE ORAL at 12:18

## 2022-01-05 NOTE — PROGRESS NOTES
Bedside and Verbal shift change report given to TRENTON Angel RN (oncoming nurse) by Ting Acosta (offgoing nurse). Report included the following information SBAR, Kardex, Intake/Output and MAR.

## 2022-01-05 NOTE — ROUTINE PROCESS
.Bedside and Verbal shift change report given to Deandra Marr RN by Ernesto Yin RN. Report included the following information SBAR and Kardex.

## 2022-01-05 NOTE — PROGRESS NOTES
RENAL CONSULT PROGRESS NOTE   2022    Patient:  Kaushal Argueta  :  1975  Gender:  male  MRN #:  [id number]    Reason for Consult: Acute renal failure     Assessment: Kaushal Argueta is a 42y.o. year old male with h/o  DM, HTN,Neuropathy admitted for colitis, reportedly had more than 10 episodes of watery diarrhea on  and h/o loose stool/diarrhoea for 1 yr and uncontrolled DM. Baseline serum creatinine on  was 0.94 mg/dl on admission it was 3.47 mg/dl and is 3.28 mg/dl on    Ct abdomen and pelvis does not have evidence of hydronephrosis and obstruction . He has decent urine output overnight . Hemodynamically stable without evidence of hypotension. No evidence of proteinuria and hematuria on urine analysis . Does not have clinically significant proteinuria so less likely glomerular disease   So based on presentation Acute renal failure most likely is  pre renal volume depletion due to watery diarrhoea vs toxin related ATN due to infection/colitis       SUBJECTIVE:   -saw him after half part of  stress test , he says he still has abdominal discomfort and chest pain . Diarrhea improved . Denied problems in urinating   No nausea and vomiting        PLAN:-   - Since admission he looks sick, mostly complaints of abdominal pain and chest pain and poor appetite, has flat affect and does not communicate much . I doubt those manifestation id due to renal failure alone  Consider GI consult as well along with cardiology . Does not have hematuria and proteinuria , less likely glomerular process, still believe it is ATN due to severe diarrhea for long time before presentation, but other causes are not excluded , need to follow renal trajectory for lit bit before further work up     - Decent urine output overnight,Renal function is not available this morning .  Creatinine was down trending up until 22, will repeat BMP today   - Urine electrolytes now is not consistent with pre re-renal. Considering poor intake would continue normal saline 75  cc/hour until tomorrow morning , close attention to BP   - Ensure that he does not retain urine  -- Intake and output  - Hold ACEI/ARBS and lasix  - Continue amlodipine for hypertension , continue hydralazine to 75 mg TID for hypertension   - Avoid NSAIDS,.contrats and nephrotoxin  - Dose all meds and antibiotics for current eGFR  - No clinical and metabolic indication of RRT  - He has risk factors for CKD, optimum diabetic and BP management   - continue sodium bicarbonate 1300 mg BID for metabolic acidosis, most likely bicarb loss in GI. Stop once serum bicarb above 22 mmol/lt     Rest of the management per primary team       Intake/Output Summary (Last 24 hours) at 1/5/2022 0959  Last data filed at 1/5/2022 0700  Gross per 24 hour   Intake 1250 ml   Output 2825 ml   Net -1575 ml         Objective:    Visit Vitals  BP (!) 152/89   Pulse 100   Temp 97.9 °F (36.6 °C)   Resp 17   Ht 5' 6\" (1.676 m)   Wt 90.7 kg (200 lb)   SpO2 98%   BMI 32.28 kg/m²       Physical Exam:    Pt awake  HEENT:-  no neck swelling  Lung: clear to auscultation  Ext: no edema  abdomen is non tender       Laboratory Data:    Lab Results   Component Value Date    BUN 39 (H) 01/04/2022    BUN 37 (H) 01/03/2022    BUN 33 (H) 01/02/2022     01/04/2022     01/03/2022     01/02/2022    CO2 17 (L) 01/04/2022    CO2 20 (L) 01/03/2022    CO2 20 (L) 01/02/2022     Lab Results   Component Value Date    WBC 12.5 01/02/2022    HGB 13.7 01/02/2022    HCT 40.5 01/02/2022     No components found for: CALCIUM, PHOSPHORUS, MAGNESIUM  No results found for: HDL  No results found for: SPECIMENTYP, TURBIDITY, UGLU    Imaging Reveiwed:    CT abdomen/pelvis 12/30/21: Mild proximal sigmoid colonic wall thickening and inflammatory changes  suggesting an infectious or inflammatory colitis.     > No evidence of an organized or drainable fluid collection.     > No gross free intraperitoneal perforation.     2. No evidence of urolithiasis or obstructive uropathy.           Zeus Nichols MD   [facility] NE[facility]

## 2022-01-05 NOTE — DIABETES MGMT
Diabetes/ Glycemic Control Plan of Care  Recommendations:    Place Lantus on hold    Assessment:  Patient has been euglycemic x 2 days with no insulin. Recommend placing order to hold, as patient has not required basal insulin to meet fasting target. Expect blood sugars to elevate as diet advanced. May consider mealtime insulin if postprandial elevation occurs. Recent Glucose Results:   Lab Results   Component Value Date/Time     (H) 01/05/2022 02:59 AM    GLUCPOC 129 (H) 01/05/2022 11:39 AM    GLUCPOC 116 (H) 01/05/2022 07:34 AM    GLUCPOC 120 (H) 01/04/2022 09:21 PM        Within target range (70-180mg/dL): No  Current insulin orders:     Lantus 20 units every 24 hours   Corrective Humalog - normal sensitivity scale  Total Daily Dose previous 24 hours =  none     Plan/Goals:   Blood glucose will be within target of 70 - 180 mg/dl within 72 hours  Reinforce dietary and medication compliance at home.        Education:  [x] Refer to Diabetes Education Record                       [] Education not indicated at this time     Clyde Whitmore RN, BSN, [facility] [employer]  Professional   Glycemic Control Team   Phone:  [phone]  Tues - Thurs 8:30 - 4:30

## 2022-01-05 NOTE — PROGRESS NOTES
Physician Progress Note      Chiara John  CSN #:                  [id number]  :                       1975  ADMIT DATE:       2021 10:55 AM  DISCH DATE:  RESPONDING  PROVIDER #:        Yasmany ENRIQUEZ MD          QUERY TEXT:    Patient presented to the ER with N/V/D. Noted documentation of  Acute renal failure most likely is pre renal volume depletion due to watery diarrhea vs toxin related ATN due to infection/colitis on 1/3/22 by Nephrology consultant. If possible, please document in progress notes and discharge summary:    The medical record reflects the following:  Risk Factors: Dehydration; volume depletion; colitis; intractable N/V; gastroparesis  Clinical Indicators:  1/3/22 Nephrology note:  based on presentation Acute renal failure most likely is  pre renal volume depletion due to watery diarrhea vs toxin related ATN due to infection/colitis   Medical note: MILADIS due to dehydration sill not improving  Treatment: Nephrology consult; BMP; avoid NSAIDS/IV contrast; bicarb; renal US    Thank you,  Rome Jorgensen RN/CCDS  [phone]  Options provided:  -- Acute renal failure due to volume depletion vs toxin related ATN confirmed present on admission  -- Defer to Nephrology consultant documentation regarding Acute renal failure due to volume depletion vs toxin related ATN  -- Other - I will add my own diagnosis  -- Disagree - Not applicable / Not valid  -- Disagree - Clinically unable to determine / Unknown  -- Refer to Clinical Documentation Reviewer    PROVIDER RESPONSE TEXT:    I defer to Nephrology consultant regarding documentation of Acute renal failure due to volume depletion vs toxin related ATN.     Query created by: Johanna Jones on 2022 10:53 AM      Electronically signed by:  Yasmany Keen MD 2022 10:23 AM

## 2022-01-05 NOTE — PROGRESS NOTES
1934 - Head to toe assessment completed at this time. Pt denies CCP and SOB. Pt rated pain 5/10. No distress noted. 24G Left Hand IVF infusing as ordered. Call bell and personal items within reach. Will continue to monitor.

## 2022-01-05 NOTE — PROGRESS NOTES
Hospitalist Progress Note-critical care note     Patient: Tez Chen MRN: [id number]  CSN: [id number]    YOB: 1975  Age: 42 y.o. Sex: male    DOA: 12/30/2021 LOS:  LOS: 6 days            Chief complaint: colitis miladis , dm ,     Assessment/Plan         Hospital Problems  Date Reviewed: 12/30/2021          Codes Class Noted POA    * (Principal) Colitis ICD-10-CM: K52.9  ICD-9-CM: 558.9  12/30/2021 Unknown        Diarrhea ICD-10-CM: R19.7  ICD-9-CM: 787.91  12/30/2021 Unknown        MILADIS (acute kidney injury) ([facility] ICD-10-CM: N17.9  ICD-9-CM: 584.9  12/30/2021 Unknown        DM (diabetes mellitus) ([facility] ICD-10-CM: E11.9  ICD-9-CM: 250.00  12/30/2021 Unknown        Nausea & vomiting ICD-10-CM: R11.2  ICD-9-CM: 787.01  12/30/2021 Unknown        Diabetes mellitus ([facility] ICD-10-CM: E11.9  ICD-9-CM: 250.00  12/30/2021 Unknown        Generalized abdominal pain ICD-10-CM: R10.84  ICD-9-CM: 789.07  12/30/2021 Unknown        Acute kidney injury [facility]) ICD-10-CM: N17.9  ICD-9-CM: 584.9  12/30/2021 Unknown               Intractable nausea/ vomiting-  No n/v today , kub is good ,reported hungry will advance the diet   Change back to liquid diet   Suspect gastroparesis as etiology- gastric emptying test is not available in-pt setting   Change back  reglan change to pemeal   Recommend f/u with gi as out-pt, no gi service till after Jan 8th   covid 19 rapid 2 times negative        Abdominal pain.    Like due to colitis    No pain now       Colitis   No diarrhea   Continue tx   No diarrhea      MILADIS due to dehydration sill not improving   Stable , Bicarb added per renal   Renal us medical renal disease     DM2  Not received lantus -not eating since admission,   Reported eat a lot at home-I believe he still needs insulin after dc at home   D.c lantus, add back as needed , advance diet        HTN- uncontrolled  On norvasc , on hydralazine to 75 mg     Grade II diastolic dysfunction- compensated     Chest pain-no pain today    Stat ekg done,   Trop wnl    Stress test still pending     Subjective: I want to eat more, feel hungry . I was told I do not need insulin -just watch what I eat . My a1c was 10 , now 9.8. I used 10 unit at home     rn : can he has work note? Wife is at the bedside   All questions have been answered. 35 total min's spent on patient care including >50% on counseling/coordinating care. Discussed the above assessments. also discussed labs, medications and hospital course      Disposition :tbd,   Review of systems:    General: No fevers or chills. Cardiovascular: No chest pain or pressure. No palpitations. Pulmonary: No shortness of breath. Gastrointestinal: No nausea, no vomiting. Vital signs/Intake and Output:  Visit Vitals  BP (!) 160/90   Pulse (!) 104   Temp 98.3 °F (36.8 °C)   Resp 17   Ht 5' 6\" (1.676 m)   Wt 90.7 kg (200 lb)   SpO2 99%   BMI 32.28 kg/m²     Current Shift:  No intake/output data recorded. Last three shifts:  01/03 1901 - 01/05 0700  In: 2423 [P.O.:440; I.V.:1983]  Out: 2108 [Urine:4075]    Physical Exam:  General: WD, WN. Alert, cooperative, no acute distress    HEENT: NC, Atraumatic. PERRLA, anicteric sclerae. Lungs: CTA Bilaterally. No Wheezing/Rhonchi/Rales. Heart:  Regular  rhythm,  No murmur, No Rubs, No Gallops  Abdomen: Soft, Non distended, Non tender. +Bowel sounds,   Extremities: No c/c/e  Psych:   Not anxious or agitated. Neurologic:  No acute neurological deficit. Labs: Results:       Chemistry Recent Labs     01/05/22  0259 01/04/22  0240 01/03/22  0225   * 114* 128*    137 139   K 4.0 4.0 4.3    107 109   CO2 20* 17* 20*   BUN 40* 39* 37*   CREA 3.16* 3.13* 3.21*   CA 8.5 8.2* 8.4*   AGAP 9 13 10   BUCR 13 12 12      CBC w/Diff No results for input(s): WBC, RBC, HGB, HCT, PLT, GRANS, LYMPH, EOS, HGBEXT, HCTEXT, PLTEXT, HGBEXT, HCTEXT, PLTEXT in the last 72 hours.    Cardiac Enzymes No results for input(s): CPK, CKND1, PARTH in the last 72 hours. No lab exists for component: CKRMB, TROIP   Coagulation No results for input(s): PTP, INR, APTT, INREXT, INREXT in the last 72 hours. Lipid Panel No results found for: CHOL, CHOLPOCT, CHOLX, CHLST, CHOLV, 616031, HDL, HDLP, LDL, LDLC, DLDLP, 456413, VLDLC, VLDL, TGLX, TRIGL, TRIGP, TGLPOCT, CHHD, CHHDX   BNP No results for input(s): BNPP in the last 72 hours. Liver Enzymes No results for input(s): TP, ALB, TBIL, AP in the last 72 hours. No lab exists for component: SGOT, GPT, DBIL   Thyroid Studies No results found for: T4, T3U, TSH, TSHEXT, TSHEXT     Procedures/imaging: see electronic medical records for all procedures/Xrays and details which were not copied into this note but were reviewed prior to creation of Plan    XR CHEST PA LAT    Result Date: 12/30/2021  EXAM: XR CHEST PA LAT CLINICAL INDICATION/HISTORY: Chest pain -Additional: None COMPARISON: None TECHNIQUE: PA and lateral views of the chest _______________ FINDINGS: HEART AND MEDIASTINUM: Cardiac size and mediastinal contours are within normal limits LUNGS AND PLEURAL SPACES: No focal pneumonic consolidation, pneumothorax or pleural effusion BONY THORAX AND SOFT TISSUES: No acute osseous abnormality _______________     No acute findings in the chest.    CT ABD PELV WO CONT    Result Date: 12/30/2021  EXAM: CT of the abdomen and pelvis INDICATION: Epigastric abdominal pain, chest pain COMPARISON: None. TECHNIQUE: Axial CT imaging of the abdomen and pelvis was performed without intravenous contrast. Multiplanar reformats were generated. One or more dose reduction techniques were used on this CT: automated exposure control, adjustment of the mAs and/or kVp according to patient size, and iterative reconstruction techniques. The specific techniques used on this CT exam have been documented in the patient's electronic medical record.   Digital Imaging and Communications in Medicine (DICOM) format image data are available to nonaffiliated external healthcare facilities or entities on a secure, media free, reciprocally searchable basis with patient authorization for at least a 12-month period after this study. _______________ FINDINGS: LOWER CHEST: Streaky areas of atelectasis without evidence of consolidation or pleural effusion. Normal cardiac size. No pericardial effusion. LIVER, BILIARY: Liver is normal. No biliary dilation. Gallbladder is unremarkable. PANCREAS: Normal unenhanced appearance. SPLEEN: Normal. ADRENALS: Normal. KIDNEYS/URETERS/BLADDER: No evidence of hydronephrosis involving either kidney. No nephrolithiasis. Urinary bladder unremarkable. PELVIC ORGANS: Unremarkable. VASCULATURE: Unremarkable LYMPH NODES: No enlarged lymph nodes. GASTROINTESTINAL TRACT: There is mild wall thickening involving the proximal portion of the sigmoid colon with accompanying right colonic stranding and edema. No evidence of an organized or drainable pericolonic fluid collection. No gross free intraperitoneal perforation. Normal appendix. BONES: No acute or aggressive osseous abnormalities identified. OTHER: Rectus diastases involving the anterior abdominal wall _______________     1. Mild proximal sigmoid colonic wall thickening and inflammatory changes suggesting an infectious or inflammatory colitis.   > No evidence of an organized or drainable fluid collection.   > No gross free intraperitoneal perforation. 2. No evidence of urolithiasis or obstructive uropathy. ECHO ADULT COMPLETE    Result Date: 1/1/2022    Left Ventricle: Left ventricle size is normal. Mildly increased wall thickness. Normal wall motion. Normal left ventricular systolic function with a visually estimated EF of 60 - 65%. Grade II diastolic dysfunction with increased LAP.   Mitral Valve: Mildly thickened leaflets. Mild transvalvular regurgitation. No stenosis.    Tricuspid Valve: Tricuspid regurgitation is inadequate for estimation of right ventricular systolic pressure.   IVC/SVC: IVC diameter is less than or equal to 21 mm and decreases greater than 50% during inspiration; therefore the estimated right atrial pressure is normal (~3 mmHg).        Beckman Means, MD

## 2022-01-06 PROBLEM — R07.9 CHEST PAIN: Status: ACTIVE | Noted: 2022-01-06

## 2022-01-06 LAB
ANION GAP SERPL CALC-SCNC: 8 MMOL/L (ref 3–18)
ATRIAL RATE: 98 BPM
BUN SERPL-MCNC: 39 MG/DL (ref 7–18)
BUN/CREAT SERPL: 12 (ref 12–20)
CALCIUM SERPL-MCNC: 8.3 MG/DL (ref 8.5–10.1)
CALCULATED P AXIS, ECG09: 81 DEGREES
CALCULATED R AXIS, ECG10: 60 DEGREES
CALCULATED T AXIS, ECG11: 72 DEGREES
CHLORIDE SERPL-SCNC: 109 MMOL/L (ref 100–111)
CO2 SERPL-SCNC: 21 MMOL/L (ref 21–32)
CREAT SERPL-MCNC: 3.29 MG/DL (ref 0.6–1.3)
DIAGNOSIS, 93000: NORMAL
GLUCOSE BLD STRIP.AUTO-MCNC: 139 MG/DL (ref 70–110)
GLUCOSE BLD STRIP.AUTO-MCNC: 151 MG/DL (ref 70–110)
GLUCOSE BLD STRIP.AUTO-MCNC: 180 MG/DL (ref 70–110)
GLUCOSE SERPL-MCNC: 238 MG/DL (ref 74–99)
MAGNESIUM SERPL-MCNC: 2.1 MG/DL (ref 1.6–2.6)
P-R INTERVAL, ECG05: 136 MS
POTASSIUM SERPL-SCNC: 4 MMOL/L (ref 3.5–5.5)
Q-T INTERVAL, ECG07: 354 MS
QRS DURATION, ECG06: 72 MS
QTC CALCULATION (BEZET), ECG08: 451 MS
SODIUM SERPL-SCNC: 138 MMOL/L (ref 136–145)
TROPONIN-HIGH SENSITIVITY: 32 NG/L (ref 0–78)
VENTRICULAR RATE, ECG03: 98 BPM

## 2022-01-06 PROCEDURE — 80048 BASIC METABOLIC PNL TOTAL CA: CPT

## 2022-01-06 PROCEDURE — 74011250637 HC RX REV CODE- 250/637: Performed by: INTERNAL MEDICINE

## 2022-01-06 PROCEDURE — 74011250637 HC RX REV CODE- 250/637: Performed by: STUDENT IN AN ORGANIZED HEALTH CARE EDUCATION/TRAINING PROGRAM

## 2022-01-06 PROCEDURE — 74011250636 HC RX REV CODE- 250/636: Performed by: INTERNAL MEDICINE

## 2022-01-06 PROCEDURE — 74011250637 HC RX REV CODE- 250/637: Performed by: HOSPITALIST

## 2022-01-06 PROCEDURE — 74011636637 HC RX REV CODE- 636/637: Performed by: HOSPITALIST

## 2022-01-06 PROCEDURE — 36415 COLL VENOUS BLD VENIPUNCTURE: CPT

## 2022-01-06 PROCEDURE — 74011250636 HC RX REV CODE- 250/636: Performed by: HOSPITALIST

## 2022-01-06 PROCEDURE — 83735 ASSAY OF MAGNESIUM: CPT

## 2022-01-06 PROCEDURE — 65270000029 HC RM PRIVATE

## 2022-01-06 PROCEDURE — 82962 GLUCOSE BLOOD TEST: CPT

## 2022-01-06 PROCEDURE — 74011636637 HC RX REV CODE- 636/637: Performed by: INTERNAL MEDICINE

## 2022-01-06 PROCEDURE — 84484 ASSAY OF TROPONIN QUANT: CPT

## 2022-01-06 PROCEDURE — 74011000250 HC RX REV CODE- 250: Performed by: HOSPITALIST

## 2022-01-06 PROCEDURE — 93005 ELECTROCARDIOGRAM TRACING: CPT

## 2022-01-06 RX ORDER — CIPROFLOXACIN 2 MG/ML
200 INJECTION, SOLUTION INTRAVENOUS EVERY 12 HOURS
Status: DISCONTINUED | OUTPATIENT
Start: 2022-01-06 | End: 2022-01-12 | Stop reason: HOSPADM

## 2022-01-06 RX ORDER — HYDRALAZINE HYDROCHLORIDE 20 MG/ML
10 INJECTION INTRAMUSCULAR; INTRAVENOUS EVERY 6 HOURS
Status: DISCONTINUED | OUTPATIENT
Start: 2022-01-06 | End: 2022-01-12

## 2022-01-06 RX ORDER — INSULIN GLARGINE 100 [IU]/ML
5 INJECTION, SOLUTION SUBCUTANEOUS DAILY
Status: DISCONTINUED | OUTPATIENT
Start: 2022-01-06 | End: 2022-01-10

## 2022-01-06 RX ORDER — CETIRIZINE HCL 10 MG
5 TABLET ORAL DAILY
Status: DISCONTINUED | OUTPATIENT
Start: 2022-01-06 | End: 2022-01-12 | Stop reason: HOSPADM

## 2022-01-06 RX ORDER — METRONIDAZOLE 500 MG/100ML
500 INJECTION, SOLUTION INTRAVENOUS EVERY 8 HOURS
Status: DISCONTINUED | OUTPATIENT
Start: 2022-01-06 | End: 2022-01-12 | Stop reason: HOSPADM

## 2022-01-06 RX ADMIN — METOCLOPRAMIDE HYDROCHLORIDE 2.5 MG: 5 INJECTION INTRAMUSCULAR; INTRAVENOUS at 05:37

## 2022-01-06 RX ADMIN — INSULIN GLARGINE 5 UNITS: 100 INJECTION, SOLUTION SUBCUTANEOUS at 10:34

## 2022-01-06 RX ADMIN — AMLODIPINE BESYLATE 10 MG: 5 TABLET ORAL at 10:38

## 2022-01-06 RX ADMIN — MORPHINE SULFATE 2 MG: 2 INJECTION, SOLUTION INTRAMUSCULAR; INTRAVENOUS at 10:31

## 2022-01-06 RX ADMIN — CIPROFLOXACIN 200 MG: 2 INJECTION, SOLUTION INTRAVENOUS at 22:11

## 2022-01-06 RX ADMIN — HYDRALAZINE HYDROCHLORIDE 10 MG: 20 INJECTION INTRAMUSCULAR; INTRAVENOUS at 13:51

## 2022-01-06 RX ADMIN — METOCLOPRAMIDE HYDROCHLORIDE 2.5 MG: 5 INJECTION INTRAMUSCULAR; INTRAVENOUS at 20:12

## 2022-01-06 RX ADMIN — METRONIDAZOLE 500 MG: 500 INJECTION, SOLUTION INTRAVENOUS at 22:07

## 2022-01-06 RX ADMIN — SODIUM BICARBONATE 1300 MG: 650 TABLET ORAL at 23:57

## 2022-01-06 RX ADMIN — CIPROFLOXACIN HYDROCHLORIDE 250 MG: 500 TABLET, FILM COATED ORAL at 05:35

## 2022-01-06 RX ADMIN — HYDRALAZINE HYDROCHLORIDE 10 MG: 20 INJECTION INTRAMUSCULAR; INTRAVENOUS at 20:13

## 2022-01-06 RX ADMIN — METOCLOPRAMIDE HYDROCHLORIDE 2.5 MG: 5 INJECTION INTRAMUSCULAR; INTRAVENOUS at 12:11

## 2022-01-06 RX ADMIN — GABAPENTIN 300 MG: 300 CAPSULE ORAL at 00:23

## 2022-01-06 RX ADMIN — HYDRALAZINE HYDROCHLORIDE 75 MG: 25 TABLET, FILM COATED ORAL at 05:35

## 2022-01-06 RX ADMIN — ONDANSETRON 4 MG: 2 INJECTION INTRAMUSCULAR; INTRAVENOUS at 08:19

## 2022-01-06 RX ADMIN — PANTOPRAZOLE SODIUM 40 MG: 40 TABLET, DELAYED RELEASE ORAL at 10:38

## 2022-01-06 RX ADMIN — Medication 2 UNITS: at 18:09

## 2022-01-06 RX ADMIN — ONDANSETRON 4 MG: 2 INJECTION INTRAMUSCULAR; INTRAVENOUS at 15:23

## 2022-01-06 RX ADMIN — LIDOCAINE HYDROCHLORIDE 40 ML: 20 SOLUTION TOPICAL at 11:47

## 2022-01-06 RX ADMIN — ENOXAPARIN SODIUM 30 MG: 100 INJECTION SUBCUTANEOUS at 11:52

## 2022-01-06 RX ADMIN — GABAPENTIN 300 MG: 300 CAPSULE ORAL at 23:58

## 2022-01-06 RX ADMIN — PANTOPRAZOLE SODIUM 40 MG: 40 TABLET, DELAYED RELEASE ORAL at 20:15

## 2022-01-06 RX ADMIN — Medication 2 UNITS: at 10:35

## 2022-01-06 RX ADMIN — METRONIDAZOLE 500 MG: 250 TABLET ORAL at 05:35

## 2022-01-06 NOTE — PROGRESS NOTES
RENAL CONSULT PROGRESS NOTE   2022    Patient:  Pari Awan  :  1975  Gender:  male  MRN #:  [id number]    Reason for Consult: Acute renal failure     Assessment: Pari Awan is a 42y.o. year old male with h/o  DM, HTN,Neuropathy admitted for colitis, reportedly had more than 10 episodes of watery diarrhea on  and h/o loose stool/diarrhoea for 1 yr and uncontrolled DM. Baseline serum creatinine on  was 0.94 mg/dl on admission it was 3.47 mg/dl and is 3.28 mg/dl on    Ct abdomen and pelvis does not have evidence of hydronephrosis and obstruction . He has decent urine output overnight . Hemodynamically stable without evidence of hypotension. No evidence of proteinuria and hematuria on urine analysis . Does not have clinically significant proteinuria so less likely glomerular disease   So based on presentation Acute renal failure most likely is  pre renal volume depletion due to watery diarrhoea vs toxin related ATN due to infection/colitis       SUBJECTIVE:   -he says he has nausea , epigastric discomfort and chest pain   Denied problems in urinating   No nausea and vomiting        PLAN:-   - Since admission he looks sick, mostly complaints of abdominal pain and chest pain and poor appetite, has flat affect and does not communicate much . I doubt those manifestation is due to renal failure alone  Consider GI consult as well along with cardiology .  Does not have hematuria and proteinuria , less likely glomerular process, still believe it is ATN due to severe diarrhea for long time before presentation, but other causes are not excluded , need to follow renal trajectory for lit bit before further work up     - Decent urine output overnight, creatinine is slightly up this morning    - Urine electrolytes now is not consistent with pre re-renal. Considering poor intake would continue normal saline 75  cc/hour until tomorrow morning , close attention to BP   - Ensure that he does not retain urine  -- Intake and output  - Hold ACEI/ARBS and lasix  - Continue amlodipine for hypertension , continue hydralazine to 75 mg TID for hypertension   - Avoid NSAIDS,.contrats and nephrotoxin  - Dose all meds including gabapentin  and antibiotics for current eGFR  - No clinical and metabolic indication of RRT  - He has risk factors for CKD, optimum diabetic and BP management   - continue sodium bicarbonate 1300 mg BID for metabolic acidosis, most likely bicarb loss in GI. Stop once serum bicarb above 22 mmol/lt     Rest of the management per primary team       Intake/Output Summary (Last 24 hours) at 1/6/2022 1302  Last data filed at 1/6/2022 0541  Gross per 24 hour   Intake 1870 ml   Output    Net 1870 ml         Objective:    Visit Vitals  BP (!) 143/83   Pulse (!) 104   Temp 98.4 °F (36.9 °C)   Resp 20   Ht 5' 6\" (1.676 m)   Wt 90.7 kg (200 lb)   SpO2 100%   BMI 32.28 kg/m²       Physical Exam:    Pt awake  HEENT:-  no neck swelling  Lung: clear to auscultation  Ext: no edema  abdomen is non tender       Laboratory Data:    Lab Results   Component Value Date    BUN 39 (H) 01/06/2022    BUN 40 (H) 01/05/2022    BUN 39 (H) 01/04/2022     01/06/2022     01/05/2022     01/04/2022    CO2 21 01/06/2022    CO2 20 (L) 01/05/2022    CO2 17 (L) 01/04/2022     Lab Results   Component Value Date    WBC 12.5 01/02/2022    HGB 13.7 01/02/2022    HCT 40.5 01/02/2022     No components found for: CALCIUM, PHOSPHORUS, MAGNESIUM  No results found for: HDL  No results found for: SPECIMENTYP, TURBIDITY, UGLU    Imaging Reveiwed:    CT abdomen/pelvis 12/30/21: Mild proximal sigmoid colonic wall thickening and inflammatory changes  suggesting an infectious or inflammatory colitis.     > No evidence of an organized or drainable fluid collection.     > No gross free intraperitoneal perforation.     2.  No evidence of urolithiasis or obstructive uropathy.           Jae Thacker MD   [facility].- NEPHROLOGY

## 2022-01-06 NOTE — ROUTINE PROCESS
Bedside and Verbal shift change report given to [employer], RN by Nayeli Dewitt. Report included the following information SBAR, Kardex, OR Summary, Intake/Output and MAR.

## 2022-01-06 NOTE — PROGRESS NOTES
Comprehensive Nutrition Assessment    Type and Reason for Visit: Initial (LOS)    Nutrition Recommendations/Plan: Will order Glucerna BID. Nutrition Assessment:  PMHx: DM, HTN, neuropathy. Admitted with nausea/vomiting- still nauseous but can keep food down; MD suspects gastroparesis, colitis, no diarrhea, MILADIS d/t dehydration- not improving, abdominal pain likely d/y colitis- no pain now. RRT was called today. Estimated Daily Nutrient Needs:  Energy (kcal): 2088; Weight Used for Energy Requirements: Current  Protein (g): 90; Weight Used for Protein Requirements: Current (1g)  Fluid (ml/day): 2088; Method Used for Fluid Requirements: 1 ml/kcal    Nutrition Related Findings:  Lab: glucose 238, POC glucose 116-226, GFR est AA 25, creatinine 3.29. Med: sodium bicarbonate, protonix, reglan 3x daily, humalog, lantus. BM 1/5. Pt was in full liquids yesterday- advanced to regular. Per documentation: % x2 of full liquids yesterday and 640ml PO this AM. Weight hx reviewed- no signficiant weight change. Pt awaken to vocal que, mostly nodded yes or no to questions. Noted meal tray at bedside; stated it was breakfast. Asked if pt has been eating <50% of meals- nodded yes. Asked if pt would think Glucerna would help with appetite- nodded yes. Will order Glucerna and continue to monitor will admitted. Wounds:    None       Current Nutrition Therapies:  ADULT DIET Regular; 4 carb choices (60 gm/meal)    Anthropometric Measures:  · Height:  5' 6\" (167.6 cm)  · Current Body Wt:  90.7 kg (200 lb)   · Admission Body Wt:  200 lb    · Usual Body Wt:  93 kg (205 lb) (1/19/2021)     · Ideal Body Wt:  142 lbs:  140.8 %   · BMI Category:  Obese class 1 (BMI 30.0-34. 9)       Nutrition Diagnosis:   · Inadequate energy intake related to altered GI function as evidenced by intake 26-50%      Nutrition Interventions:   Food and/or Nutrient Delivery: Continue current diet  Nutrition Education and Counseling: No recommendations at this time  Coordination of Nutrition Care: Continue to monitor while inpatient    Goals:  PO intake >50% of meals throughout the next 4-5 days       Nutrition Monitoring and Evaluation:   Behavioral-Environmental Outcomes: None identified  Food/Nutrient Intake Outcomes: Food and nutrient intake  Physical Signs/Symptoms Outcomes: Biochemical data,Meal time behavior,Skin,Weight,GI status,Nausea/vomiting    Discharge Planning:    Continue current diet     Electronically signed by Nu Zaragoza RD on 1/6/2022 at 11:25 AM

## 2022-01-06 NOTE — PROGRESS NOTES
Hospitalist Progress Note-critical care note     Patient: Florencia Current MRN: [id number]  CSN: [id number]    YOB: 1975  Age: 42 y.o.   Sex: male    DOA: 12/30/2021 LOS:  LOS: 7 days            Chief complaint: colitis miladis , dm ,     Assessment/Plan         Hospital Problems  Date Reviewed: 12/30/2021          Codes Class Noted POA    * (Principal) Colitis ICD-10-CM: K52.9  ICD-9-CM: 558.9  12/30/2021 Unknown        Diarrhea ICD-10-CM: R19.7  ICD-9-CM: 787.91  12/30/2021 Unknown        MILADIS (acute kidney injury) ([facility] ICD-10-CM: N17.9  ICD-9-CM: 584.9  12/30/2021 Unknown        DM (diabetes mellitus) ([facility] ICD-10-CM: E11.9  ICD-9-CM: 250.00  12/30/2021 Unknown        Nausea & vomiting ICD-10-CM: R11.2  ICD-9-CM: 787.01  12/30/2021 Unknown        Diabetes mellitus ([facility] ICD-10-CM: E11.9  ICD-9-CM: 250.00  12/30/2021 Unknown        Generalized abdominal pain ICD-10-CM: R10.84  ICD-9-CM: 789.07  12/30/2021 Unknown        Acute kidney injury [facility]) ICD-10-CM: N17.9  ICD-9-CM: 584.9  12/30/2021 Unknown               Intractable nausea/ vomiting-  Still nausea, but can keep food down -continue symptom tx   Suspect gastroparesis as etiology- gastric emptying test is not available in-pt setting   Change back  reglan change to pemeal   Recommend f/u with gi as out-pt, no gi service till after Jan 8th   covid 19 rapid 2 times negative   Continue symptom tx        Abdominal pain. -no pain now   Like due to colitis    No pain now       Colitis   No diarrhea   Continue tx      MILADIS due to dehydration sill not improving   Stable , Bicarb added per renal   Renal us medical renal disease     DM2  Glucose was over 200 AM, add low dose lantus am, not giving, discussed with RN   Continue ssi ,low dose lantus      HTN- uncontrolled  On norvasc , on hydralazine to 75 mg     Grade II diastolic dysfunction- compensated     Chest pain-no pain today    Stat ekg done,   Trop wnl    Stress test negative for ischemia Subjective: still nausea, but can keep food down     Addendum :back to pt bedside due to rrt called. Same pain like before, reproducible. Morphine one time given, give gi cocktail ,stress test negative yesterday. ekg repeated, no change. One time trop Will give one time gi cocktail and do v/q scan. Disposition :tbd,   Review of systems:    General: No fevers or chills. Cardiovascular: No chest pain or pressure. No palpitations. Pulmonary: No shortness of breath. Gastrointestinal: +nausea, no vomiting. Vital signs/Intake and Output:  Visit Vitals  BP (!) 161/99   Pulse 95   Temp 99.2 °F (37.3 °C)   Resp 16   Ht 5' 6\" (1.676 m)   Wt 90.7 kg (200 lb)   SpO2 98%   BMI 32.28 kg/m²     Current Shift:  No intake/output data recorded. Last three shifts:  01/04 1901 - 01/06 0700  In: 5530 [P.O.:1260; I.V.:2080]  Out: 5006 [Urine:1600]    Physical Exam:  General: WD, WN. Alert, cooperative, no acute distress    HEENT: NC, Atraumatic. PERRLA, anicteric sclerae. Lungs: CTA Bilaterally. No Wheezing/Rhonchi/Rales. Heart:  Regular  rhythm,  No murmur, No Rubs, No Gallops  Abdomen: Soft, Non distended, Non tender. +Bowel sounds,   Extremities: No c/c/e  Psych:   Not anxious or agitated. Neurologic:  No acute neurological deficit. Labs: Results:       Chemistry Recent Labs     01/06/22  0215 01/05/22  0259 01/04/22  0240   * 114* 114*    139 137   K 4.0 4.0 4.0    110 107   CO2 21 20* 17*   BUN 39* 40* 39*   CREA 3.29* 3.16* 3.13*   CA 8.3* 8.5 8.2*   AGAP 8 9 13   BUCR 12 13 12      CBC w/Diff No results for input(s): WBC, RBC, HGB, HCT, PLT, GRANS, LYMPH, EOS, HGBEXT, HCTEXT, PLTEXT, HGBEXT, HCTEXT, PLTEXT in the last 72 hours. Cardiac Enzymes No results for input(s): CPK, CKND1, PARTH in the last 72 hours. No lab exists for component: CKRMB, TROIP   Coagulation No results for input(s): PTP, INR, APTT, INREXT, INREXT in the last 72 hours.     Lipid Panel No results found for: CHOL, CHOLPOCT, CHOLX, CHLST, CHOLV, 449512, HDL, HDLP, LDL, LDLC, DLDLP, 566989, VLDLC, VLDL, TGLX, TRIGL, TRIGP, TGLPOCT, CHHD, CHHDX   BNP No results for input(s): BNPP in the last 72 hours. Liver Enzymes No results for input(s): TP, ALB, TBIL, AP in the last 72 hours. No lab exists for component: SGOT, GPT, DBIL   Thyroid Studies No results found for: T4, T3U, TSH, TSHEXT, TSHEXT     Procedures/imaging: see electronic medical records for all procedures/Xrays and details which were not copied into this note but were reviewed prior to creation of Plan    XR CHEST PA LAT    Result Date: 12/30/2021  EXAM: XR CHEST PA LAT CLINICAL INDICATION/HISTORY: Chest pain -Additional: None COMPARISON: None TECHNIQUE: PA and lateral views of the chest _______________ FINDINGS: HEART AND MEDIASTINUM: Cardiac size and mediastinal contours are within normal limits LUNGS AND PLEURAL SPACES: No focal pneumonic consolidation, pneumothorax or pleural effusion BONY THORAX AND SOFT TISSUES: No acute osseous abnormality _______________     No acute findings in the chest.    CT ABD PELV WO CONT    Result Date: 12/30/2021  EXAM: CT of the abdomen and pelvis INDICATION: Epigastric abdominal pain, chest pain COMPARISON: None. TECHNIQUE: Axial CT imaging of the abdomen and pelvis was performed without intravenous contrast. Multiplanar reformats were generated. One or more dose reduction techniques were used on this CT: automated exposure control, adjustment of the mAs and/or kVp according to patient size, and iterative reconstruction techniques. The specific techniques used on this CT exam have been documented in the patient's electronic medical record. Digital Imaging and Communications in Medicine (DICOM) format image data are available to nonaffiliated external healthcare facilities or entities on a secure, media free, reciprocally searchable basis with patient authorization for at least a 12-month period after this study. _______________ FINDINGS: LOWER CHEST: Streaky areas of atelectasis without evidence of consolidation or pleural effusion. Normal cardiac size. No pericardial effusion. LIVER, BILIARY: Liver is normal. No biliary dilation. Gallbladder is unremarkable. PANCREAS: Normal unenhanced appearance. SPLEEN: Normal. ADRENALS: Normal. KIDNEYS/URETERS/BLADDER: No evidence of hydronephrosis involving either kidney. No nephrolithiasis. Urinary bladder unremarkable. PELVIC ORGANS: Unremarkable. VASCULATURE: Unremarkable LYMPH NODES: No enlarged lymph nodes. GASTROINTESTINAL TRACT: There is mild wall thickening involving the proximal portion of the sigmoid colon with accompanying right colonic stranding and edema. No evidence of an organized or drainable pericolonic fluid collection. No gross free intraperitoneal perforation. Normal appendix. BONES: No acute or aggressive osseous abnormalities identified. OTHER: Rectus diastases involving the anterior abdominal wall _______________     1. Mild proximal sigmoid colonic wall thickening and inflammatory changes suggesting an infectious or inflammatory colitis.   > No evidence of an organized or drainable fluid collection.   > No gross free intraperitoneal perforation. 2. No evidence of urolithiasis or obstructive uropathy. ECHO ADULT COMPLETE    Result Date: 1/1/2022    Left Ventricle: Left ventricle size is normal. Mildly increased wall thickness. Normal wall motion. Normal left ventricular systolic function with a visually estimated EF of 60 - 65%. Grade II diastolic dysfunction with increased LAP.   Mitral Valve: Mildly thickened leaflets. Mild transvalvular regurgitation. No stenosis.   Tricuspid Valve: Tricuspid regurgitation is inadequate for estimation of right ventricular systolic pressure.   IVC/SVC: IVC diameter is less than or equal to 21 mm and decreases greater than 50% during inspiration; therefore the estimated right atrial pressure is normal (~3 mmHg). Simi Odell MD

## 2022-01-06 NOTE — PROGRESS NOTES
1435  Bedside shift change report given to [id number] (oncoming nurse) by Giuseppe Cotton RN (offgoing nurse). Report included the following information SBAR, Kardex, Intake/Output and MAR.     [id number]  Rapid response called. Patient c/o 10/10 chest pressure to center of chest. Denies SOB. Stat EKG, cardiac enzymes, morphine 2 mg and GI cocktail ordered. 1100  Patient reports pain improved to 3/10.    1300  Patient reports pain 0/10 after cocktail. C/o nausea. Refusing BP meds. Bp elevated 172/104.     1323  Paged Dr. Vanessa Jorge.    [phone]  Returned call. Dr. Vanessa Jorge aware. 1504  Patient very drowsy. States he is tired. Patient was startled upon awaking. Some stuttering noted, patient stated because his nose is congested. A&Ox4. No signs of stroke noted. Dr. Vanessa Jorge made aware. [address] [facility]  Dr. Vanessa Jorge aware patient refusing cipro and flagyl d/t nausea. Nausea has been intermittent throughout shift. Po intake poor. 1900  Patient resting. Requesting to sleep. 1945  Bedside and verbal shift change report given to [address] by EYAD De La Cruz. Report included SBAR, kardex, MAR, and recent results.

## 2022-01-06 NOTE — PROGRESS NOTES
2774  Bedside shift change report [facility] [id number] (oncoming nurse) by Calderon Elder RN (offgoing nurse). Report included the following information SBAR, Kardex, Intake/Output and MAR.     1230  Spoke to Dr. Seng Hatfield. Patient completed stress test. Requesting food. 1928  Bedside and verbal shift change report given to Westley Parrish RN by Kristan Guillen RN. Report included SBAR, kardex, MAR, and recent results. Patient tolerated liquids throughout shift, but at this time feels slightly nauseated. Reglan recently admin, will give zofran if needed. Encouraged patient to not advance to solid foods until nausea resolved.

## 2022-01-06 NOTE — PROGRESS NOTES
1932 - Bedside report received from [place], [facility]. Patient in bed. Pain 0/10.     2036 - Patient in bed at this time. IV to [address]  intact and patent.  + CMS. Pt A & O x 4. LS clear, on RA. Abdomen soft, NT and ND. + BS to all 4 quadrants. Denies nausea. Pain 0/10. Call light within reach. Pt had an uneventful shift. Continues to deny nausea this AMM and says he is improving. No issues/concerns at this time.  Call bell within reach

## 2022-01-06 NOTE — PROGRESS NOTES
Pt had nuc stress test done 1/5/22. Cannot perform VQ scan today due to using same isotope. Notified Dr. John Pennington it will have to be done tomorrow.

## 2022-01-06 NOTE — PROGRESS NOTES
Pt is  and independent.  Pt has confirmed his PCP. Ashwin Shaw encourage ambulation or order therapy services as appropriate to assist with identifying potential transition of care needs.  Anticipate pt will transition home with oral ABX and  physician follow up when medically stable.  CM to cotninue to follow and assist.   Noted pt had an RRT today. Care Management Interventions  Mode of Transport at Discharge:  Other (see comment) (Family)  Transition of Care Consult (CM Consult): Discharge Planning  Health Maintenance Reviewed: Yes  Support Systems: Spouse/Significant Other  Confirm Follow Up Transport: Self  The Plan for Transition of Care is Related to the Following Treatment Goals : Home with physician follow up  Discharge Location  Discharge Placement: Home with family assistance

## 2022-01-07 ENCOUNTER — APPOINTMENT (OUTPATIENT)
Dept: NUCLEAR MEDICINE | Age: 47
DRG: 073 | End: 2022-01-07
Attending: HOSPITALIST
Payer: COMMERCIAL

## 2022-01-07 ENCOUNTER — APPOINTMENT (OUTPATIENT)
Dept: GENERAL RADIOLOGY | Age: 47
DRG: 073 | End: 2022-01-07
Attending: HOSPITALIST
Payer: COMMERCIAL

## 2022-01-07 LAB
ANION GAP SERPL CALC-SCNC: 11 MMOL/L (ref 3–18)
ATRIAL RATE: 99 BPM
BUN SERPL-MCNC: 33 MG/DL (ref 7–18)
BUN/CREAT SERPL: 10 (ref 12–20)
CALCIUM SERPL-MCNC: 8.3 MG/DL (ref 8.5–10.1)
CALCULATED P AXIS, ECG09: 72 DEGREES
CALCULATED R AXIS, ECG10: 7 DEGREES
CALCULATED T AXIS, ECG11: 83 DEGREES
CHLORIDE SERPL-SCNC: 110 MMOL/L (ref 100–111)
CO2 SERPL-SCNC: 20 MMOL/L (ref 21–32)
CREAT SERPL-MCNC: 3.17 MG/DL (ref 0.6–1.3)
DIAGNOSIS, 93000: NORMAL
GLUCOSE BLD STRIP.AUTO-MCNC: 112 MG/DL (ref 70–110)
GLUCOSE BLD STRIP.AUTO-MCNC: 130 MG/DL (ref 70–110)
GLUCOSE BLD STRIP.AUTO-MCNC: 148 MG/DL (ref 70–110)
GLUCOSE BLD STRIP.AUTO-MCNC: 162 MG/DL (ref 70–110)
GLUCOSE BLD STRIP.AUTO-MCNC: 173 MG/DL (ref 70–110)
GLUCOSE SERPL-MCNC: 170 MG/DL (ref 74–99)
MAGNESIUM SERPL-MCNC: 2 MG/DL (ref 1.6–2.6)
P-R INTERVAL, ECG05: 140 MS
POTASSIUM SERPL-SCNC: 3.9 MMOL/L (ref 3.5–5.5)
Q-T INTERVAL, ECG07: 340 MS
QRS DURATION, ECG06: 74 MS
QTC CALCULATION (BEZET), ECG08: 436 MS
SODIUM SERPL-SCNC: 141 MMOL/L (ref 136–145)
VENTRICULAR RATE, ECG03: 99 BPM

## 2022-01-07 PROCEDURE — 74011250636 HC RX REV CODE- 250/636: Performed by: INTERNAL MEDICINE

## 2022-01-07 PROCEDURE — 74011250636 HC RX REV CODE- 250/636: Performed by: HOSPITALIST

## 2022-01-07 PROCEDURE — 74011000250 HC RX REV CODE- 250: Performed by: INTERNAL MEDICINE

## 2022-01-07 PROCEDURE — 74011250636 HC RX REV CODE- 250/636: Performed by: STUDENT IN AN ORGANIZED HEALTH CARE EDUCATION/TRAINING PROGRAM

## 2022-01-07 PROCEDURE — 74011250637 HC RX REV CODE- 250/637: Performed by: HOSPITALIST

## 2022-01-07 PROCEDURE — 71045 X-RAY EXAM CHEST 1 VIEW: CPT

## 2022-01-07 PROCEDURE — 74011250637 HC RX REV CODE- 250/637: Performed by: INTERNAL MEDICINE

## 2022-01-07 PROCEDURE — 74011636637 HC RX REV CODE- 636/637: Performed by: INTERNAL MEDICINE

## 2022-01-07 PROCEDURE — 82962 GLUCOSE BLOOD TEST: CPT

## 2022-01-07 PROCEDURE — 80048 BASIC METABOLIC PNL TOTAL CA: CPT

## 2022-01-07 PROCEDURE — 83735 ASSAY OF MAGNESIUM: CPT

## 2022-01-07 PROCEDURE — 74011250637 HC RX REV CODE- 250/637: Performed by: STUDENT IN AN ORGANIZED HEALTH CARE EDUCATION/TRAINING PROGRAM

## 2022-01-07 PROCEDURE — 74011636637 HC RX REV CODE- 636/637: Performed by: HOSPITALIST

## 2022-01-07 PROCEDURE — A9540 TC99M MAA: HCPCS

## 2022-01-07 PROCEDURE — 36415 COLL VENOUS BLD VENIPUNCTURE: CPT

## 2022-01-07 PROCEDURE — 65270000029 HC RM PRIVATE

## 2022-01-07 RX ORDER — METOPROLOL TARTRATE 5 MG/5ML
5 INJECTION INTRAVENOUS EVERY 6 HOURS
Status: DISCONTINUED | OUTPATIENT
Start: 2022-01-07 | End: 2022-01-12

## 2022-01-07 RX ADMIN — METOCLOPRAMIDE HYDROCHLORIDE 2.5 MG: 5 INJECTION INTRAMUSCULAR; INTRAVENOUS at 11:20

## 2022-01-07 RX ADMIN — METRONIDAZOLE 500 MG: 500 INJECTION, SOLUTION INTRAVENOUS at 05:34

## 2022-01-07 RX ADMIN — MORPHINE SULFATE 2 MG: 2 INJECTION, SOLUTION INTRAMUSCULAR; INTRAVENOUS at 11:19

## 2022-01-07 RX ADMIN — PANTOPRAZOLE SODIUM 40 MG: 40 TABLET, DELAYED RELEASE ORAL at 09:29

## 2022-01-07 RX ADMIN — Medication 2 UNITS: at 09:31

## 2022-01-07 RX ADMIN — CIPROFLOXACIN 200 MG: 2 INJECTION, SOLUTION INTRAVENOUS at 21:14

## 2022-01-07 RX ADMIN — HYDRALAZINE HYDROCHLORIDE 10 MG: 20 INJECTION INTRAMUSCULAR; INTRAVENOUS at 09:31

## 2022-01-07 RX ADMIN — SODIUM CHLORIDE, PRESERVATIVE FREE 10 ML: 5 INJECTION INTRAVENOUS at 21:14

## 2022-01-07 RX ADMIN — HYDRALAZINE HYDROCHLORIDE 10 MG: 20 INJECTION INTRAMUSCULAR; INTRAVENOUS at 01:50

## 2022-01-07 RX ADMIN — Medication 2 UNITS: at 11:29

## 2022-01-07 RX ADMIN — ONDANSETRON 4 MG: 2 INJECTION INTRAMUSCULAR; INTRAVENOUS at 09:31

## 2022-01-07 RX ADMIN — INSULIN GLARGINE 5 UNITS: 100 INJECTION, SOLUTION SUBCUTANEOUS at 09:32

## 2022-01-07 RX ADMIN — SODIUM CHLORIDE 75 ML/HR: 9 INJECTION, SOLUTION INTRAVENOUS at 17:37

## 2022-01-07 RX ADMIN — METOPROLOL TARTRATE 5 MG: 5 INJECTION INTRAVENOUS at 18:58

## 2022-01-07 RX ADMIN — METOCLOPRAMIDE HYDROCHLORIDE 2.5 MG: 5 INJECTION INTRAMUSCULAR; INTRAVENOUS at 06:40

## 2022-01-07 RX ADMIN — PANTOPRAZOLE SODIUM 40 MG: 40 TABLET, DELAYED RELEASE ORAL at 19:49

## 2022-01-07 RX ADMIN — HYDRALAZINE HYDROCHLORIDE 10 MG: 20 INJECTION INTRAMUSCULAR; INTRAVENOUS at 15:44

## 2022-01-07 RX ADMIN — CIPROFLOXACIN 200 MG: 2 INJECTION, SOLUTION INTRAVENOUS at 09:32

## 2022-01-07 RX ADMIN — HYDRALAZINE HYDROCHLORIDE 10 MG: 20 INJECTION INTRAMUSCULAR; INTRAVENOUS at 22:15

## 2022-01-07 RX ADMIN — ENOXAPARIN SODIUM 30 MG: 100 INJECTION SUBCUTANEOUS at 11:20

## 2022-01-07 RX ADMIN — METRONIDAZOLE 500 MG: 500 INJECTION, SOLUTION INTRAVENOUS at 22:16

## 2022-01-07 RX ADMIN — METOCLOPRAMIDE HYDROCHLORIDE 2.5 MG: 5 INJECTION INTRAMUSCULAR; INTRAVENOUS at 19:48

## 2022-01-07 RX ADMIN — METRONIDAZOLE 500 MG: 500 INJECTION, SOLUTION INTRAVENOUS at 15:36

## 2022-01-07 RX ADMIN — AMLODIPINE BESYLATE 10 MG: 5 TABLET ORAL at 09:29

## 2022-01-07 RX ADMIN — CETIRIZINE HYDROCHLORIDE 5 MG: 10 TABLET, FILM COATED ORAL at 09:29

## 2022-01-07 RX ADMIN — SODIUM CHLORIDE, PRESERVATIVE FREE 10 ML: 5 INJECTION INTRAVENOUS at 15:36

## 2022-01-07 NOTE — PROGRESS NOTES
Hospitalist Progress Note-critical care note     Patient: Eugene Pearce MRN: [id number]  CSN: [id number]    YOB: 1975  Age: 42 y.o.   Sex: male    DOA: 12/30/2021 LOS:  LOS: 8 days            Chief complaint: colitis emanuel , dm ,     Assessment/Plan         Hospital Problems  Date Reviewed: 12/30/2021          Codes Class Noted POA    Chest pain ICD-10-CM: R07.9  ICD-9-CM: 786.50  1/6/2022 Unknown        * (Principal) Colitis ICD-10-CM: K52.9  ICD-9-CM: 558.9  12/30/2021 Unknown        Diarrhea ICD-10-CM: R19.7  ICD-9-CM: 787.91  12/30/2021 Unknown        EMANUEL (acute kidney injury) ([facility] ICD-10-CM: N17.9  ICD-9-CM: 584.9  12/30/2021 Unknown        DM (diabetes mellitus) ([facility] ICD-10-CM: E11.9  ICD-9-CM: 250.00  12/30/2021 Unknown        Nausea & vomiting ICD-10-CM: R11.2  ICD-9-CM: 787.01  12/30/2021 Unknown        Diabetes mellitus ([facility] ICD-10-CM: E11.9  ICD-9-CM: 250.00  12/30/2021 Unknown        Generalized abdominal pain ICD-10-CM: R10.84  ICD-9-CM: 789.07  12/30/2021 Unknown        Acute kidney injury [facility]) ICD-10-CM: N17.9  ICD-9-CM: 584.9  12/30/2021 Unknown               Intractable nausea/ vomiting-  Still nausea, but no vomiting   Suspect gastroparesis as etiology- gastric emptying test is not available in-pt setting   Change back  reglan change to pemeal   Recommend f/u with gi as out-pt, no gi service till after Jan 8th   covid 19 rapid 2 times negative   Continue symptom tx        Abdominal pain. -no pain now       Colitis   No diarrhea   Continue tx      EMANUEL -still elevated,but Stable , Bicarb added per renal   Renal [facility] medical renal disease     DM2  Continue ssi ,low dose lantus      HTN- uncontrolled  Change to iv hydralazine     Grade II diastolic dysfunction- compensated     Chest pain-no pain today    Trop wnl    Stress test negative for ischemia   V/q scan no PE     Subjective: still nausea, but can keep food down       Disposition :tbd,   Review of systems:    General: No fevers or chills. Cardiovascular: No chest pain or pressure. No palpitations. Pulmonary: No shortness of breath. Gastrointestinal: +nausea, no vomiting. Vital signs/Intake and Output:  Visit Vitals  BP (!) 157/93 (BP 1 Location: Left upper arm, BP Patient Position: At rest)   Pulse (!) 109   Temp 99.2 °F (37.3 °C)   Resp 17   Ht 5' 6\" (1.676 m)   Wt 97.1 kg (214 lb)   SpO2 95%   BMI 34.54 kg/m²     Current Shift:  No intake/output data recorded. Last three shifts:  01/05 1901 - 01/07 0700  In: 2387 [P.O.:940; I.V.:1447]  Out: 3700 [Urine:3700]    Physical Exam:  General: WD, WN. Alert, cooperative, no acute distress    HEENT: NC, Atraumatic. PERRLA, anicteric sclerae. Lungs: CTA Bilaterally. No Wheezing/Rhonchi/Rales. Heart:  Regular  rhythm,  No murmur, No Rubs, No Gallops  Abdomen: Soft, Non distended, Non tender. +Bowel sounds,   Extremities: No c/c/e  Psych:   Not anxious or agitated. Neurologic:  No acute neurological deficit. Labs: Results:       Chemistry Recent Labs     01/07/22  0536 01/06/22  0215 01/05/22  0259   * 238* 114*    138 139   K 3.9 4.0 4.0    109 110   CO2 20* 21 20*   BUN 33* 39* 40*   CREA 3.17* 3.29* 3.16*   CA 8.3* 8.3* 8.5   AGAP 11 8 9   BUCR 10* 12 13      CBC w/Diff No results for input(s): WBC, RBC, HGB, HCT, PLT, GRANS, LYMPH, EOS, HGBEXT, HCTEXT, PLTEXT, HGBEXT, HCTEXT, PLTEXT in the last 72 hours. Cardiac Enzymes No results for input(s): CPK, CKND1, PARTH in the last 72 hours. No lab exists for component: CKRMB, TROIP   Coagulation No results for input(s): PTP, INR, APTT, INREXT, INREXT in the last 72 hours. Lipid Panel No results found for: CHOL, CHOLPOCT, CHOLX, CHLST, CHOLV, 448714, HDL, HDLP, LDL, LDLC, DLDLP, 629672, VLDLC, VLDL, TGLX, TRIGL, TRIGP, TGLPOCT, CHHD, CHHDX   BNP No results for input(s): BNPP in the last 72 hours. Liver Enzymes No results for input(s): TP, ALB, TBIL, AP in the last 72 hours.     No lab exists for component: SGOT, GPT, DBIL   Thyroid Studies No results found for: T4, T3U, TSH, TSHEXT, TSHEXT     Procedures/imaging: see electronic medical records for all procedures/Xrays and details which were not copied into this note but were reviewed prior to creation of Plan    XR CHEST PA LAT    Result Date: 12/30/2021  EXAM: XR CHEST PA LAT CLINICAL INDICATION/HISTORY: Chest pain -Additional: None COMPARISON: None TECHNIQUE: PA and lateral views of the chest _______________ FINDINGS: HEART AND MEDIASTINUM: Cardiac size and mediastinal contours are within normal limits LUNGS AND PLEURAL SPACES: No focal pneumonic consolidation, pneumothorax or pleural effusion BONY THORAX AND SOFT TISSUES: No acute osseous abnormality _______________     No acute findings in the chest.    CT ABD PELV WO CONT    Result Date: 12/30/2021  EXAM: CT of the abdomen and pelvis INDICATION: Epigastric abdominal pain, chest pain COMPARISON: None. TECHNIQUE: Axial CT imaging of the abdomen and pelvis was performed without intravenous contrast. Multiplanar reformats were generated. One or more dose reduction techniques were used on this CT: automated exposure control, adjustment of the mAs and/or kVp according to patient size, and iterative reconstruction techniques. The specific techniques used on this CT exam have been documented in the patient's electronic medical record. Digital Imaging and Communications in Medicine (DICOM) format image data are available to nonaffiliated external healthcare facilities or entities on a secure, media free, reciprocally searchable basis with patient authorization for at least a 12-month period after this study. _______________ FINDINGS: LOWER CHEST: Streaky areas of atelectasis without evidence of consolidation or pleural effusion. Normal cardiac size. No pericardial effusion. LIVER, BILIARY: Liver is normal. No biliary dilation. Gallbladder is unremarkable. PANCREAS: Normal unenhanced appearance.  SPLEEN: Normal. ADRENALS: Normal. KIDNEYS/URETERS/BLADDER: No evidence of hydronephrosis involving either kidney. No nephrolithiasis. Urinary bladder unremarkable. PELVIC ORGANS: Unremarkable. VASCULATURE: Unremarkable LYMPH NODES: No enlarged lymph nodes. GASTROINTESTINAL TRACT: There is mild wall thickening involving the proximal portion of the sigmoid colon with accompanying right colonic stranding and edema. No evidence of an organized or drainable pericolonic fluid collection. No gross free intraperitoneal perforation. Normal appendix. BONES: No acute or aggressive osseous abnormalities identified. OTHER: Rectus diastases involving the anterior abdominal wall _______________     1. Mild proximal sigmoid colonic wall thickening and inflammatory changes suggesting an infectious or inflammatory colitis.   > No evidence of an organized or drainable fluid collection.   > No gross free intraperitoneal perforation. 2. No evidence of urolithiasis or obstructive uropathy. ECHO ADULT COMPLETE    Result Date: 1/1/2022    Left Ventricle: Left ventricle size is normal. Mildly increased wall thickness. Normal wall motion. Normal left ventricular systolic function with a visually estimated EF of 60 - 65%. Grade II diastolic dysfunction with increased LAP.   Mitral Valve: Mildly thickened leaflets. Mild transvalvular regurgitation. No stenosis.   Tricuspid Valve: Tricuspid regurgitation is inadequate for estimation of right ventricular systolic pressure.   IVC/SVC: IVC diameter is less than or equal to 21 mm and decreases greater than 50% during inspiration; therefore the estimated right atrial pressure is normal (~3 mmHg).        Simi Odell MD

## 2022-01-07 NOTE — PROGRESS NOTES
Shift Summary:  Patient slept through the night. Denied pain or discomfort. Denied n/v. Denied diarrhea. Patient Vitals for the past 8 hrs:   Temp Pulse Resp BP SpO2   01/07/22 0359 99 °F (37.2 °C) (!) 114 17 (!) 145/84 95 %   01/07/22 0149  (!) 112  (!) 166/98    01/06/22 2246 99.3 °F (37.4 °C) (!) 106 18 (!) 151/87 98 %         0746 - Bedside and Verbal shift change report given to TRENTON Angel RN (oncoming nurse) by Leeanna Thompson (offgoing nurse). Report included the following information SBAR, Kardex, Intake/Output and MAR.

## 2022-01-07 NOTE — PROGRESS NOTES
1942 - Bedside report received from [facility]. Patient in bed. Pain 0/10.     2018 - Patient in bed at this time. IV to [address]  intact and patent. + CMS. Pt A & O x 4. LS clear, on RA. Abdomen soft, NT and ND. + BS to all 4 quadrants. Reports nausea. Reglan given. Pain 0/10. Call light within reach.

## 2022-01-07 NOTE — PROGRESS NOTES
6801  Bedside and verbal shift change report given to [address] [place] [address] by Melissa Laureano RN Report included SBAR, kardex, MAR, and recent results. 46  Notified Dr. Alexander Myers that patient intake is very poor d/t nausea. Notified that patient is now using a walker d/t generalized weakness. 65  Notified Dr. Alexander Myers /90 before pain medication and 164/85 after pain resolved. 18  Paged Dr. Columba Woodson. /100 manually. Patient states pain is very mild. No SOB/chest pain. No nausea. 80  Dr. Columba Woodson notified. States she is placing orders. 200  Notified Dr. Columba Woodson /86--stated to still give metoprolol. 0003  Bedside and verbal shift change report given to [facility] by Ivana Reynolds RN. Report included SBAR, kardex, MAR, and recent results. Nausea controlled at this time. Po intake still poor after encouragement.

## 2022-01-07 NOTE — PROGRESS NOTES
Assessment:     Kaushal Argueta is a 42y.o. year old male with h/o  DM, HTN,Neuropathy admitted for colitis, reportedly had more than 10 episodes of watery diarrhea on 12/27 and h/o loose stool/diarrhoea for 1 yr and uncontrolled DM.      Baseline serum creatinine on 119/21 was 0.94 mg/dl on admission it was 3.47 mg/dl and is 3.28 mg/dl on 11/22   Ct abdomen and pelvis does not have evidence of hydronephrosis and obstruction . He has decent urine output overnight . Hemodynamically stable without evidence of hypotension. No evidence of proteinuria and hematuria on urine analysis . Does not have clinically significant proteinuria so less likely glomerular disease   So based on presentation Acute renal failure most likely is  pre renal volume depletion due to watery diarrhoea vs toxin related ATN due to infection/colitis         SUBJECTIVE:   -saw him after half part of  stress test , he says he still has abdominal discomfort and chest pain . Diarrhea improved . Denied problems in urinating   No nausea and vomiting           PLAN:-   Continue current supportive care  Good urine out put will monitor     Rest of the management per primary team     Subjective:   PT feels better, less pain      Review of Systems  Negative for  SOB        Blood pressure (!) 157/93, pulse (!) 109, temperature 99.2 °F (37.3 °C), resp. rate 17, height 5' 6\" (1.676 m), weight 97.1 kg (214 lb), SpO2 95 %. NAD, Conversant    Psychicatric: good judgment and insights, alert and oreinted        Intake/Output Summary (Last 24 hours) at 1/7/2022 1033  Last data filed at 1/7/2022 0937  Gross per 24 hour   Intake 1050 ml   Output 3950 ml   Net -2900 ml      No results for input(s): WBC in the last 72 hours.     No lab exists for component: HEMOGLOBIN, PLATELET  Lab Results   Component Value Date/Time    Sodium 141 01/07/2022 05:36 AM    Potassium 3.9 01/07/2022 05:36 AM    Chloride 110 01/07/2022 05:36 AM    CO2 20 (L) 01/07/2022 05:36 AM    Anion gap 11 01/07/2022 05:36 AM    Glucose 170 (H) 01/07/2022 05:36 AM    BUN 33 (H) 01/07/2022 05:36 AM    Creatinine 3.17 (H) 01/07/2022 05:36 AM    BUN/Creatinine ratio 10 (L) 01/07/2022 05:36 AM    GFR est AA 26 (L) 01/07/2022 05:36 AM    GFR est non-AA 21 (L) 01/07/2022 05:36 AM    Calcium 8.3 (L) 01/07/2022 05:36 AM        Current Facility-Administered Medications   Medication Dose Route Frequency Provider Last Rate Last Admin    insulin glargine (LANTUS) injection 5 Units  5 Units SubCUTAneous DAILY Hermelindo Burgos MD   5 Units at 01/07/22 0932    hydrALAZINE (APRESOLINE) 20 mg/mL injection 10 mg  10 mg IntraVENous Q6H Hermelindo Burgos MD   10 mg at 01/07/22 0931    cetirizine (ZYRTEC) tablet 5 mg  5 mg Oral DAILY Hermelindo Burgos MD   5 mg at 01/07/22 0929    metroNIDAZOLE (FLAGYL) IVPB premix 500 mg  500 mg IntraVENous Q8H Hermelindo Bugros  mL/hr at 01/07/22 0534 500 mg at 01/07/22 0534    ciprofloxacin (CIPRO) 200 mg in D5W IVPB (premix)  200 mg IntraVENous Q12H Hermelindo Burgos  mL/hr at 01/07/22 0932 200 mg at 01/07/22 0932    metoclopramide HCl (REGLAN) injection 2.5 mg  2.5 mg IntraVENous Dandre Thompson MD   2.5 mg at 01/07/22 0640    sodium bicarbonate tablet 1,300 mg  1,300 mg Oral BID Donald Enriquez MD   1,300 mg at 01/06/22 0303    [Held by provider] hydrALAZINE (APRESOLINE) tablet 75 mg  75 mg Oral TID Hermelindo Burgos MD   75 mg at 01/06/22 0535    pantoprazole (PROTONIX) tablet 40 mg  40 mg Oral Q12H Nilsa Soria MD   40 mg at 01/07/22 0929    morphine injection 2 mg  2 mg IntraVENous Q6H PRN Colonel Ridge DO   2 mg at 01/06/22 1031    amLODIPine (NORVASC) tablet 10 mg  10 mg Oral DAILY Colonel Ridge DO   10 mg at 01/07/22 1581    insulin lispro (HUMALOG) injection   SubCUTAneous AC&HS Nilsa Soria MD   2 Units at 01/07/22 0931    glucose chewable tablet 16 g  4 Tablet Oral PRN Nilsa Soria MD        glucagon (GLUCAGEN) injection 1 mg  1 mg IntraMUSCular PRN Nilsa Soria MD  dextrose (D50W) injection syrg 12.5-25 g  25-50 mL IntraVENous PRN Sammie Soria MD        0.9% sodium chloride infusion  75 mL/hr IntraVENous CONTINUOUS Donald Enriquez MD 75 mL/hr at 01/05/22 1339 75 mL/hr at 01/05/22 1339    sodium chloride (NS) flush 5-40 mL  5-40 mL IntraVENous Q8H Sammie Soria MD   10 mL at 01/05/22 1823    sodium chloride (NS) flush 5-40 mL  5-40 mL IntraVENous PRN Sammie Soria MD        acetaminophen (TYLENOL) tablet 650 mg  650 mg Oral Q6H PRN Sammie Soria MD   650 mg at 01/02/22 1827    Or    acetaminophen (TYLENOL) suppository 650 mg  650 mg Rectal Q6H PRN Sammie Soria MD        polyethylene glycol (MIRALAX) packet 17 g  17 g Oral DAILY PRN Sammie Soria MD        ondansetron (ZOFRAN ODT) tablet 4 mg  4 mg Oral Q8H PRN Sammie Soria MD        Or    ondansetron (ZOFRAN) injection 4 mg  4 mg IntraVENous Q6H PRN Sammie Soria MD   4 mg at 01/07/22 0931    enoxaparin (LOVENOX) injection 30 mg  30 mg SubCUTAneous DAILY Sammie Soria MD   30 mg at 01/06/22 1152    gabapentin (NEURONTIN) capsule 300 mg  300 mg Oral BID Sammie Soria MD   300 mg at 01/06/22 0358     Total time spent 31 minutes

## 2022-01-08 LAB
ANION GAP SERPL CALC-SCNC: 7 MMOL/L (ref 3–18)
BUN SERPL-MCNC: 29 MG/DL (ref 7–18)
BUN/CREAT SERPL: 9 (ref 12–20)
CALCIUM SERPL-MCNC: 8.4 MG/DL (ref 8.5–10.1)
CHLORIDE SERPL-SCNC: 111 MMOL/L (ref 100–111)
CO2 SERPL-SCNC: 24 MMOL/L (ref 21–32)
CREAT SERPL-MCNC: 3.06 MG/DL (ref 0.6–1.3)
GLUCOSE BLD STRIP.AUTO-MCNC: 106 MG/DL (ref 70–110)
GLUCOSE BLD STRIP.AUTO-MCNC: 133 MG/DL (ref 70–110)
GLUCOSE BLD STRIP.AUTO-MCNC: 158 MG/DL (ref 70–110)
GLUCOSE BLD STRIP.AUTO-MCNC: 168 MG/DL (ref 70–110)
GLUCOSE SERPL-MCNC: 146 MG/DL (ref 74–99)
MAGNESIUM SERPL-MCNC: 1.9 MG/DL (ref 1.6–2.6)
POTASSIUM SERPL-SCNC: 3.8 MMOL/L (ref 3.5–5.5)
SODIUM SERPL-SCNC: 142 MMOL/L (ref 136–145)

## 2022-01-08 PROCEDURE — 74011636637 HC RX REV CODE- 636/637: Performed by: HOSPITALIST

## 2022-01-08 PROCEDURE — 80048 BASIC METABOLIC PNL TOTAL CA: CPT

## 2022-01-08 PROCEDURE — 36415 COLL VENOUS BLD VENIPUNCTURE: CPT

## 2022-01-08 PROCEDURE — 74011000250 HC RX REV CODE- 250: Performed by: INTERNAL MEDICINE

## 2022-01-08 PROCEDURE — 65270000029 HC RM PRIVATE

## 2022-01-08 PROCEDURE — 74011250636 HC RX REV CODE- 250/636: Performed by: INTERNAL MEDICINE

## 2022-01-08 PROCEDURE — 82962 GLUCOSE BLOOD TEST: CPT

## 2022-01-08 PROCEDURE — 83735 ASSAY OF MAGNESIUM: CPT

## 2022-01-08 PROCEDURE — 74011250636 HC RX REV CODE- 250/636: Performed by: HOSPITALIST

## 2022-01-08 PROCEDURE — 74011250637 HC RX REV CODE- 250/637: Performed by: HOSPITALIST

## 2022-01-08 PROCEDURE — 74011636637 HC RX REV CODE- 636/637: Performed by: INTERNAL MEDICINE

## 2022-01-08 PROCEDURE — 74011250636 HC RX REV CODE- 250/636: Performed by: STUDENT IN AN ORGANIZED HEALTH CARE EDUCATION/TRAINING PROGRAM

## 2022-01-08 PROCEDURE — 74011250637 HC RX REV CODE- 250/637: Performed by: INTERNAL MEDICINE

## 2022-01-08 RX ADMIN — HYDRALAZINE HYDROCHLORIDE 10 MG: 20 INJECTION INTRAMUSCULAR; INTRAVENOUS at 15:23

## 2022-01-08 RX ADMIN — HYDRALAZINE HYDROCHLORIDE 10 MG: 20 INJECTION INTRAMUSCULAR; INTRAVENOUS at 09:32

## 2022-01-08 RX ADMIN — METRONIDAZOLE 500 MG: 500 INJECTION, SOLUTION INTRAVENOUS at 22:36

## 2022-01-08 RX ADMIN — METOCLOPRAMIDE HYDROCHLORIDE 2.5 MG: 5 INJECTION INTRAMUSCULAR; INTRAVENOUS at 18:11

## 2022-01-08 RX ADMIN — METRONIDAZOLE 500 MG: 500 INJECTION, SOLUTION INTRAVENOUS at 05:06

## 2022-01-08 RX ADMIN — PANTOPRAZOLE SODIUM 40 MG: 40 TABLET, DELAYED RELEASE ORAL at 08:54

## 2022-01-08 RX ADMIN — METOCLOPRAMIDE HYDROCHLORIDE 2.5 MG: 5 INJECTION INTRAMUSCULAR; INTRAVENOUS at 15:23

## 2022-01-08 RX ADMIN — Medication 2 UNITS: at 08:51

## 2022-01-08 RX ADMIN — METOPROLOL TARTRATE 5 MG: 5 INJECTION INTRAVENOUS at 17:19

## 2022-01-08 RX ADMIN — ENOXAPARIN SODIUM 30 MG: 100 INJECTION SUBCUTANEOUS at 11:36

## 2022-01-08 RX ADMIN — SODIUM CHLORIDE, PRESERVATIVE FREE 10 ML: 5 INJECTION INTRAVENOUS at 05:06

## 2022-01-08 RX ADMIN — SODIUM CHLORIDE, PRESERVATIVE FREE 10 ML: 5 INJECTION INTRAVENOUS at 21:18

## 2022-01-08 RX ADMIN — METOPROLOL TARTRATE 5 MG: 5 INJECTION INTRAVENOUS at 01:07

## 2022-01-08 RX ADMIN — INSULIN GLARGINE 5 UNITS: 100 INJECTION, SOLUTION SUBCUTANEOUS at 08:53

## 2022-01-08 RX ADMIN — HYDRALAZINE HYDROCHLORIDE 10 MG: 20 INJECTION INTRAMUSCULAR; INTRAVENOUS at 04:04

## 2022-01-08 RX ADMIN — HYDRALAZINE HYDROCHLORIDE 10 MG: 20 INJECTION INTRAMUSCULAR; INTRAVENOUS at 21:23

## 2022-01-08 RX ADMIN — MORPHINE SULFATE 2 MG: 2 INJECTION, SOLUTION INTRAMUSCULAR; INTRAVENOUS at 11:36

## 2022-01-08 RX ADMIN — METOPROLOL TARTRATE 5 MG: 5 INJECTION INTRAVENOUS at 06:31

## 2022-01-08 RX ADMIN — METOPROLOL TARTRATE 5 MG: 5 INJECTION INTRAVENOUS at 23:42

## 2022-01-08 RX ADMIN — METOPROLOL TARTRATE 5 MG: 5 INJECTION INTRAVENOUS at 11:36

## 2022-01-08 RX ADMIN — METRONIDAZOLE 500 MG: 500 INJECTION, SOLUTION INTRAVENOUS at 15:23

## 2022-01-08 RX ADMIN — CIPROFLOXACIN 200 MG: 2 INJECTION, SOLUTION INTRAVENOUS at 08:55

## 2022-01-08 RX ADMIN — METOCLOPRAMIDE HYDROCHLORIDE 2.5 MG: 5 INJECTION INTRAMUSCULAR; INTRAVENOUS at 06:33

## 2022-01-08 RX ADMIN — SODIUM CHLORIDE 75 ML/HR: 9 INJECTION, SOLUTION INTRAVENOUS at 21:26

## 2022-01-08 RX ADMIN — PANTOPRAZOLE SODIUM 40 MG: 40 TABLET, DELAYED RELEASE ORAL at 21:19

## 2022-01-08 RX ADMIN — CIPROFLOXACIN 200 MG: 2 INJECTION, SOLUTION INTRAVENOUS at 21:25

## 2022-01-08 RX ADMIN — SODIUM CHLORIDE, PRESERVATIVE FREE 10 ML: 5 INJECTION INTRAVENOUS at 14:00

## 2022-01-08 NOTE — PROGRESS NOTES
Hospitalist Progress Note    Patient: Florencia Current MRN: [id number]  CSN: [id number]    YOB: 1975  Age: 42 y.o. Sex: male    DOA: 12/30/2021 LOS:  LOS: 9 days                Assessment and Plan:    Principal Problem:    Colitis (12/30/2021)    Active Problems:    Diarrhea (12/30/2021)      MILADIS (acute kidney injury) ([address] (12/30/2021)      DM (diabetes mellitus) ([address] (12/30/2021)      Nausea & vomiting (12/30/2021)      Diabetes mellitus ([address] (12/30/2021)      Generalized abdominal pain (12/30/2021)      Acute kidney injury ([address] (12/30/2021)      Chest pain (1/6/2022)        Nausea/Vomiting:  refusing all his oral medications, refusing all meds. Will take some pureed foods  Suspect this is gastroparesis    MILADIS: this is stable      Chief complaint:  colitis miladis , dm ,       Subjective:    Nauseated and lying on side. No acute distress    Review of systems:    General: No fevers or chills. Cardiovascular: No chest pain or pressure. No palpitations. Pulmonary: No shortness of breath. Gastrointestinal: No nausea, vomiting. Objective:    Vital signs/Intake and Output:  Visit Vitals  BP (!) 155/90   Pulse (!) 101   Temp 99.6 °F (37.6 °C)   Resp 20   Ht 5' 6\" (1.676 m)   Wt 97.8 kg (215 lb 11.2 oz)   SpO2 96%   BMI 34.81 kg/m²     Current Shift:  No intake/output data recorded. Last three shifts:  01/06 1901 - 01/08 0700  In: 1040 [P.O.:140; I.V.:900]  Out: 3050 [Urine:2800]    Physical Exam:  General: NAD, AAOx3. Non-toxic. HEENT: NC/AT. PERRLA, EOMI.  MMM. Lungs: Nml inspection. CTA B/L. No wheezing, rales or rhonchi. Heart:  S1S2 RRR,  PMI mid 5th IC space. No M/RG. Abdomen: Soft, NT/ND.  BS+. No peritoneal signs. Extremities: No C/C/E. Psych:   Nml affect. Neurologic:  2-12 intact. Strength 5/5 throughout.   Sensation symmetrical.          Labs: Results:       Chemistry Recent Labs     01/08/22  0155 01/07/22  0536 01/06/22  0215   * 170* 238*    141 138   K 3.8 3.9 4.0    110 109   CO2 24 20* 21   BUN 29* 33* 39*   CREA 3.06* 3.17* 3.29*   CA 8.4* 8.3* 8.3*   AGAP 7 11 8   BUCR 9* 10* 12      CBC w/Diff No results for input(s): WBC, RBC, HGB, HCT, PLT, GRANS, LYMPH, EOS, HGBEXT, HCTEXT, PLTEXT in the last 72 hours. Cardiac Enzymes No results for input(s): CPK, CKND1, PARTH in the last 72 hours. No lab exists for component: CKRMB, TROIP   Coagulation No results for input(s): PTP, INR, APTT, INREXT in the last 72 hours. Lipid Panel No results found for: CHOL, CHOLPOCT, CHOLX, CHLST, CHOLV, 230385, HDL, HDLP, LDL, LDLC, DLDLP, [id number], VLDLC, VLDL, TGLX, TRIGL, TRIGP, TGLPOCT, CHHD, CHHDX   BNP No results for input(s): BNPP in the last 72 hours. Liver Enzymes No results for input(s): TP, ALB, TBIL, AP in the last 72 hours.     No lab exists for component: SGOT, GPT, DBIL   Thyroid Studies No results found for: T4, T3U, TSH, TSHEXT     Procedures/imaging: see electronic medical records for all procedures/Xrays and details which were not copied into this note but were reviewed prior to creation of Plan

## 2022-01-08 NOTE — PROGRESS NOTES
Assessment:       Erika Payton is a 42y.o. year old male with h/o  DM, HTN,Neuropathy admitted for colitis, reportedly had more than 10 episodes of watery diarrhea on 12/27 and h/o loose stool/diarrhoea for 1 yr and uncontrolled DM.      Baseline serum creatinine on 119/21 was 0.94 mg/dl on admission it was 3.47 mg/dl and is 3.28 mg/dl on 11/22   Ct abdomen and pelvis does not have evidence of hydronephrosis and obstruction . He has decent urine output overnight . Hemodynamically stable without evidence of hypotension. No evidence of proteinuria and hematuria on urine analysis . Does not have clinically significant proteinuria so less likely glomerular disease   So based on presentation Acute renal failure most likely is  pre renal volume depletion due to watery diarrhoea vs toxin related ATN due to infection/colitis         SUBJECTIVE:   -saw him after half part of  stress test , he says he still has abdominal discomfort and chest pain . Diarrhea improved . 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 No new symptoms or issues. Review of Systems:  Negative for Edema, SOB        Blood pressure (!) 155/90, pulse (!) 101, temperature 99.6 °F (37.6 °C), resp. rate 20, height 5' 6\" (1.676 m), weight 97.8 kg (215 lb 11.2 oz), SpO2 96 %. awake and alert   NAD      Intake/Output Summary (Last 24 hours) at 1/8/2022 1234  Last data filed at 1/8/2022 0320  Gross per 24 hour   Intake 1000 ml   Output 1000 ml   Net 0 ml      No results for input(s): WBC in the last 72 hours.     No lab exists for component: HEMOGLOBIN, PLATELET  Lab Results   Component Value Date/Time    Sodium 142 01/08/2022 01:55 AM    Potassium 3.8 01/08/2022 01:55 AM Chloride 111 01/08/2022 01:55 AM    CO2 24 01/08/2022 01:55 AM    Anion gap 7 01/08/2022 01:55 AM    Glucose 146 (H) 01/08/2022 01:55 AM    BUN 29 (H) 01/08/2022 01:55 AM    Creatinine 3.06 (H) 01/08/2022 01:55 AM    BUN/Creatinine ratio 9 (L) 01/08/2022 01:55 AM    GFR est AA 27 (L) 01/08/2022 01:55 AM    GFR est non-AA 22 (L) 01/08/2022 01:55 AM    Calcium 8.4 (L) 01/08/2022 01:55 AM        Current Facility-Administered Medications   Medication Dose Route Frequency Provider Last Rate Last Admin    metoprolol (LOPRESSOR) injection 5 mg  5 mg IntraVENous Q6H Blake Soria MD   5 mg at 01/08/22 1136    insulin glargine (LANTUS) injection 5 Units  5 Units SubCUTAneous DAILY Sylvain North MD   5 Units at 01/08/22 0853    hydrALAZINE (APRESOLINE) 20 mg/mL injection 10 mg  10 mg IntraVENous Q6H Sylvain North MD   10 mg at 01/08/22 0932    cetirizine (ZYRTEC) tablet 5 mg  5 mg Oral DAILY Sylvain North MD   5 mg at 01/07/22 0929    metroNIDAZOLE (FLAGYL) IVPB premix 500 mg  500 mg IntraVENous Q8H Sylvain North  mL/hr at 01/08/22 0506 500 mg at 01/08/22 0506    ciprofloxacin (CIPRO) 200 mg in D5W IVPB (premix)  200 mg IntraVENous Q12H Sylvain North  mL/hr at 01/08/22 0855 200 mg at 01/08/22 0855    metoclopramide HCl (REGLAN) injection 2.5 mg  2.5 mg IntraVENous Xavier Sandoval MD   2.5 mg at 01/08/22 2248    sodium bicarbonate tablet 1,300 mg  1,300 mg Oral BID Donald Enriquez MD   1,300 mg at 01/06/22 0754    [Held by provider] hydrALAZINE (APRESOLINE) tablet 75 mg  75 mg Oral TID Sylvain North MD   75 mg at 01/06/22 0535    pantoprazole (PROTONIX) tablet 40 mg  40 mg Oral Q12H Blake Soria MD   40 mg at 01/08/22 0854    morphine injection 2 mg  2 mg IntraVENous Q6H PRN Bryant Roe DO   2 mg at 01/08/22 1136    amLODIPine (NORVASC) tablet 10 mg  10 mg Oral DAILY Bryant Roe DO   10 mg at 01/07/22 4910    insulin lispro (HUMALOG) injection   SubCUTAneous AC&HS Blake Soria MD   2 Units at 01/08/22 0851    glucose chewable tablet 16 g  4 Tablet Oral PRN Lisa Soria MD        glucagon (GLUCAGEN) injection 1 mg  1 mg IntraMUSCular PRN Lisa Soria MD        dextrose (D50W) injection syrg 12.5-25 g  25-50 mL IntraVENous PRN Lisa Soria MD        0.9% sodium chloride infusion  75 mL/hr IntraVENous CONTINUOUS Donald Enriquez MD 75 mL/hr at 01/07/22 1737 75 mL/hr at 01/07/22 1737    sodium chloride (NS) flush 5-40 mL  5-40 mL IntraVENous Q8H Lisa Soria MD   10 mL at 01/08/22 0506    sodium chloride (NS) flush 5-40 mL  5-40 mL IntraVENous PRN Lisa Soria MD        acetaminophen (TYLENOL) tablet 650 mg  650 mg Oral Q6H PRN Lisa Soria MD   650 mg at 01/02/22 1827    Or    acetaminophen (TYLENOL) suppository 650 mg  650 mg Rectal Q6H PRN Lisa Soria MD        polyethylene glycol (MIRALAX) packet 17 g  17 g Oral DAILY PRN Lisa Soria MD        ondansetron (ZOFRAN ODT) tablet 4 mg  4 mg Oral Q8H PRN Lisa Soria MD        Or    ondansetron (ZOFRAN) injection 4 mg  4 mg IntraVENous Q6H PRN Lisa Soria MD   4 mg at 01/07/22 0931    enoxaparin (LOVENOX) injection 30 mg  30 mg SubCUTAneous DAILY Lisa Soria MD   30 mg at 01/08/22 1136    gabapentin (NEURONTIN) capsule 300 mg  300 mg Oral BID Lisa Soria MD   300 mg at 01/06/22 9114

## 2022-01-08 NOTE — PROGRESS NOTES
D/C plan: Home when medically stable. Pt's wife will transport. Pt's oral intake is poor due to n/v. He has declined his oral meds as well. Pt will need f/u appt w/ PCP and GI when d/c'd. Pt will need to get established w/ a GI as he doesn't currently have one. Care Management Interventions  Mode of Transport at Discharge:  Other (see comment) (Family)  Transition of Care Consult (CM Consult): Discharge Planning  Health Maintenance Reviewed: Yes  Support Systems: Spouse/Significant Other  Confirm Follow Up Transport: Self  The Plan for Transition of Care is Related to the Following Treatment Goals : Home with physician follow up  Discharge Location  Discharge Placement: Home with family assistance

## 2022-01-08 NOTE — PROGRESS NOTES
SBAR received patient laying in bed eyes open no signs of distress. Assessment completed patient refused all oral medications stated it mess with his stomach writer provided education on uses of medications. Writer also encourage patient to try and eat something to maintain strength, he still refused all medications and stated I not going to eat lunch. Writer will updated receiving RN with SBAR. Call light and personal belonging remain within reach.

## 2022-01-09 LAB
ALBUMIN SERPL-MCNC: 2.7 G/DL (ref 3.4–5)
ALBUMIN/GLOB SERPL: 1 {RATIO} (ref 0.8–1.7)
ALP SERPL-CCNC: 65 U/L (ref 45–117)
ALT SERPL-CCNC: 22 U/L (ref 16–61)
ANION GAP SERPL CALC-SCNC: 9 MMOL/L (ref 3–18)
AST SERPL-CCNC: 15 U/L (ref 10–38)
BASOPHILS # BLD: 0 K/UL (ref 0–0.1)
BASOPHILS NFR BLD: 0 % (ref 0–2)
BILIRUB SERPL-MCNC: 0.4 MG/DL (ref 0.2–1)
BUN SERPL-MCNC: 25 MG/DL (ref 7–18)
BUN/CREAT SERPL: 8 (ref 12–20)
CALCIUM SERPL-MCNC: 8.3 MG/DL (ref 8.5–10.1)
CHLORIDE SERPL-SCNC: 110 MMOL/L (ref 100–111)
CO2 SERPL-SCNC: 23 MMOL/L (ref 21–32)
CREAT SERPL-MCNC: 2.97 MG/DL (ref 0.6–1.3)
DIFFERENTIAL METHOD BLD: ABNORMAL
EOSINOPHIL # BLD: 0.3 K/UL (ref 0–0.4)
EOSINOPHIL NFR BLD: 3 % (ref 0–5)
ERYTHROCYTE [DISTWIDTH] IN BLOOD BY AUTOMATED COUNT: 12.5 % (ref 11.6–14.5)
GLOBULIN SER CALC-MCNC: 2.7 G/DL (ref 2–4)
GLUCOSE BLD STRIP.AUTO-MCNC: 117 MG/DL (ref 70–110)
GLUCOSE BLD STRIP.AUTO-MCNC: 129 MG/DL (ref 70–110)
GLUCOSE BLD STRIP.AUTO-MCNC: 135 MG/DL (ref 70–110)
GLUCOSE BLD STRIP.AUTO-MCNC: 156 MG/DL (ref 70–110)
GLUCOSE BLD STRIP.AUTO-MCNC: 207 MG/DL (ref 70–110)
GLUCOSE SERPL-MCNC: 125 MG/DL (ref 74–99)
HCT VFR BLD AUTO: 33.6 % (ref 36–48)
HGB BLD-MCNC: 11.3 G/DL (ref 13–16)
IMM GRANULOCYTES # BLD AUTO: 0 K/UL (ref 0–0.04)
IMM GRANULOCYTES NFR BLD AUTO: 0 % (ref 0–0.5)
LYMPHOCYTES # BLD: 1.9 K/UL (ref 0.9–3.6)
LYMPHOCYTES NFR BLD: 19 % (ref 21–52)
MAGNESIUM SERPL-MCNC: 2 MG/DL (ref 1.6–2.6)
MCH RBC QN AUTO: 26.4 PG (ref 24–34)
MCHC RBC AUTO-ENTMCNC: 33.6 G/DL (ref 31–37)
MCV RBC AUTO: 78.5 FL (ref 78–100)
MONOCYTES # BLD: 1.1 K/UL (ref 0.05–1.2)
MONOCYTES NFR BLD: 11 % (ref 3–10)
NEUTS SEG # BLD: 6.9 K/UL (ref 1.8–8)
NEUTS SEG NFR BLD: 67 % (ref 40–73)
NRBC # BLD: 0 K/UL (ref 0–0.01)
NRBC BLD-RTO: 0 PER 100 WBC
PLATELET # BLD AUTO: 334 K/UL (ref 135–420)
PMV BLD AUTO: 9.1 FL (ref 9.2–11.8)
POTASSIUM SERPL-SCNC: 3.9 MMOL/L (ref 3.5–5.5)
PROT SERPL-MCNC: 5.4 G/DL (ref 6.4–8.2)
RBC # BLD AUTO: 4.28 M/UL (ref 4.35–5.65)
SODIUM SERPL-SCNC: 142 MMOL/L (ref 136–145)
WBC # BLD AUTO: 10.3 K/UL (ref 4.6–13.2)

## 2022-01-09 PROCEDURE — 74011250636 HC RX REV CODE- 250/636: Performed by: STUDENT IN AN ORGANIZED HEALTH CARE EDUCATION/TRAINING PROGRAM

## 2022-01-09 PROCEDURE — 74011000250 HC RX REV CODE- 250: Performed by: INTERNAL MEDICINE

## 2022-01-09 PROCEDURE — 74011636637 HC RX REV CODE- 636/637: Performed by: INTERNAL MEDICINE

## 2022-01-09 PROCEDURE — 36415 COLL VENOUS BLD VENIPUNCTURE: CPT

## 2022-01-09 PROCEDURE — 74011250637 HC RX REV CODE- 250/637: Performed by: STUDENT IN AN ORGANIZED HEALTH CARE EDUCATION/TRAINING PROGRAM

## 2022-01-09 PROCEDURE — 74011250637 HC RX REV CODE- 250/637: Performed by: INTERNAL MEDICINE

## 2022-01-09 PROCEDURE — 85025 COMPLETE CBC W/AUTO DIFF WBC: CPT

## 2022-01-09 PROCEDURE — 74011636637 HC RX REV CODE- 636/637: Performed by: HOSPITALIST

## 2022-01-09 PROCEDURE — 83735 ASSAY OF MAGNESIUM: CPT

## 2022-01-09 PROCEDURE — 74011250637 HC RX REV CODE- 250/637: Performed by: HOSPITALIST

## 2022-01-09 PROCEDURE — 80053 COMPREHEN METABOLIC PANEL: CPT

## 2022-01-09 PROCEDURE — 74011250636 HC RX REV CODE- 250/636: Performed by: HOSPITALIST

## 2022-01-09 PROCEDURE — 82962 GLUCOSE BLOOD TEST: CPT

## 2022-01-09 PROCEDURE — 74011250636 HC RX REV CODE- 250/636: Performed by: INTERNAL MEDICINE

## 2022-01-09 PROCEDURE — 65270000029 HC RM PRIVATE

## 2022-01-09 RX ADMIN — CETIRIZINE HYDROCHLORIDE 5 MG: 10 TABLET, FILM COATED ORAL at 09:30

## 2022-01-09 RX ADMIN — CIPROFLOXACIN 200 MG: 2 INJECTION, SOLUTION INTRAVENOUS at 09:30

## 2022-01-09 RX ADMIN — SODIUM CHLORIDE, PRESERVATIVE FREE 10 ML: 5 INJECTION INTRAVENOUS at 06:35

## 2022-01-09 RX ADMIN — METRONIDAZOLE 500 MG: 500 INJECTION, SOLUTION INTRAVENOUS at 13:00

## 2022-01-09 RX ADMIN — SODIUM CHLORIDE, PRESERVATIVE FREE 10 ML: 5 INJECTION INTRAVENOUS at 22:58

## 2022-01-09 RX ADMIN — HYDRALAZINE HYDROCHLORIDE 10 MG: 20 INJECTION INTRAMUSCULAR; INTRAVENOUS at 17:48

## 2022-01-09 RX ADMIN — GABAPENTIN 300 MG: 300 CAPSULE ORAL at 23:00

## 2022-01-09 RX ADMIN — METOPROLOL TARTRATE 5 MG: 5 INJECTION INTRAVENOUS at 06:27

## 2022-01-09 RX ADMIN — METOCLOPRAMIDE HYDROCHLORIDE 2.5 MG: 5 INJECTION INTRAMUSCULAR; INTRAVENOUS at 06:33

## 2022-01-09 RX ADMIN — METOPROLOL TARTRATE 5 MG: 5 INJECTION INTRAVENOUS at 17:54

## 2022-01-09 RX ADMIN — HYDRALAZINE HYDROCHLORIDE 10 MG: 20 INJECTION INTRAMUSCULAR; INTRAVENOUS at 22:53

## 2022-01-09 RX ADMIN — SODIUM BICARBONATE 1300 MG: 650 TABLET ORAL at 23:00

## 2022-01-09 RX ADMIN — Medication 2 UNITS: at 22:03

## 2022-01-09 RX ADMIN — HYDRALAZINE HYDROCHLORIDE 10 MG: 20 INJECTION INTRAMUSCULAR; INTRAVENOUS at 03:57

## 2022-01-09 RX ADMIN — Medication 4 UNITS: at 11:37

## 2022-01-09 RX ADMIN — SODIUM BICARBONATE 1300 MG: 650 TABLET ORAL at 11:52

## 2022-01-09 RX ADMIN — SODIUM CHLORIDE, PRESERVATIVE FREE 10 ML: 5 INJECTION INTRAVENOUS at 14:00

## 2022-01-09 RX ADMIN — GABAPENTIN 300 MG: 300 CAPSULE ORAL at 11:52

## 2022-01-09 RX ADMIN — PANTOPRAZOLE SODIUM 40 MG: 40 TABLET, DELAYED RELEASE ORAL at 09:29

## 2022-01-09 RX ADMIN — METRONIDAZOLE 500 MG: 500 INJECTION, SOLUTION INTRAVENOUS at 04:07

## 2022-01-09 RX ADMIN — ONDANSETRON 4 MG: 2 INJECTION INTRAMUSCULAR; INTRAVENOUS at 04:01

## 2022-01-09 RX ADMIN — METRONIDAZOLE 500 MG: 500 INJECTION, SOLUTION INTRAVENOUS at 20:46

## 2022-01-09 RX ADMIN — CIPROFLOXACIN 200 MG: 2 INJECTION, SOLUTION INTRAVENOUS at 22:03

## 2022-01-09 RX ADMIN — SODIUM CHLORIDE 75 ML/HR: 9 INJECTION, SOLUTION INTRAVENOUS at 04:03

## 2022-01-09 RX ADMIN — ENOXAPARIN SODIUM 30 MG: 100 INJECTION SUBCUTANEOUS at 12:00

## 2022-01-09 RX ADMIN — METOCLOPRAMIDE HYDROCHLORIDE 2.5 MG: 5 INJECTION INTRAMUSCULAR; INTRAVENOUS at 11:41

## 2022-01-09 RX ADMIN — HYDRALAZINE HYDROCHLORIDE 10 MG: 20 INJECTION INTRAMUSCULAR; INTRAVENOUS at 11:34

## 2022-01-09 RX ADMIN — SODIUM CHLORIDE 75 ML/HR: 9 INJECTION, SOLUTION INTRAVENOUS at 20:44

## 2022-01-09 RX ADMIN — AMLODIPINE BESYLATE 10 MG: 5 TABLET ORAL at 09:29

## 2022-01-09 RX ADMIN — INSULIN GLARGINE 5 UNITS: 100 INJECTION, SOLUTION SUBCUTANEOUS at 09:28

## 2022-01-09 RX ADMIN — METOPROLOL TARTRATE 5 MG: 5 INJECTION INTRAVENOUS at 11:52

## 2022-01-09 RX ADMIN — PANTOPRAZOLE SODIUM 40 MG: 40 TABLET, DELAYED RELEASE ORAL at 20:42

## 2022-01-09 RX ADMIN — METOCLOPRAMIDE HYDROCHLORIDE 2.5 MG: 5 INJECTION INTRAMUSCULAR; INTRAVENOUS at 17:52

## 2022-01-09 NOTE — PROGRESS NOTES
Hospitalist Progress Note    Patient: Madisyn Rivera MRN: [id number]  CSN: [id number]    YOB: 1975  Age: 42 y.o. Sex: male    DOA: 12/30/2021 LOS:  LOS: 10 days                Assessment and Plan:    Principal Problem:    Colitis (12/30/2021)    Active Problems:    Diarrhea (12/30/2021)      EMANUEL (acute kidney injury) ([address] (12/30/2021)      DM (diabetes mellitus) ([address] (12/30/2021)      Nausea & vomiting (12/30/2021)      Diabetes mellitus ([address] (12/30/2021)      Generalized abdominal pain (12/30/2021)      Acute kidney injury ([address] (12/30/2021)      Chest pain (1/6/2022)             Nausea/Vomiting:  refusing all his oral medications, refusing all meds. Will take some pureed foods  Which is going better  Suspect this is gastroparesis     EMANUEL: this is stable and followed by renal        Chief complaint:  colitis emanuel , dm ,       Subjective:    He says pureed food has gone better      Review of systems:    General: No fevers or chills. Cardiovascular: No chest pain or pressure. No palpitations. Pulmonary: No shortness of breath. Gastrointestinal: No nausea, vomiting. Objective:    Vital signs/Intake and Output:  Visit Vitals  BP (!) 165/91   Pulse 98   Temp 98.2 °F (36.8 °C)   Resp 17   Ht 5' 6\" (1.676 m)   Wt 97.8 kg (215 lb 11.2 oz)   SpO2 96%   BMI 34.81 kg/m²     Current Shift:  No intake/output data recorded. Last three shifts:  01/07 1901 - 01/09 0700  In: -   Out: 1800 [Urine:1700]    Physical Exam:  General: NAD, AAOx3. Non-toxic. HEENT: NC/AT. PERRLA, EOMI.  MMM. Lungs: Nml inspection. CTA B/L. No wheezing, rales or rhonchi. Heart:  S1S2 RRR,  PMI mid 5th IC space. No M/RG. Abdomen: Soft, NT/ND.  BS+. No peritoneal signs. Extremities: No C/C/E. Psych:   Nml affect. Neurologic:  2-12 intact. Strength 5/5 throughout.   Sensation symmetrical.          Labs: Results:       Chemistry Recent Labs     01/09/22  0215 01/08/22  0155 01/07/22  0536   * 146* 170*    142 141   K 3.9 3.8 3.9    111 110   CO2 23 24 20*   BUN 25* 29* 33*   CREA 2.97* 3.06* 3.17*   CA 8.3* 8.4* 8.3*   AGAP 9 7 11   BUCR 8* 9* 10*   AP 65  --   --    TP 5.4*  --   --    ALB 2.7*  --   --    GLOB 2.7  --   --    AGRAT 1.0  --   --       CBC w/Diff Recent Labs     01/09/22 0215   WBC 10.3   RBC 4.28*   HGB 11.3*   HCT 33.6*      GRANS 67   LYMPH 19*   EOS 3      Cardiac Enzymes No results for input(s): CPK, CKND1, PARTH in the last 72 hours. No lab exists for component: CKRMB, TROIP   Coagulation No results for input(s): PTP, INR, APTT, INREXT in the last 72 hours. Lipid Panel No results found for: CHOL, CHOLPOCT, CHOLX, CHLST, CHOLV, 597854, HDL, HDLP, LDL, LDLC, DLDLP, 518304, VLDLC, VLDL, TGLX, TRIGL, TRIGP, TGLPOCT, CHHD, CHHDX   BNP No results for input(s): BNPP in the last 72 hours.    Liver Enzymes Recent Labs     01/09/22 0215   TP 5.4*   ALB 2.7*   AP 65      Thyroid Studies No results found for: T4, T3U, TSH, TSHEXT     Procedures/imaging: see electronic medical records for all procedures/Xrays and details which were not copied into this note but were reviewed prior to creation of Plan

## 2022-01-09 NOTE — PROGRESS NOTES
Assessment:       Hailey Wheat is a 42y.o. year old male with h/o  DM, HTN,Neuropathy admitted for colitis, reportedly had more than 10 episodes of watery diarrhea on 12/27 and h/o loose stool/diarrhoea for 1 yr and uncontrolled DM.      Baseline serum creatinine on 119/21 was 0.94 mg/dl on admission it was 3.47 mg/dl and is 3.28 mg/dl on 11/22   Ct abdomen and pelvis does not have evidence of hydronephrosis and obstruction . He has decent urine output overnight . Hemodynamically stable without evidence of hypotension. No evidence of proteinuria and hematuria on urine analysis . Does not have clinically significant proteinuria so less likely glomerular disease   So based on presentation Acute renal failure most likely is  pre renal volume depletion due to watery diarrhoea vs toxin related ATN due to infection/colitis         SUBJECTIVE:   -saw him after half part of  stress test , he says he still has abdominal discomfort and chest pain . Diarrhea improved . 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 No new symptoms or issues.            Review of Systems:  Negative for Edema, SOB        Blood pressure (!) 164/87, pulse 98, temperature 98.3 °F (36.8 °C), resp. rate 17, height 5' 6\" (1.676 m), weight 97.8 kg (215 lb 11.2 oz), SpO2 92 %.       awake and alert   NAD      Intake/Output Summary (Last 24 hours) at 1/9/2022 1127  Last data filed at 1/9/2022 0401  Gross per 24 hour   Intake    Output 800 ml   Net -800 ml      Recent Labs     01/09/22  0215   WBC 10.3     Lab Results   Component Value Date/Time    Sodium 142 01/09/2022 02:15 AM    Potassium 3.9 01/09/2022 02:15 AM    Chloride 110 01/09/2022 02:15 AM    CO2 23 01/09/2022 02:15 AM Anion gap 9 01/09/2022 02:15 AM    Glucose 125 (H) 01/09/2022 02:15 AM    BUN 25 (H) 01/09/2022 02:15 AM    Creatinine 2.97 (H) 01/09/2022 02:15 AM    BUN/Creatinine ratio 8 (L) 01/09/2022 02:15 AM    GFR est AA 28 (L) 01/09/2022 02:15 AM    GFR est non-AA 23 (L) 01/09/2022 02:15 AM    Calcium 8.3 (L) 01/09/2022 02:15 AM        Current Facility-Administered Medications   Medication Dose Route Frequency Provider Last Rate Last Admin    metoprolol (LOPRESSOR) injection 5 mg  5 mg IntraVENous Q6H Damien Soria MD   5 mg at 01/09/22 8282    insulin glargine (LANTUS) injection 5 Units  5 Units SubCUTAneous DAILY Dana Rivera MD   5 Units at 01/09/22 6187    hydrALAZINE (APRESOLINE) 20 mg/mL injection 10 mg  10 mg IntraVENous Q6H Dana Rivera MD   10 mg at 01/09/22 0357    cetirizine (ZYRTEC) tablet 5 mg  5 mg Oral DAILY Dana Rivera MD   5 mg at 01/09/22 0930    metroNIDAZOLE (FLAGYL) IVPB premix 500 mg  500 mg IntraVENous Q8H Dana Rivera  mL/hr at 01/09/22 0407 500 mg at 01/09/22 0407    ciprofloxacin (CIPRO) 200 mg in D5W IVPB (premix)  200 mg IntraVENous Q12H Dana Rivera  mL/hr at 01/09/22 0930 200 mg at 01/09/22 0930    metoclopramide HCl (REGLAN) injection 2.5 mg  2.5 mg IntraVENous Harsha Shin MD   2.5 mg at 01/09/22 2568    sodium bicarbonate tablet 1,300 mg  1,300 mg Oral BID Donald Enriquez MD   1,300 mg at 01/06/22 8478    [Held by provider] hydrALAZINE (APRESOLINE) tablet 75 mg  75 mg Oral TID Dana Rivera MD   75 mg at 01/06/22 0535    pantoprazole (PROTONIX) tablet 40 mg  40 mg Oral Q12H Damien Soria MD   40 mg at 01/09/22 0929    morphine injection 2 mg  2 mg IntraVENous Q6H PRN Maryjo Palomo DO   2 mg at 01/08/22 1136    amLODIPine (NORVASC) tablet 10 mg  10 mg Oral DAILY Maryjo Palomo DO   10 mg at 01/09/22 0929    insulin lispro (HUMALOG) injection   SubCUTAneous AC&HS Damien Soria MD   2 Units at 01/08/22 0851    glucose chewable tablet 16 g  4 Tablet Oral PRN Nakita Soria MD        glucagon (GLUCAGEN) injection 1 mg  1 mg IntraMUSCular PRN Nakita Soria MD        dextrose (D50W) injection syrg 12.5-25 g  25-50 mL IntraVENous PRN Nakita Soria MD        0.9% sodium chloride infusion  75 mL/hr IntraVENous CONTINUOUS Donald Enriquez MD 75 mL/hr at 01/09/22 0403 75 mL/hr at 01/09/22 0403    sodium chloride (NS) flush 5-40 mL  5-40 mL IntraVENous Q8H Nakita Soria MD   10 mL at 01/09/22 6823    sodium chloride (NS) flush 5-40 mL  5-40 mL IntraVENous PRN Nakita Soria MD        acetaminophen (TYLENOL) tablet 650 mg  650 mg Oral Q6H PRN Nakita Soria MD   650 mg at 01/02/22 1827    Or    acetaminophen (TYLENOL) suppository 650 mg  650 mg Rectal Q6H PRN Nakita Soria MD        polyethylene glycol (MIRALAX) packet 17 g  17 g Oral DAILY PRN Nakita Soria MD        ondansetron (ZOFRAN ODT) tablet 4 mg  4 mg Oral Q8H PRN Nakita Soria MD        Or    ondansetron (ZOFRAN) injection 4 mg  4 mg IntraVENous Q6H PRN Nakita Soria MD   4 mg at 01/09/22 0401    enoxaparin (LOVENOX) injection 30 mg  30 mg SubCUTAneous DAILY Nakita Soria MD   30 mg at 01/08/22 1136    gabapentin (NEURONTIN) capsule 300 mg  300 mg Oral BID Nakita Soria MD   300 mg at 01/06/22 9619

## 2022-01-10 LAB
ANION GAP SERPL CALC-SCNC: 8 MMOL/L (ref 3–18)
BUN SERPL-MCNC: 25 MG/DL (ref 7–18)
BUN/CREAT SERPL: 9 (ref 12–20)
CALCIUM SERPL-MCNC: 7.8 MG/DL (ref 8.5–10.1)
CHLORIDE SERPL-SCNC: 110 MMOL/L (ref 100–111)
CO2 SERPL-SCNC: 23 MMOL/L (ref 21–32)
CREAT SERPL-MCNC: 2.94 MG/DL (ref 0.6–1.3)
GLUCOSE BLD STRIP.AUTO-MCNC: 140 MG/DL (ref 70–110)
GLUCOSE BLD STRIP.AUTO-MCNC: 160 MG/DL (ref 70–110)
GLUCOSE BLD STRIP.AUTO-MCNC: 237 MG/DL (ref 70–110)
GLUCOSE SERPL-MCNC: 162 MG/DL (ref 74–99)
MAGNESIUM SERPL-MCNC: 2 MG/DL (ref 1.6–2.6)
POTASSIUM SERPL-SCNC: 3.6 MMOL/L (ref 3.5–5.5)
SODIUM SERPL-SCNC: 141 MMOL/L (ref 136–145)

## 2022-01-10 PROCEDURE — 74011636637 HC RX REV CODE- 636/637: Performed by: HOSPITALIST

## 2022-01-10 PROCEDURE — 65270000029 HC RM PRIVATE

## 2022-01-10 PROCEDURE — 74011636637 HC RX REV CODE- 636/637: Performed by: INTERNAL MEDICINE

## 2022-01-10 PROCEDURE — 82962 GLUCOSE BLOOD TEST: CPT

## 2022-01-10 PROCEDURE — 36415 COLL VENOUS BLD VENIPUNCTURE: CPT

## 2022-01-10 PROCEDURE — 80048 BASIC METABOLIC PNL TOTAL CA: CPT

## 2022-01-10 PROCEDURE — 74011250637 HC RX REV CODE- 250/637: Performed by: HOSPITALIST

## 2022-01-10 PROCEDURE — 74011250637 HC RX REV CODE- 250/637: Performed by: INTERNAL MEDICINE

## 2022-01-10 PROCEDURE — 74011250636 HC RX REV CODE- 250/636: Performed by: HOSPITALIST

## 2022-01-10 PROCEDURE — 74011250636 HC RX REV CODE- 250/636: Performed by: INTERNAL MEDICINE

## 2022-01-10 PROCEDURE — 83735 ASSAY OF MAGNESIUM: CPT

## 2022-01-10 PROCEDURE — 74011000250 HC RX REV CODE- 250: Performed by: INTERNAL MEDICINE

## 2022-01-10 RX ORDER — INSULIN GLARGINE 100 [IU]/ML
8 INJECTION, SOLUTION SUBCUTANEOUS DAILY
Status: DISCONTINUED | OUTPATIENT
Start: 2022-01-11 | End: 2022-01-12 | Stop reason: HOSPADM

## 2022-01-10 RX ORDER — METOCLOPRAMIDE HYDROCHLORIDE 5 MG/ML
2.5 INJECTION INTRAMUSCULAR; INTRAVENOUS
Status: DISCONTINUED | OUTPATIENT
Start: 2022-01-10 | End: 2022-01-12 | Stop reason: HOSPADM

## 2022-01-10 RX ORDER — ENOXAPARIN SODIUM 100 MG/ML
40 INJECTION SUBCUTANEOUS DAILY
Status: DISCONTINUED | OUTPATIENT
Start: 2022-01-10 | End: 2022-01-12 | Stop reason: HOSPADM

## 2022-01-10 RX ADMIN — METOPROLOL TARTRATE 5 MG: 5 INJECTION INTRAVENOUS at 00:25

## 2022-01-10 RX ADMIN — INSULIN GLARGINE 5 UNITS: 100 INJECTION, SOLUTION SUBCUTANEOUS at 10:24

## 2022-01-10 RX ADMIN — Medication 4 UNITS: at 22:18

## 2022-01-10 RX ADMIN — METRONIDAZOLE 500 MG: 500 INJECTION, SOLUTION INTRAVENOUS at 04:59

## 2022-01-10 RX ADMIN — SODIUM CHLORIDE, PRESERVATIVE FREE 10 ML: 5 INJECTION INTRAVENOUS at 22:19

## 2022-01-10 RX ADMIN — HYDRALAZINE HYDROCHLORIDE 10 MG: 20 INJECTION INTRAMUSCULAR; INTRAVENOUS at 18:46

## 2022-01-10 RX ADMIN — METRONIDAZOLE 500 MG: 500 INJECTION, SOLUTION INTRAVENOUS at 20:08

## 2022-01-10 RX ADMIN — AMLODIPINE BESYLATE 10 MG: 5 TABLET ORAL at 10:23

## 2022-01-10 RX ADMIN — METOPROLOL TARTRATE 5 MG: 5 INJECTION INTRAVENOUS at 12:50

## 2022-01-10 RX ADMIN — PANTOPRAZOLE SODIUM 40 MG: 40 TABLET, DELAYED RELEASE ORAL at 20:06

## 2022-01-10 RX ADMIN — HYDRALAZINE HYDROCHLORIDE 10 MG: 20 INJECTION INTRAMUSCULAR; INTRAVENOUS at 12:50

## 2022-01-10 RX ADMIN — SODIUM CHLORIDE, PRESERVATIVE FREE 10 ML: 5 INJECTION INTRAVENOUS at 05:01

## 2022-01-10 RX ADMIN — METOPROLOL TARTRATE 5 MG: 5 INJECTION INTRAVENOUS at 18:46

## 2022-01-10 RX ADMIN — CETIRIZINE HYDROCHLORIDE 5 MG: 10 TABLET, FILM COATED ORAL at 10:23

## 2022-01-10 RX ADMIN — Medication 2 UNITS: at 10:24

## 2022-01-10 RX ADMIN — CIPROFLOXACIN 200 MG: 2 INJECTION, SOLUTION INTRAVENOUS at 10:22

## 2022-01-10 RX ADMIN — SODIUM CHLORIDE, PRESERVATIVE FREE 10 ML: 5 INJECTION INTRAVENOUS at 18:47

## 2022-01-10 RX ADMIN — HYDRALAZINE HYDROCHLORIDE 10 MG: 20 INJECTION INTRAMUSCULAR; INTRAVENOUS at 04:54

## 2022-01-10 RX ADMIN — PANTOPRAZOLE SODIUM 40 MG: 40 TABLET, DELAYED RELEASE ORAL at 10:22

## 2022-01-10 RX ADMIN — Medication 4 UNITS: at 12:52

## 2022-01-10 RX ADMIN — METOCLOPRAMIDE HYDROCHLORIDE 2.5 MG: 5 INJECTION INTRAMUSCULAR; INTRAVENOUS at 06:49

## 2022-01-10 RX ADMIN — ENOXAPARIN SODIUM 40 MG: 100 INJECTION SUBCUTANEOUS at 12:49

## 2022-01-10 RX ADMIN — METRONIDAZOLE 500 MG: 500 INJECTION, SOLUTION INTRAVENOUS at 13:00

## 2022-01-10 RX ADMIN — GABAPENTIN 300 MG: 300 CAPSULE ORAL at 12:50

## 2022-01-10 RX ADMIN — METOCLOPRAMIDE HYDROCHLORIDE 2.5 MG: 5 INJECTION INTRAMUSCULAR; INTRAVENOUS at 12:51

## 2022-01-10 RX ADMIN — HYDRALAZINE HYDROCHLORIDE 10 MG: 20 INJECTION INTRAMUSCULAR; INTRAVENOUS at 22:17

## 2022-01-10 RX ADMIN — CIPROFLOXACIN 200 MG: 2 INJECTION, SOLUTION INTRAVENOUS at 20:07

## 2022-01-10 RX ADMIN — METOPROLOL TARTRATE 5 MG: 5 INJECTION INTRAVENOUS at 06:49

## 2022-01-10 NOTE — PROGRESS NOTES
Pt is  and independent.  Pt has confirmed his PCP. Denisa Whitlock encourage ambulation or  Order therapy services as appropriate to assist with identifying any potential transition of care needs.  Anticipate pt will transition home with physician follow up when medically stable.  CM jak higgins to follow and assist.     Care Management Interventions  Mode of Transport at Discharge:  Other (see comment) (Family)  Transition of Care Consult (CM Consult): Discharge Planning  Health Maintenance Reviewed: Yes  Support Systems: Spouse/Significant Other  Confirm Follow Up Transport: Self  The Plan for Transition of Care is Related to the Following Treatment Goals : Home with physician follow up  Discharge Location  Discharge Placement: Home with family assistance

## 2022-01-10 NOTE — PROGRESS NOTES
0584 Received report from off going [facility]. Report included the following information SBAR, Kardex, Intake/Output, MAR and Recent Results. 1920 Gave report to oncoming nurse Bernie Manning RN. Report included the following information SBAR, Kardex, Intake/Output, MAR and Recent Results.

## 2022-01-10 NOTE — PROGRESS NOTES
Hospitalist Progress Note-critical care note     Patient: Michele Mendes MRN: [id number]  CSN: [id number]    YOB: 1975  Age: 42 y.o. Sex: male    DOA: 12/30/2021 LOS:  LOS: 11 days            Chief complaint: colitis miladis , dm ,     Assessment/Plan         Hospital Problems  Date Reviewed: 12/30/2021          Codes Class Noted POA    Chest pain ICD-10-CM: R07.9  ICD-9-CM: 786.50  1/6/2022 Unknown        * (Principal) Colitis ICD-10-CM: K52.9  ICD-9-CM: 558.9  12/30/2021 Unknown        Diarrhea ICD-10-CM: R19.7  ICD-9-CM: 787.91  12/30/2021 Unknown        MILADIS (acute kidney injury) ([facility] ICD-10-CM: N17.9  ICD-9-CM: 584.9  12/30/2021 Unknown        DM (diabetes mellitus) ([facility] ICD-10-CM: E11.9  ICD-9-CM: 250.00  12/30/2021 Unknown        Nausea & vomiting ICD-10-CM: R11.2  ICD-9-CM: 787.01  12/30/2021 Unknown        Diabetes mellitus ([facility] ICD-10-CM: E11.9  ICD-9-CM: 250.00  12/30/2021 Unknown        Generalized abdominal pain ICD-10-CM: R10.84  ICD-9-CM: 789.07  12/30/2021 Unknown        Acute kidney injury [facility]) ICD-10-CM: N17.9  ICD-9-CM: 584.9  12/30/2021 Unknown               Intractable nausea/ vomiting-  Improving, will hold reglan now, to see if he still n/v -if recurrent will give gi a call   covid 19 rapid 2 times negative   Continue symptom tx        Abdominal pain. -no pain now       Colitis   No diarrhea   Continue tx      MILADIS -improving very slowly     DM2  Continue ssi ,increase lantus to 8      HTN- uncontrolled  On metoprolol and norvasc. Hold reglan today to see if n/v coming back if not change to po metoprolol     Grade II diastolic dysfunction- compensated     Chest pain-no pain today    Trop wnl    Stress test negative for ischemia   V/q scan no PE     Subjective: nausea better. Would like to try pills     Case discussed with renal   Disposition :tbd,   Review of systems:    General: No fevers or chills. Cardiovascular: No chest pain or pressure. No palpitations.    Pulmonary: No shortness of breath. Gastrointestinal: +nausea, no vomiting. Vital signs/Intake and Output:  Visit Vitals  BP (!) 141/85   Pulse 89   Temp 98.4 °F (36.9 °C)   Resp 17   Ht 5' 6\" (1.676 m)   Wt 97.8 kg (215 lb 11.2 oz)   SpO2 97%   BMI 34.81 kg/m²     Current Shift:  No intake/output data recorded. Last three shifts:  01/08 1901 - 01/10 0700  In: -   Out: 800 [Urine:700]    Physical Exam:  General: WD, WN. Alert, cooperative, no acute distress    HEENT: NC, Atraumatic. PERRLA, anicteric sclerae. Lungs: CTA Bilaterally. No Wheezing/Rhonchi/Rales. Heart:  Regular  rhythm,  No murmur, No Rubs, No Gallops  Abdomen: Soft, Non distended, Non tender. +Bowel sounds,   Extremities: No c/c/e  Psych:   Not anxious or agitated. Neurologic:  No acute neurological deficit. Labs: Results:       Chemistry Recent Labs     01/09/22 0215 01/08/22  0155   * 146*    142   K 3.9 3.8    111   CO2 23 24   BUN 25* 29*   CREA 2.97* 3.06*   CA 8.3* 8.4*   AGAP 9 7   BUCR 8* 9*   AP 65  --    TP 5.4*  --    ALB 2.7*  --    GLOB 2.7  --    AGRAT 1.0  --       CBC w/Diff Recent Labs     01/09/22 0215   WBC 10.3   RBC 4.28*   HGB 11.3*   HCT 33.6*      GRANS 67   LYMPH 19*   EOS 3      Cardiac Enzymes No results for input(s): CPK, CKND1, PARTH in the last 72 hours. No lab exists for component: CKRMB, TROIP   Coagulation No results for input(s): PTP, INR, APTT, INREXT, INREXT in the last 72 hours. Lipid Panel No results found for: CHOL, CHOLPOCT, CHOLX, CHLST, CHOLV, 701705, HDL, HDLP, LDL, LDLC, DLDLP, 307114, VLDLC, VLDL, TGLX, TRIGL, TRIGP, TGLPOCT, CHHD, CHHDX   BNP No results for input(s): BNPP in the last 72 hours.    Liver Enzymes Recent Labs     01/09/22  0215   TP 5.4*   ALB 2.7*   AP 65      Thyroid Studies No results found for: T4, T3U, TSH, TSHEXT, TSHEXT     Procedures/imaging: see electronic medical records for all procedures/Xrays and details which were not copied into this note but were reviewed prior to creation of Plan    XR CHEST PA LAT    Result Date: 12/30/2021  EXAM: XR CHEST PA LAT CLINICAL INDICATION/HISTORY: Chest pain -Additional: None COMPARISON: None TECHNIQUE: PA and lateral views of the chest _______________ FINDINGS: HEART AND MEDIASTINUM: Cardiac size and mediastinal contours are within normal limits LUNGS AND PLEURAL SPACES: No focal pneumonic consolidation, pneumothorax or pleural effusion BONY THORAX AND SOFT TISSUES: No acute osseous abnormality _______________     No acute findings in the chest.    CT ABD PELV WO CONT    Result Date: 12/30/2021  EXAM: CT of the abdomen and pelvis INDICATION: Epigastric abdominal pain, chest pain COMPARISON: None. TECHNIQUE: Axial CT imaging of the abdomen and pelvis was performed without intravenous contrast. Multiplanar reformats were generated. One or more dose reduction techniques were used on this CT: automated exposure control, adjustment of the mAs and/or kVp according to patient size, and iterative reconstruction techniques. The specific techniques used on this CT exam have been documented in the patient's electronic medical record. Digital Imaging and Communications in Medicine (DICOM) format image data are available to nonaffiliated external healthcare facilities or entities on a secure, media free, reciprocally searchable basis with patient authorization for at least a 12-month period after this study. _______________ FINDINGS: LOWER CHEST: Streaky areas of atelectasis without evidence of consolidation or pleural effusion. Normal cardiac size. No pericardial effusion. LIVER, BILIARY: Liver is normal. No biliary dilation. Gallbladder is unremarkable. PANCREAS: Normal unenhanced appearance. SPLEEN: Normal. ADRENALS: Normal. KIDNEYS/URETERS/BLADDER: No evidence of hydronephrosis involving either kidney. No nephrolithiasis. Urinary bladder unremarkable. PELVIC ORGANS: Unremarkable.  VASCULATURE: Unremarkable LYMPH NODES: No enlarged lymph nodes. GASTROINTESTINAL TRACT: There is mild wall thickening involving the proximal portion of the sigmoid colon with accompanying right colonic stranding and edema. No evidence of an organized or drainable pericolonic fluid collection. No gross free intraperitoneal perforation. Normal appendix. BONES: No acute or aggressive osseous abnormalities identified. OTHER: Rectus diastases involving the anterior abdominal wall _______________     1. Mild proximal sigmoid colonic wall thickening and inflammatory changes suggesting an infectious or inflammatory colitis.   > No evidence of an organized or drainable fluid collection.   > No gross free intraperitoneal perforation. 2. No evidence of urolithiasis or obstructive uropathy. ECHO ADULT COMPLETE    Result Date: 1/1/2022    Left Ventricle: Left ventricle size is normal. Mildly increased wall thickness. Normal wall motion. Normal left ventricular systolic function with a visually estimated EF of 60 - 65%. Grade II diastolic dysfunction with increased LAP.   Mitral Valve: Mildly thickened leaflets. Mild transvalvular regurgitation. No stenosis.   Tricuspid Valve: Tricuspid regurgitation is inadequate for estimation of right ventricular systolic pressure.   IVC/SVC: IVC diameter is less than or equal to 21 mm and decreases greater than 50% during inspiration; therefore the estimated right atrial pressure is normal (~3 mmHg).        Chandni Henderson MD

## 2022-01-10 NOTE — PROGRESS NOTES
Nutrition Assessment     Type and Reason for Visit: Reassess    Nutrition Recommendations/Plan: Modify Glucerna from BID to TID. Nutrition Assessment:  PMHx: DM, HTN, neuropathy. Admitted with nausea/vomiting- suspects gastroparesis, colitis, no diarrhea, MILADIS - stable, abdominal pain likely d/t colitis. Diet changed from regular to pureed. Estimated Daily Nutrient Needs:  Energy (kcal):  2088  Protein (g):  91       Fluid (ml/day):  2088    Nutrition Related Findings:  Lab: GFR est AA 28, POC glucose 117-237 (last 24 hr). Med: protonix, reglan, humalog, lantus. BM 1/7. Poor PO intake- recent documentation 1/7 of 0%. Per MD note, will take some pureed. Spoke with pt- was hungry. Stated only eating broth during lunch. Stated wanted food- encouraged pt to speak with MD or nursing for diet advancement. Pt has been drinking Glucerna. Will modify from BID to TID.      Current Nutrition Therapies:  ADULT ORAL NUTRITION SUPPLEMENT Breakfast, Dinner; Diabetic Supplement  ADULT DIET Dysphagia - Pureed    Anthropometric Measures:  · Height:  5' 6\" (167.6 cm)  · Current Body Wt:  100.9 kg (222 lb 7.1 oz)  · BMI: 35.9    Nutrition Diagnosis:   · Inadequate energy intake related to altered GI function as evidenced by intake 0-25%,nausea    Nutrition Intervention:  Food and/or Nutrient Delivery: Continue current diet,Continue oral nutrition supplement  Nutrition Education and Counseling: No recommendations at this time  Coordination of Nutrition Care: Continue to monitor while inpatient    Goals:  Continue to meet >50% of meals and supplements throughout the next 3-5 days       Nutrition Monitoring and Evaluation:   Behavioral-Environmental Outcomes: None identified  Food/Nutrient Intake Outcomes: Food and nutrient intake,Supplement intake,Diet advancement/tolerance  Physical Signs/Symptoms Outcomes: Biochemical data,GI status,Nausea/vomiting,Meal time behavior,Skin,Weight    Discharge Planning:    Continue current diet,Continue oral nutrition supplement     Electronically signed by Gregorio Churchill RD on 1/10/2022 at 12:53 PM

## 2022-01-10 NOTE — PROGRESS NOTES
RENAL CONSULT PROGRESS NOTE   1/10/2022    Patient:  Uli Canas  :  1975  Gender:  male  MRN #:  [id number]    Reason for Consult: Acute renal failure     Assessment: Uli Canas is a 42y.o. year old male with h/o  DM, HTN,Neuropathy admitted for colitis, reportedly had more than 10 episodes of watery diarrhea on  and h/o loose stool/diarrhoea for 1 yr and uncontrolled DM. Baseline serum creatinine on  was 0.94 mg/dl on admission it was 3.47 mg/dl and is 3.28 mg/dl on    Ct abdomen and pelvis does not have evidence of hydronephrosis and obstruction . He has decent urine output overnight . Hemodynamically stable without evidence of hypotension. No evidence of proteinuria and hematuria on urine analysis .  Does not have clinically significant proteinuria so less likely glomerular disease   So based on presentation Acute renal failure most likely is  pre renal volume depletion due to watery diarrhoea vs toxin related ATN due to infection/colitis or oxalate nephropathy due to malabsorption        SUBJECTIVE:   -today he says he feels better, denied abdominal pain, nausea and diarrhea  Reports urinating fine   Bp stable         PLAN:-   -Reports urinating fine, very slow improvement in renal functions   - Ensure that he does not retain urine  -- Intake and output  - Hold ACEI/ARBS and lasix  - Continue amlodipine for hypertension , continue hydralazine   - Avoid NSAIDS,.contrats and nephrotoxin  - Dose all meds including gabapentin  and antibiotics for current eGFR  - No clinical and metabolic indication of RRT  - He has risk factors for CKD, optimum diabetic and BP management   - Acidosis improving, stop sodium bicarbonate , stop NS infusion, encourage oral water     Rest of the management per primary team     No intake or output data in the 24 hours ending 01/10/22 1125      Objective:    Visit Vitals  BP (!) 144/88 (BP 1 Location: Left upper arm, BP Patient Position: At rest)   Pulse (!) 103   Temp 98.4 °F (36.9 °C)   Resp 18   Ht 5' 6\" (1.676 m)   Wt 100.9 kg (222 lb 6.4 oz)   SpO2 96%   BMI 35.90 kg/m²       Physical Exam:    Pt awake  HEENT:-  no neck swelling  Lung: clear to auscultation  Ext: no edema  abdomen is non tender       Laboratory Data:    Lab Results   Component Value Date    BUN 25 (H) 01/10/2022    BUN 25 (H) 01/09/2022    BUN 29 (H) 01/08/2022     01/10/2022     01/09/2022     01/08/2022    CO2 23 01/10/2022    CO2 23 01/09/2022    CO2 24 01/08/2022     Lab Results   Component Value Date    WBC 10.3 01/09/2022    HGB 11.3 (L) 01/09/2022    HCT 33.6 (L) 01/09/2022     No components found for: CALCIUM, PHOSPHORUS, MAGNESIUM  No results found for: HDL  No results found for: SPECIMENTYP, TURBIDITY, UGLU    Imaging Reveiwed:    CT abdomen/pelvis 12/30/21: Mild proximal sigmoid colonic wall thickening and inflammatory changes  suggesting an infectious or inflammatory colitis.     > No evidence of an organized or drainable fluid collection.     > No gross free intraperitoneal perforation.     2.  No evidence of urolithiasis or obstructive uropathy.           Estefani Anderson MD   [facility]

## 2022-01-10 NOTE — PROGRESS NOTES
Pharmacy Renal Dosing Services:     Lovenox 30 mg sc q24h was automatically dose-adjusted to Lovenox 40 mg sc q24h per THE [facility] P&T Committee Protocol, with respect to improved renal function. Indication: DVT prophylaxis     Pt Weight:   Wt Readings from Last 1 Encounters:   01/10/22 100.9 kg (222 lb 6.4 oz)     Serum Creatinine Lab Results   Component Value Date/Time    Creatinine 2.94 (H) 01/10/2022 01:35 AM       Creatinine Clearance Estimated Creatinine Clearance: 35.6 mL/min (A) (based on SCr of 2.94 mg/dL (H)). BUN Lab Results   Component Value Date/Time    BUN 25 (H) 01/10/2022 01:35 AM         Pharmacy to continue to monitor patient daily. Will make dosage adjustments based upon changing renal function. Signed Janece Reasoner.  Contact information: [phone]

## 2022-01-11 LAB
ANION GAP SERPL CALC-SCNC: 6 MMOL/L (ref 3–18)
BUN SERPL-MCNC: 25 MG/DL (ref 7–18)
BUN/CREAT SERPL: 8 (ref 12–20)
CALCIUM SERPL-MCNC: 7.8 MG/DL (ref 8.5–10.1)
CHLORIDE SERPL-SCNC: 112 MMOL/L (ref 100–111)
CO2 SERPL-SCNC: 24 MMOL/L (ref 21–32)
CREAT SERPL-MCNC: 2.95 MG/DL (ref 0.6–1.3)
GLUCOSE BLD STRIP.AUTO-MCNC: 177 MG/DL (ref 70–110)
GLUCOSE BLD STRIP.AUTO-MCNC: 238 MG/DL (ref 70–110)
GLUCOSE BLD STRIP.AUTO-MCNC: 247 MG/DL (ref 70–110)
GLUCOSE BLD STRIP.AUTO-MCNC: 256 MG/DL (ref 70–110)
GLUCOSE BLD STRIP.AUTO-MCNC: 257 MG/DL (ref 70–110)
GLUCOSE SERPL-MCNC: 192 MG/DL (ref 74–99)
MAGNESIUM SERPL-MCNC: 2 MG/DL (ref 1.6–2.6)
POTASSIUM SERPL-SCNC: 3.9 MMOL/L (ref 3.5–5.5)
SODIUM SERPL-SCNC: 142 MMOL/L (ref 136–145)

## 2022-01-11 PROCEDURE — 65270000029 HC RM PRIVATE

## 2022-01-11 PROCEDURE — 74011000250 HC RX REV CODE- 250: Performed by: INTERNAL MEDICINE

## 2022-01-11 PROCEDURE — 74011250636 HC RX REV CODE- 250/636: Performed by: INTERNAL MEDICINE

## 2022-01-11 PROCEDURE — 74011636637 HC RX REV CODE- 636/637: Performed by: INTERNAL MEDICINE

## 2022-01-11 PROCEDURE — 36415 COLL VENOUS BLD VENIPUNCTURE: CPT

## 2022-01-11 PROCEDURE — 80048 BASIC METABOLIC PNL TOTAL CA: CPT

## 2022-01-11 PROCEDURE — 74011250636 HC RX REV CODE- 250/636: Performed by: HOSPITALIST

## 2022-01-11 PROCEDURE — 83735 ASSAY OF MAGNESIUM: CPT

## 2022-01-11 PROCEDURE — 74011636637 HC RX REV CODE- 636/637: Performed by: HOSPITALIST

## 2022-01-11 PROCEDURE — 74011250637 HC RX REV CODE- 250/637: Performed by: INTERNAL MEDICINE

## 2022-01-11 PROCEDURE — 74011250637 HC RX REV CODE- 250/637: Performed by: HOSPITALIST

## 2022-01-11 RX ADMIN — CETIRIZINE HYDROCHLORIDE 5 MG: 10 TABLET, FILM COATED ORAL at 08:27

## 2022-01-11 RX ADMIN — INSULIN GLARGINE 8 UNITS: 100 INJECTION, SOLUTION SUBCUTANEOUS at 08:27

## 2022-01-11 RX ADMIN — HYDRALAZINE HYDROCHLORIDE 10 MG: 20 INJECTION INTRAMUSCULAR; INTRAVENOUS at 17:51

## 2022-01-11 RX ADMIN — HYDRALAZINE HYDROCHLORIDE 10 MG: 20 INJECTION INTRAMUSCULAR; INTRAVENOUS at 11:35

## 2022-01-11 RX ADMIN — PANTOPRAZOLE SODIUM 40 MG: 40 TABLET, DELAYED RELEASE ORAL at 08:28

## 2022-01-11 RX ADMIN — METOPROLOL TARTRATE 5 MG: 5 INJECTION INTRAVENOUS at 05:42

## 2022-01-11 RX ADMIN — METRONIDAZOLE 500 MG: 500 INJECTION, SOLUTION INTRAVENOUS at 12:51

## 2022-01-11 RX ADMIN — ENOXAPARIN SODIUM 40 MG: 100 INJECTION SUBCUTANEOUS at 12:50

## 2022-01-11 RX ADMIN — Medication 2 UNITS: at 12:49

## 2022-01-11 RX ADMIN — METOPROLOL TARTRATE 5 MG: 5 INJECTION INTRAVENOUS at 12:50

## 2022-01-11 RX ADMIN — GABAPENTIN 300 MG: 300 CAPSULE ORAL at 12:50

## 2022-01-11 RX ADMIN — HYDRALAZINE HYDROCHLORIDE 10 MG: 20 INJECTION INTRAMUSCULAR; INTRAVENOUS at 05:41

## 2022-01-11 RX ADMIN — SODIUM CHLORIDE, PRESERVATIVE FREE 10 ML: 5 INJECTION INTRAVENOUS at 05:46

## 2022-01-11 RX ADMIN — PANTOPRAZOLE SODIUM 40 MG: 40 TABLET, DELAYED RELEASE ORAL at 20:32

## 2022-01-11 RX ADMIN — METRONIDAZOLE 500 MG: 500 INJECTION, SOLUTION INTRAVENOUS at 22:24

## 2022-01-11 RX ADMIN — CIPROFLOXACIN 200 MG: 2 INJECTION, SOLUTION INTRAVENOUS at 08:26

## 2022-01-11 RX ADMIN — METOPROLOL TARTRATE 5 MG: 5 INJECTION INTRAVENOUS at 17:51

## 2022-01-11 RX ADMIN — SODIUM CHLORIDE, PRESERVATIVE FREE 10 ML: 5 INJECTION INTRAVENOUS at 14:00

## 2022-01-11 RX ADMIN — Medication 4 UNITS: at 08:26

## 2022-01-11 RX ADMIN — GABAPENTIN 300 MG: 300 CAPSULE ORAL at 00:42

## 2022-01-11 RX ADMIN — Medication 6 UNITS: at 17:52

## 2022-01-11 RX ADMIN — Medication 6 UNITS: at 22:30

## 2022-01-11 RX ADMIN — HYDRALAZINE HYDROCHLORIDE 10 MG: 20 INJECTION INTRAMUSCULAR; INTRAVENOUS at 22:24

## 2022-01-11 RX ADMIN — METOPROLOL TARTRATE 5 MG: 5 INJECTION INTRAVENOUS at 00:42

## 2022-01-11 RX ADMIN — METRONIDAZOLE 500 MG: 500 INJECTION, SOLUTION INTRAVENOUS at 05:38

## 2022-01-11 RX ADMIN — CIPROFLOXACIN 200 MG: 2 INJECTION, SOLUTION INTRAVENOUS at 20:32

## 2022-01-11 RX ADMIN — AMLODIPINE BESYLATE 10 MG: 5 TABLET ORAL at 08:27

## 2022-01-11 NOTE — PROGRESS NOTES
2006-alert and oriented x 4. Lungs CTA on RA. BS active x 4 quads with c/o some aching to abdomen, declines pain medication. On tele box 38 showing ST. Pain rated at 0/10.

## 2022-01-11 NOTE — ROUTINE PROCESS
Bedside and Verbal shift change report given to Singh Mcknight (oncoming nurse) by Ave Washington RN (offgoing nurse). Report included the following information SBAR, Kardex, Intake/Output and MAR.

## 2022-01-11 NOTE — PROGRESS NOTES
RENAL CONSULT PROGRESS NOTE   2022    Patient:  Ramiro Burgos  :  1975  Gender:  male  MRN #:  [id number]    Reason for Consult: Acute renal failure     Assessment: Ramiro Burgos is a 42y.o. year old male with h/o  DM, HTN,Neuropathy admitted for colitis, reportedly had more than 10 episodes of watery diarrhea on  and h/o loose stool/diarrhoea for 1 yr and uncontrolled DM. Baseline serum creatinine on  was 0.94 mg/dl on admission it was 3.47 mg/dl and is 3.28 mg/dl on    Ct abdomen and pelvis does not have evidence of hydronephrosis and obstruction . He has decent urine output overnight . Hemodynamically stable without evidence of hypotension. No evidence of proteinuria and hematuria on urine analysis .  Does not have clinically significant proteinuria so less likely glomerular disease   So based on presentation Acute renal failure most likely is  pre renal volume depletion due to watery diarrhoea vs toxin related ATN due to infection/colitis or oxalate nephropathy due to malabsorption        SUBJECTIVE:   -He says he feels a lot better, denied abdominal pain, nausea and diarrhea  Reports urinating fine , no UOP recorded   Bp stable, no chest pain          PLAN:-   -Reports urinating fine, very slow improvement in renal functions, with some fluctuating in creatinine    - Ensure that he does not retain urine  -- Intake and output  - Hold ACEI/ARBS and lasix  - Continue amlodipine for hypertension , continue hydralazine   - Avoid NSAIDS,.contrats and nephrotoxin  - Dose all meds including gabapentin  and antibiotics for current eGFR  - No clinical and metabolic indication of RRT  - He has risk factors for CKD, optimum diabetic and BP management   - May need renal biopsy as outpatient once he is stable from other medical perspective, if creatinine remains elevated      Rest of the management per primary team     No intake or output data in the 24 hours ending 22 6421      Objective:    Visit Vitals  BP (!) 143/80 (BP 1 Location: Left upper arm, BP Patient Position: At rest)   Pulse (!) 101   Temp 98.6 °F (37 °C)   Resp 16   Ht 5' 6\" (1.676 m)   Wt 100.9 kg (222 lb 6.4 oz)   SpO2 95%   BMI 35.90 kg/m²       Physical Exam:    Pt awake  HEENT:-  no neck swelling  Lung: clear to auscultation  Ext: no edema         Laboratory Data:    Lab Results   Component Value Date    BUN 25 (H) 01/11/2022    BUN 25 (H) 01/10/2022    BUN 25 (H) 01/09/2022     01/11/2022     01/10/2022     01/09/2022    CO2 24 01/11/2022    CO2 23 01/10/2022    CO2 23 01/09/2022     Lab Results   Component Value Date    WBC 10.3 01/09/2022    HGB 11.3 (L) 01/09/2022    HCT 33.6 (L) 01/09/2022     No components found for: CALCIUM, PHOSPHORUS, MAGNESIUM  No results found for: HDL  No results found for: SPECIMENTYP, TURBIDITY, UGLU    Imaging Reveiwed:    CT abdomen/pelvis 12/30/21: Mild proximal sigmoid colonic wall thickening and inflammatory changes  suggesting an infectious or inflammatory colitis.     > No evidence of an organized or drainable fluid collection.     > No gross free intraperitoneal perforation.     2.  No evidence of urolithiasis or obstructive uropathy.           Nury Mcnally MD   [facility]

## 2022-01-11 NOTE — DIABETES MGMT
Diabetes/ Glycemic Control Plan of Care  Recommendations:    Recommend starting Mealtime Humalog - 3 units AC    Assessment:  Patient with known h/o T2DM. Blood sugar has  elevated within the past 24 hours with the initiation of diet as expected  (177 - 247 mg/dL). Patient would benefit from adding mealtime insulin to his regimen. Lantus was increased today - will continue to monitor for effectiveness. DX:     1. Acute kidney injury ([address]     2. Diffuse abdominal pain     3. Colitis     4. Type 2 diabetes mellitus with hyperglycemia, with long-term current use of insulin ([facility])        Recent Glucose Results:   Lab Results   Component Value Date/Time     (H) 01/11/2022 02:14 AM    GLUCPOC 177 (H) 01/11/2022 12:10 PM    GLUCPOC 247 (H) 01/11/2022 07:26 AM    GLUCPOC 238 (H) 01/10/2022 09:09 PM     Blood glucose values: Within target range (70-180mg/dL): No  Current insulin orders:    Lantus 8 units every 24 hours   Corrective Humalog ACHS - normal resistance scale  Total Daily Dose previous 24 hours =  22 units    Nutrition/Diet:   Active Orders   Diet    ADULT DIET Regular; 4 carb choices (60 gm/meal)      Home diabetes medications:   Key Antihyperglycemic Medications             insulin lispro (HumaLOG U-100 Insulin) 100 unit/mL injection (Taking) by SubCUTAneous route two (2) times a day. Pt unsure of exact insulin he takes at home. metFORMIN (GLUCOPHAGE) 1,000 mg tablet Take 1,000 mg by mouth two (2) times daily (with meals). Plan/Goals:   Blood glucose will be within target of 70 - 180 mg/dl within 72 hours  Reinforce dietary and medication compliance at home.        Education:  [] Refer to Diabetes Education Record                       [x] Education not indicated at this time     Catherine Jonny RN, BSN, [employer]  Professional   Glycemic Control Team   Phone:  [phone]  Tues - Thurs 8:30 - 4:30

## 2022-01-11 NOTE — PROGRESS NOTES
Hospitalist Progress Note-critical care note     Patient: Erin Pierce MRN: [id number]  CSN: [id number]    YOB: 1975  Age: 42 y.o. Sex: male    DOA: 12/30/2021 LOS:  LOS: 12 days            Chief complaint: colitis miladis , dm ,     Assessment/Plan         Hospital Problems  Date Reviewed: 12/30/2021          Codes Class Noted POA    Chest pain ICD-10-CM: R07.9  ICD-9-CM: 786.50  1/6/2022 Unknown        * (Principal) Colitis ICD-10-CM: K52.9  ICD-9-CM: 558.9  12/30/2021 Unknown        Diarrhea ICD-10-CM: R19.7  ICD-9-CM: 787.91  12/30/2021 Unknown        MILADIS (acute kidney injury) ([facility] ICD-10-CM: N17.9  ICD-9-CM: 584.9  12/30/2021 Unknown        DM (diabetes mellitus) ([facility] ICD-10-CM: E11.9  ICD-9-CM: 250.00  12/30/2021 Unknown        Nausea & vomiting ICD-10-CM: R11.2  ICD-9-CM: 787.01  12/30/2021 Unknown        Diabetes mellitus ([facility] ICD-10-CM: E11.9  ICD-9-CM: 250.00  12/30/2021 Unknown        Generalized abdominal pain ICD-10-CM: R10.84  ICD-9-CM: 789.07  12/30/2021 Unknown        Acute kidney injury [facility]) ICD-10-CM: N17.9  ICD-9-CM: 584.9  12/30/2021 Unknown               Intractable nausea/ vomiting-  Resolved   covid 19 rapid 2 times negative   Continue symptom tx      Abdominal pain. -no pain now       Colitis   No diarrhea   Continue tx      MILADIS -improving very slowly still above 2     DM2  Continue ssi ,increase lantus to 8      HTN- uncontrolled  On metoprolol and norvasc. Hold reglan today to see if n/v coming back if not change to po metoprolol     Grade II diastolic dysfunction- compensated     Chest pain-no pain today    Trop wnl    Stress test negative for ischemia   V/q scan no PE     Subjective: I feel fine , no vomiting       Disposition :tbd,   Review of systems:    General: No fevers or chills. Cardiovascular: No chest pain or pressure. No palpitations. Pulmonary: No shortness of breath. Gastrointestinal: +nausea, no vomiting.      Vital signs/Intake and Output:  Visit Vitals  BP (!) 143/80 (BP 1 Location: Left upper arm, BP Patient Position: At rest)   Pulse (!) 101   Temp 98.6 °F (37 °C)   Resp 16   Ht 5' 6\" (1.676 m)   Wt 100.9 kg (222 lb 6.4 oz)   SpO2 95%   BMI 35.90 kg/m²     Current Shift:  No intake/output data recorded. Last three shifts:  No intake/output data recorded. Physical Exam:  General: WD, WN. Alert, cooperative, no acute distress    HEENT: NC, Atraumatic. PERRLA, anicteric sclerae. Lungs: CTA Bilaterally. No Wheezing/Rhonchi/Rales. Heart:  Regular  rhythm,  No murmur, No Rubs, No Gallops  Abdomen: Soft, Non distended, Non tender. +Bowel sounds,   Extremities: No c/c/e  Psych:   Not anxious or agitated. Neurologic:  No acute neurological deficit. Labs: Results:       Chemistry Recent Labs     01/11/22  0214 01/10/22  0135 01/09/22 0215   * 162* 125*    141 142   K 3.9 3.6 3.9   * 110 110   CO2 24 23 23   BUN 25* 25* 25*   CREA 2.95* 2.94* 2.97*   CA 7.8* 7.8* 8.3*   AGAP 6 8 9   BUCR 8* 9* 8*   AP  --   --  65   TP  --   --  5.4*   ALB  --   --  2.7*   GLOB  --   --  2.7   AGRAT  --   --  1.0      CBC w/Diff Recent Labs     01/09/22 0215   WBC 10.3   RBC 4.28*   HGB 11.3*   HCT 33.6*      GRANS 67   LYMPH 19*   EOS 3      Cardiac Enzymes No results for input(s): CPK, CKND1, PARTH in the last 72 hours. No lab exists for component: CKRMB, TROIP   Coagulation No results for input(s): PTP, INR, APTT, INREXT, INREXT in the last 72 hours. Lipid Panel No results found for: CHOL, CHOLPOCT, CHOLX, CHLST, CHOLV, 295061, HDL, HDLP, LDL, LDLC, DLDLP, 960526, VLDLC, VLDL, TGLX, TRIGL, TRIGP, TGLPOCT, CHHD, CHHDX   BNP No results for input(s): BNPP in the last 72 hours.    Liver Enzymes Recent Labs     01/09/22  0215   TP 5.4*   ALB 2.7*   AP 65      Thyroid Studies No results found for: T4, T3U, TSH, TSHEXT, TSHEXT     Procedures/imaging: see electronic medical records for all procedures/Xrays and details which were not copied into this note but were reviewed prior to creation of Plan    XR CHEST PA LAT    Result Date: 12/30/2021  EXAM: XR CHEST PA LAT CLINICAL INDICATION/HISTORY: Chest pain -Additional: None COMPARISON: None TECHNIQUE: PA and lateral views of the chest _______________ FINDINGS: HEART AND MEDIASTINUM: Cardiac size and mediastinal contours are within normal limits LUNGS AND PLEURAL SPACES: No focal pneumonic consolidation, pneumothorax or pleural effusion BONY THORAX AND SOFT TISSUES: No acute osseous abnormality _______________     No acute findings in the chest.    CT ABD PELV WO CONT    Result Date: 12/30/2021  EXAM: CT of the abdomen and pelvis INDICATION: Epigastric abdominal pain, chest pain COMPARISON: None. TECHNIQUE: Axial CT imaging of the abdomen and pelvis was performed without intravenous contrast. Multiplanar reformats were generated. One or more dose reduction techniques were used on this CT: automated exposure control, adjustment of the mAs and/or kVp according to patient size, and iterative reconstruction techniques. The specific techniques used on this CT exam have been documented in the patient's electronic medical record. Digital Imaging and Communications in Medicine (DICOM) format image data are available to nonaffiliated external healthcare facilities or entities on a secure, media free, reciprocally searchable basis with patient authorization for at least a 12-month period after this study. _______________ FINDINGS: LOWER CHEST: Streaky areas of atelectasis without evidence of consolidation or pleural effusion. Normal cardiac size. No pericardial effusion. LIVER, BILIARY: Liver is normal. No biliary dilation. Gallbladder is unremarkable. PANCREAS: Normal unenhanced appearance. SPLEEN: Normal. ADRENALS: Normal. KIDNEYS/URETERS/BLADDER: No evidence of hydronephrosis involving either kidney. No nephrolithiasis. Urinary bladder unremarkable. PELVIC ORGANS: Unremarkable.  VASCULATURE: Unremarkable LYMPH NODES: No enlarged lymph nodes. GASTROINTESTINAL TRACT: There is mild wall thickening involving the proximal portion of the sigmoid colon with accompanying right colonic stranding and edema. No evidence of an organized or drainable pericolonic fluid collection. No gross free intraperitoneal perforation. Normal appendix. BONES: No acute or aggressive osseous abnormalities identified. OTHER: Rectus diastases involving the anterior abdominal wall _______________     1. Mild proximal sigmoid colonic wall thickening and inflammatory changes suggesting an infectious or inflammatory colitis.   > No evidence of an organized or drainable fluid collection.   > No gross free intraperitoneal perforation. 2. No evidence of urolithiasis or obstructive uropathy. ECHO ADULT COMPLETE    Result Date: 1/1/2022    Left Ventricle: Left ventricle size is normal. Mildly increased wall thickness. Normal wall motion. Normal left ventricular systolic function with a visually estimated EF of 60 - 65%. Grade II diastolic dysfunction with increased LAP.   Mitral Valve: Mildly thickened leaflets. Mild transvalvular regurgitation. No stenosis.   Tricuspid Valve: Tricuspid regurgitation is inadequate for estimation of right ventricular systolic pressure.   IVC/SVC: IVC diameter is less than or equal to 21 mm and decreases greater than 50% during inspiration; therefore the estimated right atrial pressure is normal (~3 mmHg).        Javon Crump MD

## 2022-01-11 NOTE — PROGRESS NOTES
Shift Summary:  Patient slept through the night. Denied pain or discomfort. Denied having bowel movements overnight. BP elevated, ST. Received scheduled hydralazine 10 mg IV and metoprolol 5 mg IV.    0810 - Bedside and Verbal shift change report given to HAIR Concepcion RN (oncoming nurse) by Fermin Bell (offgoing nurse). Report included the following information SBAR, Kardex, Intake/Output and MAR.

## 2022-01-11 NOTE — ROUTINE PROCESS
Bedside and Verbal shift change report given to WESTON Montilla RN (oncoming nurse) by Mary Ann Alvarado. Vicenta Edwards RN (offgoing nurse). Report included the following information SBAR, Kardex, Intake/Output and MAR.

## 2022-01-12 VITALS
OXYGEN SATURATION: 96 % | RESPIRATION RATE: 18 BRPM | HEIGHT: 66 IN | WEIGHT: 222.4 LBS | HEART RATE: 101 BPM | TEMPERATURE: 98.4 F | DIASTOLIC BLOOD PRESSURE: 92 MMHG | BODY MASS INDEX: 35.74 KG/M2 | SYSTOLIC BLOOD PRESSURE: 150 MMHG

## 2022-01-12 LAB
ANION GAP SERPL CALC-SCNC: 6 MMOL/L (ref 3–18)
BUN SERPL-MCNC: 26 MG/DL (ref 7–18)
BUN/CREAT SERPL: 8 (ref 12–20)
CALCIUM SERPL-MCNC: 8.2 MG/DL (ref 8.5–10.1)
CHLORIDE SERPL-SCNC: 112 MMOL/L (ref 100–111)
CO2 SERPL-SCNC: 23 MMOL/L (ref 21–32)
CREAT SERPL-MCNC: 3.07 MG/DL (ref 0.6–1.3)
GLUCOSE BLD STRIP.AUTO-MCNC: 228 MG/DL (ref 70–110)
GLUCOSE BLD STRIP.AUTO-MCNC: 265 MG/DL (ref 70–110)
GLUCOSE SERPL-MCNC: 231 MG/DL (ref 74–99)
MAGNESIUM SERPL-MCNC: 2.1 MG/DL (ref 1.6–2.6)
POTASSIUM SERPL-SCNC: 4.2 MMOL/L (ref 3.5–5.5)
SODIUM SERPL-SCNC: 141 MMOL/L (ref 136–145)

## 2022-01-12 PROCEDURE — 74011250636 HC RX REV CODE- 250/636: Performed by: HOSPITALIST

## 2022-01-12 PROCEDURE — 80048 BASIC METABOLIC PNL TOTAL CA: CPT

## 2022-01-12 PROCEDURE — 74011636637 HC RX REV CODE- 636/637: Performed by: INTERNAL MEDICINE

## 2022-01-12 PROCEDURE — 74011000250 HC RX REV CODE- 250: Performed by: INTERNAL MEDICINE

## 2022-01-12 PROCEDURE — 74011250637 HC RX REV CODE- 250/637: Performed by: HOSPITALIST

## 2022-01-12 PROCEDURE — 74011250637 HC RX REV CODE- 250/637: Performed by: INTERNAL MEDICINE

## 2022-01-12 PROCEDURE — 74011636637 HC RX REV CODE- 636/637: Performed by: HOSPITALIST

## 2022-01-12 PROCEDURE — 36415 COLL VENOUS BLD VENIPUNCTURE: CPT

## 2022-01-12 PROCEDURE — 74011250636 HC RX REV CODE- 250/636: Performed by: INTERNAL MEDICINE

## 2022-01-12 PROCEDURE — 83735 ASSAY OF MAGNESIUM: CPT

## 2022-01-12 PROCEDURE — 82962 GLUCOSE BLOOD TEST: CPT

## 2022-01-12 RX ORDER — GABAPENTIN 300 MG/1
300 CAPSULE ORAL DAILY
Status: DISCONTINUED | OUTPATIENT
Start: 2022-01-13 | End: 2022-01-12 | Stop reason: HOSPADM

## 2022-01-12 RX ORDER — HYDRALAZINE HYDROCHLORIDE 25 MG/1
75 TABLET, FILM COATED ORAL 3 TIMES DAILY
Qty: 270 TABLET | Refills: 0 | Status: SHIPPED | OUTPATIENT
Start: 2022-01-12 | End: 2022-02-11

## 2022-01-12 RX ORDER — GABAPENTIN 300 MG/1
300 CAPSULE ORAL DAILY
Qty: 30 CAPSULE | Refills: 0 | Status: SHIPPED | OUTPATIENT
Start: 2022-01-13

## 2022-01-12 RX ORDER — METOPROLOL TARTRATE 25 MG/1
25 TABLET, FILM COATED ORAL EVERY 12 HOURS
Qty: 60 TABLET | Refills: 0 | Status: SHIPPED | OUTPATIENT
Start: 2022-01-12

## 2022-01-12 RX ORDER — AMLODIPINE BESYLATE 10 MG/1
10 TABLET ORAL DAILY
Qty: 30 TABLET | Refills: 0 | Status: SHIPPED | OUTPATIENT
Start: 2022-01-13

## 2022-01-12 RX ORDER — CETIRIZINE HYDROCHLORIDE 5 MG/1
5 TABLET ORAL DAILY
Qty: 10 TABLET | Refills: 0 | Status: SHIPPED | OUTPATIENT
Start: 2022-01-13

## 2022-01-12 RX ORDER — PANTOPRAZOLE SODIUM 40 MG/1
40 TABLET, DELAYED RELEASE ORAL DAILY
Qty: 30 TABLET | Refills: 0 | Status: SHIPPED | OUTPATIENT
Start: 2022-01-12

## 2022-01-12 RX ORDER — METOPROLOL TARTRATE 25 MG/1
25 TABLET, FILM COATED ORAL EVERY 12 HOURS
Status: DISCONTINUED | OUTPATIENT
Start: 2022-01-12 | End: 2022-01-12 | Stop reason: HOSPADM

## 2022-01-12 RX ORDER — INSULIN GLARGINE 100 [IU]/ML
10 INJECTION, SOLUTION SUBCUTANEOUS DAILY
Qty: 1 ML | Refills: 0 | Status: SHIPPED
Start: 2022-01-12

## 2022-01-12 RX ADMIN — METRONIDAZOLE 500 MG: 500 INJECTION, SOLUTION INTRAVENOUS at 05:05

## 2022-01-12 RX ADMIN — METOPROLOL TARTRATE 5 MG: 5 INJECTION INTRAVENOUS at 00:26

## 2022-01-12 RX ADMIN — CIPROFLOXACIN 200 MG: 2 INJECTION, SOLUTION INTRAVENOUS at 10:19

## 2022-01-12 RX ADMIN — HYDRALAZINE HYDROCHLORIDE 10 MG: 20 INJECTION INTRAMUSCULAR; INTRAVENOUS at 05:05

## 2022-01-12 RX ADMIN — METOPROLOL TARTRATE 25 MG: 25 TABLET, FILM COATED ORAL at 13:44

## 2022-01-12 RX ADMIN — GABAPENTIN 300 MG: 300 CAPSULE ORAL at 00:26

## 2022-01-12 RX ADMIN — HYDRALAZINE HYDROCHLORIDE 75 MG: 25 TABLET, FILM COATED ORAL at 13:44

## 2022-01-12 RX ADMIN — ENOXAPARIN SODIUM 40 MG: 100 INJECTION SUBCUTANEOUS at 13:45

## 2022-01-12 RX ADMIN — PANTOPRAZOLE SODIUM 40 MG: 40 TABLET, DELAYED RELEASE ORAL at 10:03

## 2022-01-12 RX ADMIN — INSULIN GLARGINE 8 UNITS: 100 INJECTION, SOLUTION SUBCUTANEOUS at 10:03

## 2022-01-12 RX ADMIN — SODIUM CHLORIDE, PRESERVATIVE FREE 10 ML: 5 INJECTION INTRAVENOUS at 05:27

## 2022-01-12 RX ADMIN — HYDRALAZINE HYDROCHLORIDE 10 MG: 20 INJECTION INTRAMUSCULAR; INTRAVENOUS at 10:05

## 2022-01-12 RX ADMIN — Medication 4 UNITS: at 10:04

## 2022-01-12 RX ADMIN — AMLODIPINE BESYLATE 10 MG: 5 TABLET ORAL at 10:03

## 2022-01-12 RX ADMIN — METOPROLOL TARTRATE 5 MG: 5 INJECTION INTRAVENOUS at 05:27

## 2022-01-12 RX ADMIN — CETIRIZINE HYDROCHLORIDE 5 MG: 10 TABLET, FILM COATED ORAL at 10:03

## 2022-01-12 NOTE — ROUTINE PROCESS
Discharge instructions reviewed with the patient. Patient verbalized understanding and verified by teach back. All questions answered. IV discontinued, no redness, swelling or pain noted. Patient awaiting family for transportation home with an ETA of 30 minutes. Patient discharged off the unit via wheelchair. Patient armband removed and shredded.

## 2022-01-12 NOTE — PROGRESS NOTES
Pt is  and independent.  Pt has confirmed his PCP. Maye Knapp encourage ambulation to assist with identifying any potential transition of care needs.  Anticipate pt will transition home today with physician follow up.  CM to gisela to follow and assist as needed. Care Management Interventions  Mode of Transport at Discharge:  Other (see comment) (Family)  Transition of Care Consult (CM Consult): Discharge Planning  Health Maintenance Reviewed: Yes  Support Systems: Spouse/Significant Other  Confirm Follow Up Transport: Self  The Plan for Transition of Care is Related to the Following Treatment Goals : Home with physician follow up  Discharge Location  Discharge Placement: Home with family assistance

## 2022-01-12 NOTE — PROGRESS NOTES
1904 Assumed patient care at this time. Report received from Karla Moyer RN. Patient in bed in lowest position with wheels locked. Call light within reach. 2030 Assessment completed. See flow sheets.     1803 Written report given to charge night shift nurse Maryjo Carrion RN for Therese Stvee RN, (oncoming day shift nurse) by Bhavani Morales RN (offgoing nurse). Report included the following information: SBAR, Kardex, and MAR.

## 2022-01-12 NOTE — PROGRESS NOTES
Talked with Dr. Ori Miller to d.c pt home and f/u with him as -out-pt in one week. Discussed with pt, he agrees with the plan.

## 2022-01-12 NOTE — PROGRESS NOTES
RENAL CONSULT PROGRESS NOTE   2022    Patient:  Pramod Lemus  :  1975  Gender:  male  MRN #:  [id number]    Reason for Consult: Acute renal failure     Assessment: Pramod Lemus is a 42y.o. year old male with h/o  DM, HTN,Neuropathy admitted for colitis, reportedly had more than 10 episodes of watery diarrhea on  and h/o loose stool/diarrhoea for 1 yr and uncontrolled DM. Baseline serum creatinine on  was 0.94 mg/dl on admission it was 3.47 mg/dl and is 3.28 mg/dl on    Ct abdomen and pelvis does not have evidence of hydronephrosis and obstruction . He has decent urine output overnight . Hemodynamically stable without evidence of hypotension. No evidence of proteinuria and hematuria on urine analysis . Does not have clinically significant proteinuria so less likely glomerular disease   So based on presentation Acute renal failure most likely is  pre renal volume depletion due to watery diarrhoea vs toxin related ATN due to infection/colitis or oxalate nephropathy due to malabsorption        SUBJECTIVE:   -He says he feels better ,denied abdominal pain, nausea and diarrhea  Reports urinating fine  No other symptoms reported         PLAN:-   -Reports urinating fine, does not have symptoms of renal failure .  Renal function is stable for last few days with fluctuating creatinine , is slightly uptrending this morning   - Ensure that he does not retain urine  -- Intake and output  - Hold ACEI/ARBS and lasix  - Continue amlodipine for hypertension , continue hydralazine   - Avoid NSAIDS,.contrats and nephrotoxin  - Dose all meds including gabapentin  and antibiotics for current eGFR  - No clinical and metabolic indication of RRT  - He has risk factors for CKD, optimum diabetic and BP management   - May need renal biopsy as outpatient once he is stable from other medical perspective, if creatinine remains elevated      Rest of the management per primary team       Intake/Output Summary (Last 24 hours) at 1/12/2022 1523  Last data filed at 1/12/2022 0528  Gross per 24 hour   Intake 0 ml   Output    Net 0 ml         Objective:    Visit Vitals  BP (!) 150/92   Pulse (!) 101   Temp 98.4 °F (36.9 °C)   Resp 18   Ht 5' 6\" (1.676 m)   Wt 100.9 kg (222 lb 6.4 oz)   SpO2 96%   BMI 35.90 kg/m²       Physical Exam:    Pt awake  HEENT:-  no neck swelling  Lung: clear to auscultation  Ext: no edema         Laboratory Data:    Lab Results   Component Value Date    BUN 26 (H) 01/12/2022    BUN 25 (H) 01/11/2022    BUN 25 (H) 01/10/2022     01/12/2022     01/11/2022     01/10/2022    CO2 23 01/12/2022    CO2 24 01/11/2022    CO2 23 01/10/2022     Lab Results   Component Value Date    WBC 10.3 01/09/2022    HGB 11.3 (L) 01/09/2022    HCT 33.6 (L) 01/09/2022     No components found for: CALCIUM, PHOSPHORUS, MAGNESIUM  No results found for: HDL  No results found for: SPECIMENTYP, TURBIDITY, UGLU    Imaging Reveiwed:    CT abdomen/pelvis 12/30/21: Mild proximal sigmoid colonic wall thickening and inflammatory changes  suggesting an infectious or inflammatory colitis.     > No evidence of an organized or drainable fluid collection.     > No gross free intraperitoneal perforation.     2.  No evidence of urolithiasis or obstructive uropathy.           Jorge Hughes MD   [facility]

## 2022-01-12 NOTE — DISCHARGE SUMMARY
Discharge Summary    Patient: Danna Duffy MRN: [id number]  CSN: [id number]    YOB: 1975  Age: 42 y.o. Sex: male    DOA: 12/30/2021 LOS:  LOS: 13 days   Discharge Date:      Primary Care Provider:  Oz Crowe NP    Admission Diagnoses: Generalized abdominal pain [R10.84]  Colitis [K52.9]  Acute kidney injury ([facility] [N17.9]  Diabetes mellitus ([facility] [E11.9]    Discharge Diagnoses:    Hospital Problems  Date Reviewed: 12/30/2021          Codes Class Noted POA    Chest pain ICD-10-CM: R07.9  ICD-9-CM: 786.50  1/6/2022 Unknown        * (Principal) Colitis ICD-10-CM: K52.9  ICD-9-CM: 558.9  12/30/2021 Unknown        Diarrhea ICD-10-CM: R19.7  ICD-9-CM: 787.91  12/30/2021 Unknown        MILADIS (acute kidney injury) ([facility] ICD-10-CM: N17.9  ICD-9-CM: 584.9  12/30/2021 Unknown        DM (diabetes mellitus) ([facility] ICD-10-CM: E11.9  ICD-9-CM: 250.00  12/30/2021 Unknown        Nausea & vomiting ICD-10-CM: R11.2  ICD-9-CM: 787.01  12/30/2021 Unknown        Diabetes mellitus ([facility] ICD-10-CM: E11.9  ICD-9-CM: 250.00  12/30/2021 Unknown        Generalized abdominal pain ICD-10-CM: R10.84  ICD-9-CM: 789.07  12/30/2021 Unknown        Acute kidney injury [facility]) ICD-10-CM: N17.9  ICD-9-CM: 584.9  12/30/2021 Unknown              Discharge Condition: stable     Discharge Medications:     Current Discharge Medication List      START taking these medications    Details   amLODIPine (NORVASC) 10 mg tablet Take 1 Tablet by mouth daily. Qty: 30 Tablet, Refills: 0  Start date: 1/13/2022      cetirizine (ZYRTEC) 5 mg tablet Take 1 Tablet by mouth daily. Qty: 10 Tablet, Refills: 0  Start date: 1/13/2022      gabapentin (NEURONTIN) 300 mg capsule Take 1 Capsule by mouth daily. Max Daily Amount: 300 mg.   Qty: 30 Capsule, Refills: 0  Start date: 1/13/2022    Associated Diagnoses: Diabetic polyneuropathy associated with type 2 diabetes mellitus (HCC)      hydrALAZINE (APRESOLINE) 25 mg tablet Take 3 Tablets by mouth three (3) times daily for 30 days. Qty: 270 Tablet, Refills: 0  Start date: 1/12/2022, End date: 2/11/2022      insulin glargine (LANTUS) 100 unit/mL injection 10 Units by SubCUTAneous route daily. Qty: 1 mL, Refills: 0  Start date: 1/12/2022      metoprolol tartrate (LOPRESSOR) 25 mg tablet Take 1 Tablet by mouth every twelve (12) hours. Qty: 60 Tablet, Refills: 0  Start date: 1/12/2022      pantoprazole (Protonix) 40 mg tablet Take 1 Tablet by mouth daily. Qty: 30 Tablet, Refills: 0  Start date: 1/12/2022      SITagliptin (Januvia) 50 mg tablet Take 1 Tablet by mouth daily. Qty: 30 Tablet, Refills: 0  Start date: 1/12/2022         CONTINUE these medications which have NOT CHANGED    Details   ondansetron (ZOFRAN ODT) 8 mg disintegrating tablet Take 1 Tab by mouth every eight (8) hours as needed for Nausea for up to 12 doses. Qty: 12 Tab, Refills: 0         STOP taking these medications       insulin lispro (HumaLOG U-100 Insulin) 100 unit/mL injection Comments:   Reason for Stopping:         metFORMIN (GLUCOPHAGE) 1,000 mg tablet Comments:   Reason for Stopping:               Procedures : none   Consults: Nephrology      PHYSICAL EXAM   Visit Vitals  BP (!) 150/92   Pulse (!) 101   Temp 98.4 °F (36.9 °C)   Resp 18   Ht 5' 6\" (1.676 m)   Wt 100.9 kg (222 lb 6.4 oz)   SpO2 96%   BMI 35.90 kg/m²     General: Awake, cooperative, no acute distress    HEENT: NC, Atraumatic. PERRLA, EOMI. Anicteric sclerae. Lungs:  CTA Bilaterally. No Wheezing/Rhonchi/Rales. Heart:  Regular  rhythm,  No murmur, No Rubs, No Gallops  Abdomen: Soft, Non distended, Non tender. +Bowel sounds,   Extremities: No c/c/e  Psych:   Not anxious or agitated. Neurologic:  No acute neurological deficits. Admission HPI :     Willy Kim is a 42 y.o.  male who has hx of DM, HTN,Neuropathy, presents to ER with   3 day history of nausea, vomiting, and diarrhea.    His diarrhea described as mucusy and non bloody; has slowed down over one day but he is unable to keep and liquids or foods down. He is having intractable  Nausea and now with chest pain after throwing up. He thinks he may have choked on vomitus and is having chest pain with inspiration. Patient lives with wife and kids none are sick. He denies recent food poisoning or other sick contacts. In ER given Reglan, Phenergan; Zofran with persistent nausea and inability to keep food down  Patient is double vaccinated from [facility] denies exposures  Rapid covid in ER negative  Hospital Course :   Danna Duffy is a 42 y.o.  male who has hx of DM, HTN,Neuropathy is admitted due to MILADIS and colitis. MILADIS -nephrologist has been f/u, he received iv hydration and his renal  Function got mild improving and remained stable. He is cleared to be d/c per nephrologist and medication was adjusted for renal dose. Recommend to f/u with nephrologist in one week for bmp check also needs to avoid nsaids. He indicated verbal understanding.        He also presented Intractable nausea/ vomiting on admission, reglan was administrated, likely due to gastroparesis, need to f/u with gi as out-pt for further evaluation. He is able to be off reglan and n/v resolved and tolerated diabetic diet well. covid 19 rapid checked 2 times are  Negative.        Colitis : he completed abx for colitis treatment. No diarrhea and no abdomen pain before discharge.      DM2: continue lantus, hold metformin and f/u with pcp for medication adjustment.         HTN- metoprolol / norvasc. Hydralazine      Chest pain-resolved with negative stress test and negative v/q scan for PE. He remained hemodynamic stable before discharge. Discharge planning discussed with patient, pt agrees  with the plan and no questions and concerns at this point.        Activity: Activity as tolerated    Diet: Diabetic Diet    Follow-up: PCP and nephrologist and GI     Disposition: home     Minutes spent on discharge: 45 min       Labs: Results:       Chemistry Recent Labs     01/12/22  0125 01/11/22  0214 01/10/22  0135   * 192* 162*    142 141   K 4.2 3.9 3.6   * 112* 110   CO2 23 24 23   BUN 26* 25* 25*   CREA 3.07* 2.95* 2.94*   CA 8.2* 7.8* 7.8*   AGAP 6 6 8   BUCR 8* 8* 9*      CBC w/Diff No results for input(s): WBC, RBC, HGB, HCT, PLT, GRANS, LYMPH, EOS, HGBEXT, HCTEXT, PLTEXT in the last 72 hours. Cardiac Enzymes No results for input(s): CPK, CKND1, PARTH in the last 72 hours. No lab exists for component: CKRMB, TROIP   Coagulation No results for input(s): PTP, INR, APTT, INREXT in the last 72 hours. Lipid Panel No results found for: CHOL, CHOLPOCT, CHOLX, CHLST, CHOLV, 812415, HDL, HDLP, LDL, LDLC, DLDLP, 383757, VLDLC, VLDL, TGLX, TRIGL, TRIGP, TGLPOCT, CHHD, CHHDX   BNP No results for input(s): BNPP in the last 72 hours. Liver Enzymes No results for input(s): TP, ALB, TBIL, AP in the last 72 hours. No lab exists for component: SGOT, GPT, DBIL   Thyroid Studies No results found for: T4, T3U, TSH, TSHEXT       @micro    Significant Diagnostic Studies: NM LUNG SCAN PERF    Result Date: 1/7/2022  Perfusion only lung scan. History:  Vomiting, chest pain. Evaluation for potential pulmonary embolism. Comparison:  Chest radiograph same day. Technique: Perfusion imaging was performed after intravenous injection of 7.2 millicuries of Tc 57G MAA followed by imaging in multiple projections. Injection site: Right hand vein Findings: Perfusion imaging shows mild inhomogeneity along the peripheral aspect of the lungs. No focal segmental perfusion defect is seen. No perfusional abnormalities demonstrated to suggest the presence of pulmonary embolism.     XR CHEST PA LAT    Result Date: 12/30/2021  EXAM: XR CHEST PA LAT CLINICAL INDICATION/HISTORY: Chest pain -Additional: None COMPARISON: None TECHNIQUE: PA and lateral views of the chest _______________ FINDINGS: HEART AND MEDIASTINUM: Cardiac size and mediastinal contours are within normal limits LUNGS AND PLEURAL SPACES: No focal pneumonic consolidation, pneumothorax or pleural effusion BONY THORAX AND SOFT TISSUES: No acute osseous abnormality _______________     No acute findings in the chest.    XR ABD (KUB)    Result Date: 1/4/2022  EXAM: XR ABD (KUB) CLINICAL INDICATION/HISTORY: n/v   > Additional: Colitis COMPARISON: CT 12/30/2021 TECHNIQUE: AP supine projection of the abdomen onto exposures _______________ FINDINGS: Bowel gas pattern both nonobstructive and nonspecific. No gross free intraperitoneal perforation. No acute osseous abnormality. _______________     Nonobstructive and nonspecific bowel gas pattern without post acute radiographic abnormality. CT ABD PELV WO CONT    Result Date: 12/30/2021  EXAM: CT of the abdomen and pelvis INDICATION: Epigastric abdominal pain, chest pain COMPARISON: None. TECHNIQUE: Axial CT imaging of the abdomen and pelvis was performed without intravenous contrast. Multiplanar reformats were generated. One or more dose reduction techniques were used on this CT: automated exposure control, adjustment of the mAs and/or kVp according to patient size, and iterative reconstruction techniques. The specific techniques used on this CT exam have been documented in the patient's electronic medical record. Digital Imaging and Communications in Medicine (DICOM) format image data are available to nonaffiliated external healthcare facilities or entities on a secure, media free, reciprocally searchable basis with patient authorization for at least a 12-month period after this study. _______________ FINDINGS: LOWER CHEST: Streaky areas of atelectasis without evidence of consolidation or pleural effusion. Normal cardiac size. No pericardial effusion. LIVER, BILIARY: Liver is normal. No biliary dilation. Gallbladder is unremarkable. PANCREAS: Normal unenhanced appearance.  SPLEEN: Normal. ADRENALS: Normal. KIDNEYS/URETERS/BLADDER: No evidence of hydronephrosis involving either kidney. No nephrolithiasis. Urinary bladder unremarkable. PELVIC ORGANS: Unremarkable. VASCULATURE: Unremarkable LYMPH NODES: No enlarged lymph nodes. GASTROINTESTINAL TRACT: There is mild wall thickening involving the proximal portion of the sigmoid colon with accompanying right colonic stranding and edema. No evidence of an organized or drainable pericolonic fluid collection. No gross free intraperitoneal perforation. Normal appendix. BONES: No acute or aggressive osseous abnormalities identified. OTHER: Rectus diastases involving the anterior abdominal wall _______________     1. Mild proximal sigmoid colonic wall thickening and inflammatory changes suggesting an infectious or inflammatory colitis.   > No evidence of an organized or drainable fluid collection.   > No gross free intraperitoneal perforation. 2. No evidence of urolithiasis or obstructive uropathy. US RETROPERITONEUM COMP    Result Date: 1/4/2022  Retroperitoneal Ultrasound History: Acute kidney injury, not improving. Comparison: No prior study available for comparison. Technique: Multiple gray scale sonographic images of the kidneys and bladder were obtained. Additional color Doppler and the Doppler waveform images were obtained . Findings: The right kidney measures 13.8 cm in length and is normal in echotexture. No focal lesions are seen. Increased parenchymal echogenicity. The left kidney measures 12.8 cm in length and is normal in echotexture. No focal lesions are seen. Increased parenchymal echogenicity. The bladder is unremarkable. The estimated volume of the bladder pre-void is 201 mL. The patient did not void for calculation of a post void residual.     1. No evidence of obstructive uropathy. 2. Increased parenchymal echogenicity as can be seen with medical renal disease.      XR CHEST PORT    Result Date: 1/7/2022  EXAM: XR CHEST PORT CLINICAL INDICATION/HISTORY: Shortness of breath, chest pain -Additional: None COMPARISON: CT abdomen/pelvis 12/30/2021 TECHNIQUE: Frontal view of the chest _______________ FINDINGS: HEART AND MEDIASTINUM: Normal cardiac size and mediastinal contours. LUNGS AND PLEURAL SPACES: Lungs are mildly underexpanded. No focal pneumonic consolidation. No evidence of pneumothorax or pleural effusion. Linear areas of opacity noted at each lung base. BONY THORAX AND SOFT TISSUES: No acute osseous abnormality _______________     Mildly underexpanded lungs with basilar atelectasis without superimposed acute radiographic abnormality. ECHO ADULT COMPLETE    Result Date: 1/1/2022    Left Ventricle: Left ventricle size is normal. Mildly increased wall thickness. Normal wall motion. Normal left ventricular systolic function with a visually estimated EF of 60 - 65%. Grade II diastolic dysfunction with increased LAP.   Mitral Valve: Mildly thickened leaflets. Mild transvalvular regurgitation. No stenosis.   Tricuspid Valve: Tricuspid regurgitation is inadequate for estimation of right ventricular systolic pressure.   IVC/SVC: IVC diameter is less than or equal to 21 mm and decreases greater than 50% during inspiration; therefore the estimated right atrial pressure is normal (~3 mmHg). NUCLEAR CARDIAC STRESS TEST    Result Date: 1/5/2022  1) Lexiscan stress test is negative for ischemia. 2) Baseline EKG revealed Sinus rhythm, no ST T changes. There was no ST changes after injection of Regadenoson or in recovery. 3) There was no perfusion defect in rest or stress study. 4) Calculated LVEF 75%. TID 0.94 5) Can not comment on exercise capacity due to pharmacological study. 6) No previous study to compare.              [facility]     CC: Genoveva Cain NP

## 2022-01-13 NOTE — PROGRESS NOTES
0804:Received SBAR from off going shift RN. Patient alert and oriented sitting upright in bed no signs of distress. Patient tolerated  all scheduled medications no c/o pain. Patient awaiting to be discharged today. 1315:Patient discharged today instructions reviewed by discharge nurse.

## 2022-01-18 NOTE — ADT AUTH CERT NOTES
1/3 - 1/11        Utilization Reviews         Renal Failure, Acute - Care Day 13 (1/11/2022) by Chaya Duval RN       Review Status Review Entered   Completed 1/18/2022 09:36      Criteria Review      Care Day: 13 Care Date: 1/11/2022 Level of Care: Inpatient Floor    Guideline Day 4    Level Of Care    ( ) Floor to discharge    1/18/2022 09:33:06 EST by Nadia Vanegas      remote telemetry unit    Clinical Status    ( ) * Hemodynamic stability    1/18/2022 09:32:55 EST by Nadia Vanegas      -103    (X) * Mental status at baseline    (X) * Tachypnea absent    (X) * Hypoxemia absent    1/18/2022 09:32:55 EST by Nadia Vanegas      on room air    (X) * Etiology requiring inpatient treatment absent    ( ) * Electrolyte abnormalities absent or acceptable for next level of care    1/18/2022 09:32:55 EST by Nadia Vanegas      +hypocalcemia, calcium 7.8 today    (X) * Acid-base abnormalities absent    (X) * Volume status at baseline or acceptable for next level of care    (X) * Diet tolerated    (X) * Dialysis not needed or access and plan established    ( ) * Discharge plans and education understood    Activity    (X) * Ambulatory or acceptable for next level of care    Routes    (X) * Oral hydration    (X) * Oral medications or regimen acceptable for next level of care    (X) * Oral diet or acceptable for next level of care    Interventions    ( ) Possibly establish long-term access for dialysis    1/18/2022 09:32:55 EST by Nadia Vanegas      Nephrology plan of care: very slow improvement in renal functions, with some fluctuating in creatinine. Patient reports urinating fine. May need renal biopsy as outpatient once he is stable from other medical perspective, if creatinine remains high.     (X) Monitor electrolytes, renal function tests, acid-base, and volume status    1/18/2022 09:32:55 EST by Nadia Vanegas      MILADIS -improving very slowly still above 2 :    Glucose: 192 (H)  BUN: 25 (H)  Creatinine: 2.95 (H)  Calcium: 7.8 (L)    (X) Dietary and fluid management counseling    1/18/2022 09:36:04 EST by Shante Diaz      Diabetes/ Glycemic Control Plan of Care:  1.      MILADIS  2. Diffuse abdominal pain    3. Colitis    4. Type 2 diabetes mellitus with hyperglycemia,      Recommendations:                  Recommend starting Mealtime Humalog - 3 units AC    Medications    (X) Possible medical therapies    1/18/2022 09:32:55 EST by Shante Diaz      1. Colitis: IV abx: IV ciprofloxacin (CIPRO) 200 mg in D5W IVPB (premix) EVERY 12 HOURS/ IV Flagyl 500mg every 8 hours. 2. Uncontrolled HTN: On IV Hydralazine/ IV Metoprolol    * Milestone   Additional Notes   1/11      -NEPHROLOGY:      Assessment: Sasha Natarajan is a 42y.o. year old male with h/o  DM, HTN,Neuropathy admitted for colitis, reportedly had more than 10 episodes of watery diarrhea on 12/27 and h/o loose stool/diarrhoea for 1 yr and uncontrolled DM.        Baseline serum creatinine on 119/21 was 0.94 mg/dl on admission it was 3.47 mg/dl and is 3.28 mg/dl on 11/22    Ct abdomen and pelvis does not have evidence of hydronephrosis and obstruction . He has decent urine output overnight . Hemodynamically stable without evidence of hypotension. No evidence of proteinuria and hematuria on urine analysis .  Does not have clinically significant proteinuria so less likely glomerular disease    So based on presentation Acute renal failure most likely is  pre renal volume depletion due to watery diarrhoea vs toxin related ATN due to infection/colitis or oxalate nephropathy due to malabsorption             SUBJECTIVE:    -He says he feels a lot better, denied abdominal pain, nausea and diarrhea   Reports urinating fine , no UOP recorded    Bp stable, no chest pain                 PLAN:-    -Reports urinating fine, very slow improvement in renal functions, with some fluctuating in creatinine     - Ensure that he does not retain urine   -- Intake and output   - Hold ACEI/ARBS and lasix   - Continue amlodipine for hypertension , continue hydralazine    - Avoid NSAIDS,.contrats and nephrotoxin   - Dose all meds including gabapentin  and antibiotics for current eGFR   - No clinical and metabolic indication of RRT   - He has risk factors for CKD, optimum diabetic and BP management    - May need renal biopsy as outpatient once he is stable from other medical perspective, if creatinine remains elevated              -INTERNAL MEDICINE:      Intractable nausea/ vomiting-   Resolved    covid 19 rapid 2 times negative    Continue symptom tx         Abdominal pain. -no pain now            Colitis    No diarrhea    Continue tx         MILADIS -improving very slowly still above 2        DM2   Continue ssi ,increase lantus to 8         HTN- uncontrolled   On metoprolol and norvasc. Hold reglan today to see if n/v coming back if not change to po metoprolol        Grade II diastolic dysfunction- compensated        Chest pain-no pain today     Trop wnl     Stress test negative for ischemia    V/q scan no PE        Subjective: I feel fine , no vomiting            Disposition :tbd,    Review of systems:       General: No fevers or chills. Cardiovascular: No chest pain or pressure. No palpitations. Pulmonary: No shortness of breath. Gastrointestinal: +nausea, no vomiting.           Visit Vitals   BP (!) 143/80 (BP 1 Location: Left upper arm, BP Patient Position: At rest)   Pulse (!) 101   Temp 98.6 °F (37 °C)   Resp 16   SpO2 95%  ROOM AIR          Current Shift:  No intake/output data recorded. Last three shifts:  No intake/output data recorded.       Physical Exam:   General:         WD, WN.  Alert, cooperative, no acute distress     HEENT:           NC, Atraumatic.  PERRLA, anicteric sclerae. Lungs:            CTA Bilaterally. No Wheezing/Rhonchi/Rales.    Heart:              Regular  rhythm,  No murmur, No Rubs, No Gallops   Abdomen:      Soft, Non distended, Non tender.  +Bowel sounds,    Extremities:   No c/c/e   Psych:              Not anxious or agitated. Neurologic:     No acute neurological deficit.                LABS:      1/11/2022 02:14   Sodium: 142   Potassium: 3.9   Chloride: 112 (H)   CO2: 24   Anion gap: 6   Glucose: 192 (H)   BUN: 25 (H)   Creatinine: 2.95 (H)   BUN/Creatinine ratio: 8 (L)   Calcium: 7.8 (L)   Magnesium: 2.0   GFR est non-AA: 23 (L)   GFR est AA: 28 (L)      1/11/2022 07:26   GLUCOSE,FAST - POC: 468 (H)      1/11/2022 12:10   GLUCOSE,FAST - POC: 177 (H)      1/11/2022 16:51   GLUCOSE,FAST - POC: 889 (H)      1/11/2022 22:21   GLUCOSE,FAST - POC: 256 (H)              Renal Failure, Acute - Care Day 12 (1/10/2022) by Dru Hughes RN       Review Status Review Entered   Completed 1/18/2022 09:19      Criteria Review      Care Day: 12 Care Date: 1/10/2022 Level of Care: Inpatient Floor    Guideline Day 4    Clinical Status    (X) * Hemodynamic stability    (X) * Mental status at baseline    (X) * Tachypnea absent    (X) * Hypoxemia absent    1/18/2022 09:19:58 EST by Chanel Hernández      ON ROOM AIR    ( ) * Etiology requiring inpatient treatment absent    ( ) * Electrolyte abnormalities absent or acceptable for next level of care    1/18/2022 09:19:58 EST by Chanel Hernández      +hypocalcemia: Calicum dropped to 7.8 today    (X) * Acid-base abnormalities absent    (X) * Volume status at baseline or acceptable for next level of care    (X) * Diet tolerated    1/18/2022 09:19:58 EST by Chanel Hernández      nausea better. IV Reglan, oral Protonix.     (X) * Dialysis not needed or access and plan established    ( ) * Discharge plans and education understood    Activity    (X) * Ambulatory or acceptable for next level of care    Routes    (X) * Oral hydration    (X) * Oral medications or regimen acceptable for next level of care    1/18/2022 09:19:58 EST by Chanel Hernández      Continue IV abx therapy: IV Flagyl 500mg every 8 hours, IV ciprofloxacin (CIPRO) 200 mg in D5W IVPB (premix) EVERY 12 HOURS    (X) * Oral diet or acceptable for next level of care    Interventions    (X) Monitor electrolytes, renal function tests, acid-base, and volume status    1/18/2022 09:19:58 EST by Armin Gonzalez      MILADIS -improving very slowly :      Glucose: 162 (H)  BUN: 25 (H)  Creatinine: 2.94 (H)  Calcium: 7.8 (L)    Medications    (X) Possible medical therapies    1/18/2022 09:19:58 EST by Armin Gonzalez      1. DM2 - Continue ssi ,increase lantus to 8 .    2. Uncontrolled HTN: On metoprolol and norvasc. Hold reglan today to see if n/v coming back if not change to po metoprolol    * Milestone   Additional Notes   1/10         -INTERNAL MEDICINE:      Intractable nausea/ vomiting-   Improving, will hold reglan now, to see if he still n/v -if recurrent will give gi a call    covid 19 rapid 2 times negative    Continue symptom tx             Abdominal pain. -no pain now            Colitis    No diarrhea    Continue tx         MILADIS -improving very slowly        DM2   Continue ssi ,increase lantus to 8         HTN- uncontrolled   On metoprolol and norvasc. Hold reglan today to see if n/v coming back if not change to po metoprolol        Grade II diastolic dysfunction- compensated        Chest pain-no pain today     Trop wnl     Stress test negative for ischemia    V/q scan no PE        Subjective: nausea better. Would like to try pills        Case discussed with renal    Disposition :tbd,    Review of systems:       General: No fevers or chills. Cardiovascular: No chest pain or pressure. No palpitations. Pulmonary: No shortness of breath. Gastrointestinal: +nausea, no vomiting.           Visit Vitals   BP (!) 141/85   Pulse 89   Temp 98.4 °F (36.9 °C)   Resp 17   O2 97%             Current Shift:  No intake/output data recorded.    Last three shifts:  01/08 1901 - 01/10 0700   In: -    Out: 800 [Urine:700]       Physical Exam:   General:         WD, WN.  Alert, cooperative, no acute distress     HEENT:           NC, Atraumatic.  PERRLA, anicteric sclerae. Lungs:            CTA Bilaterally. No Wheezing/Rhonchi/Rales. Heart:              Regular  rhythm,  No murmur, No Rubs, No Gallops   Abdomen:      Soft, Non distended, Non tender.  +Bowel sounds,    Extremities:   No c/c/e   Psych:              Not anxious or agitated. Neurologic:     No acute neurological deficit.                   -NEPHROLOGY:   Assessment: Pramod Lemus is a 42y.o. year old male with h/o  DM, HTN,Neuropathy admitted for colitis, reportedly had more than 10 episodes of watery diarrhea on 12/27 and h/o loose stool/diarrhoea for 1 yr and uncontrolled DM.        Baseline serum creatinine on 119/21 was 0.94 mg/dl on admission it was 3.47 mg/dl and is 3.28 mg/dl on 11/22    Ct abdomen and pelvis does not have evidence of hydronephrosis and obstruction . He has decent urine output overnight . Hemodynamically stable without evidence of hypotension. No evidence of proteinuria and hematuria on urine analysis .  Does not have clinically significant proteinuria so less likely glomerular disease    So based on presentation Acute renal failure most likely is  pre renal volume depletion due to watery diarrhoea vs toxin related ATN due to infection/colitis or oxalate nephropathy due to malabsorption             SUBJECTIVE:    -today he says he feels better, denied abdominal pain, nausea and diarrhea   Reports urinating fine    Bp stable                PLAN:-    -Reports urinating fine, very slow improvement in renal functions    - Ensure that he does not retain urine   -- Intake and output   - Hold ACEI/ARBS and lasix   - Continue amlodipine for hypertension , continue hydralazine    - Avoid NSAIDS,.contrats and nephrotoxin   - Dose all meds including gabapentin  and antibiotics for current eGFR   - No clinical and metabolic indication of RRT   - He has risk factors for CKD, optimum diabetic and BP management    - Acidosis improving, stop sodium bicarbonate , stop NS infusion, encourage oral water             LABS:      1/10/2022 01:35   Sodium: 141   Potassium: 3.6   Chloride: 110   CO2: 23   Anion gap: 8   Glucose: 162 (H)   BUN: 25 (H)   Creatinine: 2.94 (H)   BUN/Creatinine ratio: 9 (L)   Calcium: 7.8 (L)   Magnesium: 2.0   GFR est non-AA: 23 (L)   GFR est AA: 28 (L)      1/10/2022 07:01   GLUCOSE,FAST - POC: 160 (H)      1/10/2022 11:05   GLUCOSE,FAST - POC: 237 (H)      1/10/2022 16:20   GLUCOSE,FAST - POC: 140 (H)      1/10/2022 21:09   GLUCOSE,FAST - POC: 238 (H)           Renal Failure, Acute - Care Day 11 (1/9/2022) by Clint Salas RN       Review Status Review Entered   Completed 1/18/2022 09:09      Criteria Review      Care Day: 11 Care Date: 1/9/2022 Level of Care: Inpatient Floor    Guideline Day 4    Clinical Status    ( ) * Hemodynamic stability    1/18/2022 09:09:19 EST by Cornelia Pena      -110    (X) * Mental status at baseline    (X) * Tachypnea absent    (X) * Hypoxemia absent    1/18/2022 09:09:19 EST by Cornelia Pena      on room air    ( ) * Etiology requiring inpatient treatment absent    1/18/2022 09:09:19 EST by Hilda Mullins Chief complaint:  colitis,  emanuel , dm    ( ) * Electrolyte abnormalities absent or acceptable for next level of care    1/18/2022 09:09:19 EST by Cornelia Pena      +hypocalcemia    ( ) * Acid-base abnormalities absent    1/18/2022 09:09:19 EST by Cornelia Pena      oral sodium bicarbonate tablet 1,300 mg 2 TIMES DAILY    (X) * Volume status at baseline or acceptable for next level of care    (X) * Diet tolerated    1/18/2022 09:09:19 EST by Cornelia Pena      tolerating pureed diet.  Still requiring IV antiemetics: IV metoclopramide HCl (REGLAN) injection 2.5 mg3 TIMES DAILY BEFORE MEALS x3/ IV Zofran 4mg x 1    (X) * Dialysis not needed or access and plan established    ( ) * Discharge plans and education understood    Activity    ( ) * Ambulatory or acceptable for next level of care    Routes    ( ) * Oral hydration    1/18/2022 09:09:19 EST by Lara Radford      Continue IVF: NS @ 75ml/hr    (X) * Oral medications or regimen acceptable for next level of care    1/18/2022 09:09:19 EST by Lara Radford      Continue IV antibiotic therapy: IV ciprofloxacin (CIPRO) 200 mg in D5W IVPB (premix) EVERY 12 HOURS/ IV Flagyl 500mg every8 hours    (X) * Oral diet or acceptable for next level of care    Interventions    ( ) Possibly establish long-term access for dialysis    1/18/2022 09:09:19 EST by Lara Radford      Nephrology plan of care: based on presentation Acute renal failure most likely is  pre renal volume depletion due to watery diarrhoea vs toxin related ATN due to infection/colitis. Continue supportive care. Monitor renal function. (X) Monitor electrolytes, renal function tests, acid-base, and volume status    1/18/2022 09:09:19 EST by Lara Radford      1/9/2022 02:15  Glucose: 125 (H)  BUN: 25 (H)  Creatinine: 2.97 (H)  Calcium: 8.3 (L)    Medications    (X) Possible medical therapies    1/18/2022 09:09:19 EST by Lara Radford      HTN control: IV hydrALAZINE (APRESOLINE) 20 mg/mL injection 10 mg EVERY 6 HOURS x 4 doses/ IV metoprolol (LOPRESSOR) injection 5 mg   EVERY 6 HOURS x 3 doses    * Milestone   Additional Notes   1/9         LABS:      1/9/2022 02:15   WBC: 10.3   NRBC: 0.0   RBC: 4.28 (L)   HGB: 11.3 (L)   HCT: 33.6 (L)   MCV: 78.5   MCH: 26.4   MCHC: 33.6   RDW: 12.5   PLATELET: 287   MPV: 9.1 (L)   NEUTROPHILS: 67   LYMPHOCYTES: 19 (L)   MONOCYTES: 11 (H)   EOSINOPHILS: 3   BASOPHILS: 0   IMMATURE GRANULOCYTES: 0   DF: AUTOMATED   ABSOLUTE NRBC: 0.00   ABS. NEUTROPHILS: 6.9   ABS. IMM. GRANS.: 0.0   ABS. LYMPHOCYTES: 1.9   ABS. MONOCYTES: 1.1   ABS. EOSINOPHILS: 0.3   ABS.  BASOPHILS: 0.0   Sodium: 142   Potassium: 3.9   Chloride: 110 CO2: 23   Anion gap: 9   Glucose: 125 (H)   BUN: 25 (H)   Creatinine: 2.97 (H)   BUN/Creatinine ratio: 8 (L)   Calcium: 8.3 (L)   Magnesium: 2.0   GFR est non-AA: 23 (L)   GFR est AA: 28 (L)   Bilirubin, total: 0.4   Protein, total: 5.4 (L)   Albumin: 2.7 (L)   Globulin: 2.7   A-G Ratio: 1.0   ALT: 22   AST: 15   Alk. phosphatase: 65      1/9/2022 06:55   GLUCOSE,FAST - POC: 135 (H)      1/9/2022 11:34   GLUCOSE,FAST - POC: 207 (H)      1/9/2022 16:03   GLUCOSE,FAST - POC: 129 (H)      1/9/2022 17:27   GLUCOSE,FAST - POC: 117 (H)      1/9/2022 21:17   GLUCOSE,FAST - POC: 156 (H)               -NEPHROLOGY:      Assessment:           Sasha Natarajan is a 42y.o. year old male with h/o  DM, HTN,Neuropathy admitted for colitis, reportedly had more than 10 episodes of watery diarrhea on 12/27 and h/o loose stool/diarrhoea for 1 yr and uncontrolled DM.        Baseline serum creatinine on 119/21 was 0.94 mg/dl on admission it was 3.47 mg/dl and is 3.28 mg/dl on 11/22    Ct abdomen and pelvis does not have evidence of hydronephrosis and obstruction . He has decent urine output overnight . Hemodynamically stable without evidence of hypotension. No evidence of proteinuria and hematuria on urine analysis . Does not have clinically significant proteinuria so less likely glomerular disease    So based on presentation Acute renal failure most likely is  pre renal volume depletion due to watery diarrhoea vs toxin related ATN due to infection/colitis            SUBJECTIVE:    -saw him after half part of  stress test , he says he still has abdominal discomfort and chest pain . Diarrhea improved . 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 Will take some pureed foods  Which is going better   Suspect this is gastroparesis       MILADIS: this is stable and followed by renal           Chief complaint:  colitis miladis , dm ,            Subjective:       He says pureed food has gone better           Review of systems:       General: No fevers or chills. Cardiovascular: No chest pain or pressure. No palpitations. Pulmonary: No shortness of breath. Gastrointestinal: No nausea, vomiting.           Visit Vitals   /89   Pulse 104   Temp 99.1   Resp 18   SpO2 97%  ROOM AIR          Current Shift:  No intake/output data recorded. Last three shifts:  01/07 1901 - 01/09 0700   In: -    Out: 1800 [Urine:1700]       Physical Exam:   General:         NAD, AAOx3. Non-toxic. HEENT:           NC/AT.  PERRLA, EOMI.  MMM. Lungs:            Nml inspection. CTA B/L. No wheezing, rales or rhonchi. Heart:              S1S2 RRR,  PMI mid 5th IC space. No M/RG. Abdomen:      Soft, NT/ND.  BS+. No peritoneal signs. Extremities:   No C/C/E. Psych:            Nml affect.    Neurologic:    2-12 intact.  Strength 5/5 throughout.  Sensation symmetrical.             --Current Facility-Administered Medications   Medication Dose Route Frequency Provider Last Rate Last Admin   · metoprolol (LOPRESSOR) injection 5 mg 5 mg IntraVENous Q6H Lucio Soria MD 5 mg at 01/09/22 0627   · insulin glargine (LANTUS) injection 5 Units 5 Units SubCUTAneous DAILY Katya Parish MD 5 Units at 01/09/22 5574   · hydrALAZINE (APRESOLINE) 20 mg/mL injection 10 mg 10 mg IntraVENous Q6H Katya Parish MD 10 mg at 01/09/22 0357   · cetirizine (ZYRTEC) tablet 5 mg 5 mg Oral DAILY Katya Parish MD 5 mg at 01/09/22 0930   · metroNIDAZOLE (FLAGYL) IVPB premix 500 mg 500 mg IntraVENous Q8H Katya Parish  mL/hr at 01/09/22 0407 500 mg at 01/09/22 0407   · ciprofloxacin (CIPRO) 200 mg in D5W IVPB (premix) 200 mg IntraVENous Q12H Katya Parish  mL/hr at 01/09/22 0930 200 mg at 01/09/22 0930   · metoclopramide HCl (REGLAN) injection 2.5 mg 2.5 mg IntraVENous Denise Duong MD 2.5 mg at 01/09/22 4054   · sodium bicarbonate tablet 1,300 mg 1,300 mg Oral BID Donald Enriquez MD 1,300 mg at 01/06/22 2357   · [Held by provider] hydrALAZINE (APRESOLINE) tablet 75 mg 75 mg Oral TID Asad Cordova MD 75 mg at 01/06/22 0535   · pantoprazole (PROTONIX) tablet 40 mg 40 mg Oral Q12H Glenn Soria MD 40 mg at 01/09/22 0929   · morphine injection 2 mg 2 mg IntraVENous Q6H PRN Ole Matte, DO 2 mg at 01/08/22 1136   · amLODIPine (NORVASC) tablet 10 mg 10 mg Oral DAILY Ole Matte, DO 10 mg at 01/09/22 2972   · insulin lispro (HUMALOG) injection SubCUTAneous AC&HS Glenn Soria MD 2 Units at 01/08/22 0851   · glucose chewable tablet 16 g 4 Tablet Oral PRN Glenn Soria MD    · glucagon (GLUCAGEN) injection 1 mg 1 mg IntraMUSCular PRN Glenn Soria MD    · dextrose (D50W) injection syrg 12.5-25 g 25-50 mL IntraVENous PRN Glenn Soria MD    · 0.9% sodium chloride infusion 75 mL/hr IntraVENous CONTINUOUS Donald Enriquez MD 75 mL/hr at 01/09/22 0403 75 mL/hr at 01/09/22 0403   · sodium chloride (NS) flush 5-40 mL 5-40 mL IntraVENous Q8H Glenn Soria MD 10 mL at 01/09/22 0635   · sodium chloride (NS) flush 5-40 mL 5-40 mL IntraVENous PRN Glenn Soria MD    · acetaminophen (TYLENOL) tablet 650 mg 650 mg Oral Q6H PRN Glenn Soria  mg at 01/02/22 1827     Or   · acetaminophen (TYLENOL) suppository 650 mg 650 mg Rectal Q6H PRN Glenn Soria MD    · polyethylene glycol (MIRALAX) packet 17 g 17 g Oral DAILY PRN Glenn Soria MD    · ondansetron (ZOFRAN ODT) tablet 4 mg 4 mg Oral Q8H PRN Glenn Soria MD      Or   · ondansetron (ZOFRAN) injection 4 mg 4 mg IntraVENous Q6H PRN Gelnn Soria MD 4 mg at 01/09/22 0401   · enoxaparin (LOVENOX) injection 30 mg 30 mg SubCUTAneous DAILY Glenn Soria MD 30 mg at 01/08/22 1136   · gabapentin (NEURONTIN) capsule 300 mg 300 mg Oral BID Soni Soria  mg at 01/06/22 2358               Renal Failure, Acute - Care Day 10 (1/8/2022) by Avel Varghese RN       Review Status Review Entered   Completed 1/18/2022 08:58      Criteria Review      Care Day: 10 Care Date: 1/8/2022 Level of Care: Inpatient Floor    Guideline Day 4    Clinical Status    ( ) * Hemodynamic stability    1/18/2022 08:58:37 EST by Dajuan Griffin      hr 101-102    (X) * Mental status at baseline    ( ) * Tachypnea absent    1/18/2022 08:58:37 EST by Dajuan Griffin      rr 20-22    (X) * Hypoxemia absent    1/18/2022 08:58:37 EST by Dajuan Griffin      on ra    ( ) * Etiology requiring inpatient treatment absent    1/18/2022 08:58:37 EST by Ye Blankenship +Colitis/ Diarrhea/  MILADIS (acute kidney injury) / DM (diabetes mellitus)/ Nausea & vomiting    ( ) * Electrolyte abnormalities absent or acceptable for next level of care    1/18/2022 08:58:37 EST by Dajuan Griffin      +hypocalcemia    (X) * Acid-base abnormalities absent    ( ) * Volume status at baseline or acceptable for next level of care    ( ) * Diet tolerated    1/18/2022 08:58:37 EST by Dajuan Griffin      + Nausea/Vomiting:  refusing all his oral medications, refusing all meds. Will take some pureed foods. Suspect this is gastroparesis.     Antiemetics: IV metoclopramide HCl (REGLAN) injection 2.5 mg   3 TIMES DAILY BEFORE MEALS x 3    (X) * Dialysis not needed or access and plan established    ( ) * Discharge plans and education understood    Activity    ( ) * Ambulatory or acceptable for next level of care    1/18/2022 08:58:37 EST by Dajuan Griffin      + Diarrhea, calcium oxalate nephropathy due to long standing diarrhea    Routes    ( ) * Oral hydration    1/18/2022 08:58:37 EST by Dajuan Griffin      Continue IVF: NS @ 75ml/hr    ( ) * Oral medications or regimen acceptable for next level of care    1/18/2022 08:58:37 EST by George Crespo      IV medications: IV ciprofloxacin (CIPRO) 200 mg in D5W IVPB (premix) EVERY 12 HOURS/ IV Flagyl 500mg every 8 hours/ IV Morphine 2,g x 1    ( ) * Oral diet or acceptable for next level of care    Interventions    (X) Monitor electrolytes, renal function tests, acid-base, and volume status    1/18/2022 08:58:37 EST by George Crespo      1/8/2022 01:55  Glucose: 146 (H)  BUN: 29 (H)  Creatinine: 3.06 (H)  Calcium: 8.4 (L)    Medications    (X) Possible medical therapies    1/18/2022 08:58:37 EST by George Crespo      HTN control: continue IV hydrALAZINE (APRESOLINE) 20 mg/mL injection 10 mg EVERY 6 HOURS x 4/ IV metoprolol (LOPRESSOR) injection 5 mg EVERY 6 HOURS x 5    * Milestone   Additional Notes   1/8      -NEPHROLOGY:      Vidhya Reese is a 42y.o. year old male with h/o  DM, HTN,Neuropathy admitted for colitis, reportedly had more than 10 episodes of watery diarrhea on 12/27 and h/o loose stool/diarrhoea for 1 yr and uncontrolled DM.        Baseline serum creatinine on 119/21 was 0.94 mg/dl on admission it was 3.47 mg/dl and is 3.28 mg/dl on 11/22    Ct abdomen and pelvis does not have evidence of hydronephrosis and obstruction . He has decent urine output overnight . Hemodynamically stable without evidence of hypotension. No evidence of proteinuria and hematuria on urine analysis . Does not have clinically significant proteinuria so less likely glomerular disease    So based on presentation Acute renal failure most likely is  pre renal volume depletion due to watery diarrhoea vs toxin related ATN due to infection/colitis            SUBJECTIVE:    -saw him after half part of  stress test , he says he still has abdominal discomfort and chest pain . Diarrhea improved .    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 Will take some pureed foods   Suspect this is gastroparesis       MILADIS: this is stable           Chief complaint:  colitis miladis , dm ,            Subjective:       Nauseated and lying on side. No acute distress       Review of systems:       General: No fevers or chills. Cardiovascular: No chest pain or pressure. No palpitations. Pulmonary: No shortness of breath. Gastrointestinal: No nausea, vomiting.           Visit Vitals   BP (!) 155/90   Pulse (!) 101   Temp 99.6 °F (37.6 °C)   Resp 20   SpO2 96% RA          Current Shift:  No intake/output data recorded. Last three shifts:  01/06 1901 - 01/08 0700   In: 1040 [P.O.:140; I.V.:900]   Out: 3050 [Urine:2800]       Physical Exam:   General:         NAD, AAOx3. Non-toxic. HEENT:           NC/AT.  PERRLA, EOMI.  MMM. Lungs:            Nml inspection. CTA B/L. No wheezing, rales or rhonchi. Heart:              S1S2 RRR,  PMI mid 5th IC space. No M/RG. Abdomen:      Soft, NT/ND.  BS+. No peritoneal signs. Extremities:   No C/C/E. Psych:            Nml affect.    Neurologic:    2-12 intact.  Strength 5/5 throughout.  Sensation symmetrical.                LABS:      1/8/2022 01:55   Sodium: 142   Potassium: 3.8   Chloride: 111   CO2: 24   Anion gap: 7   Glucose: 146 (H)   BUN: 29 (H)   Creatinine: 3.06 (H)   BUN/Creatinine ratio: 9 (L)   Calcium: 8.4 (L)   Magnesium: 1.9   GFR est non-AA: 22 (L)   GFR est AA: 27 (L)      1/8/2022 07:40   GLUCOSE,FAST - POC: 168 (H)      1/8/2022 11:48   GLUCOSE,FAST - POC: 158 (H)      1/8/2022 16:15   GLUCOSE,FAST - POC: 133 (H)      1/8/2022 21:16   GLUCOSE,FAST - POC: 106            --Current Facility-Administered Medications   Medication Dose Route Frequency Provider Last Rate Last Admin   · metoprolol (LOPRESSOR) injection 5 mg 5 mg IntraVENous Q6H Iva Soria MD 5 mg at 01/08/22 1136   · insulin glargine (LANTUS) injection 5 Units 5 Units SubCUTAneous DAILY Ellie Winn MD 5 Units at 01/08/22 0853   · hydrALAZINE (APRESOLINE) 20 mg/mL injection 10 mg 10 mg IntraVENous Q6H Ellie Winn MD 10 mg at 01/08/22 0932   · cetirizine (ZYRTEC) tablet 5 mg 5 mg Oral DAILY Ellie Winn MD 5 mg at 01/07/22 0929   · metroNIDAZOLE (FLAGYL) IVPB premix 500 mg 500 mg IntraVENous Q8H Ellie Winn  mL/hr at 01/08/22 0506 500 mg at 01/08/22 0506   · ciprofloxacin (CIPRO) 200 mg in D5W IVPB (premix) 200 mg IntraVENous Q12H Ellie Winn  mL/hr at 01/08/22 0855 200 mg at 01/08/22 0855   · metoclopramide HCl (REGLAN) injection 2.5 mg 2.5 mg IntraVENous Jez Keenan MD 2.5 mg at 01/08/22 4175   · sodium bicarbonate tablet 1,300 mg 1,300 mg Oral BID Donald Enriquez MD 1,300 mg at 01/06/22 2357   · [Held by provider] hydrALAZINE (APRESOLINE) tablet 75 mg 75 mg Oral TID Ellie Winn MD 75 mg at 01/06/22 0535   · pantoprazole (PROTONIX) tablet 40 mg 40 mg Oral Q12H Iva Soria MD 40 mg at 01/08/22 0854   · morphine injection 2 mg 2 mg IntraVENous Q6H PRN Walker Nipper, DO 2 mg at 01/08/22 1136   · amLODIPine (NORVASC) tablet 10 mg 10 mg Oral DAILY Walker Nipper, DO 10 mg at 01/07/22 5522   · insulin lispro (HUMALOG) injection SubCUTAneous AC&HS Iva Soria MD 2 Units at 01/08/22 0851   · glucose chewable tablet 16 g 4 Tablet Oral PRN Iva Soria MD    · glucagon (GLUCAGEN) injection 1 mg 1 mg IntraMUSCular PRN Iva Soria MD    · dextrose (D50W) injection syrg 12.5-25 g 25-50 mL IntraVENous PRN Iva Soria MD    · 0.9% sodium chloride infusion 75 mL/hr IntraVENous CONTINUOUS Donald Enriquez MD 75 mL/hr at 01/07/22 1737 75 mL/hr at 01/07/22 1737   · sodium chloride (NS) flush 5-40 mL 5-40 mL IntraVENous Q8H Iva Soria MD 10 mL at 01/08/22 0506   · sodium chloride (NS) flush 5-40 mL 5-40 mL IntraVENous PRN Soni Soria MD    · acetaminophen (TYLENOL) tablet 650 mg 650 mg Oral Q6H PRN Soni Soria  mg at 01/02/22 1827     Or   · acetaminophen (TYLENOL) suppository 650 mg 650 mg Rectal Q6H PRN Soni Soria MD    · polyethylene glycol (MIRALAX) packet 17 g 17 g Oral DAILY PRN Soni Soria MD    · ondansetron (ZOFRAN ODT) tablet 4 mg 4 mg Oral Q8H PRN Soni Soria MD      Or   · ondansetron (ZOFRAN) injection 4 mg 4 mg IntraVENous Q6H PRN Soni Soria MD 4 mg at 01/07/22 0931   · enoxaparin (LOVENOX) injection 30 mg 30 mg SubCUTAneous DAILY Soni Soria MD 30 mg at 01/08/22 1136   · gabapentin (NEURONTIN) capsule 300 mg 300 mg Oral BID Soni Soria  mg at 01/06/22 2358              Renal Failure, Acute - Care Day 9 (1/7/2022) by Avel Varghese RN       Review Status Review Entered   Completed 1/18/2022 08:44      Criteria Review      Care Day: 9 Care Date: 1/7/2022 Level of Care: Inpatient Floor    Guideline Day 4    Clinical Status    ( ) * Hemodynamic stability    1/18/2022 08:44:00 EST by Dajuan Griffin      hr 103-114    (X) * Mental status at baseline    ( ) * Tachypnea absent    1/18/2022 08:44:00 EST by Dajuan Griffin      rr 20-22    (X) * Hypoxemia absent    1/18/2022 08:44:00 EST by Dajuan Griffin      on room air    ( ) * Etiology requiring inpatient treatment absent    ( ) * Electrolyte abnormalities absent or acceptable for next level of care    1/18/2022 08:44:00 EST by Dajuan Griffin      +hypocalcemia    (X) * Acid-base abnormalities absent    (X) * Volume status at baseline or acceptable for next level of care    ( ) * Diet tolerated    1/18/2022 08:44:00 EST by Dajuan Griffin      Still nausea, but no vomiting. Continue IV antiemetics.    IV metoclopramide HCl (REGLAN) injection 2.5 mg   3 TIMES DAILY BEFORE MEALS x3    (X) * Dialysis not needed or access and plan established ( ) * Discharge plans and education understood    Activity    ( ) * Ambulatory or acceptable for next level of care    Routes    ( ) * Oral hydration    1/18/2022 08:44:00 EST by Tay Mayorga      Continue IVF: NS @ 75ml/hr    (X) * Oral medications or regimen acceptable for next level of care    1/18/2022 08:44:00 EST by Tay Mayorga      IV medications: IV ciprofloxacin (CIPRO) 200 mg in D5W IVPB (premix) EVERY 12 HOURS/ IV Flagyl 500mg every 8 hours/ IV Morphine 2mg x 1    ( ) * Oral diet or acceptable for next level of care    Interventions    ( ) Possibly establish long-term access for dialysis    1/18/2022 08:44:00 EST by Tay Mayorga      Nephrology plan of care: Good urine out put, will monitor    (X) Monitor electrolytes, renal function tests, acid-base, and volume status    1/18/2022 08:44:00 EST by Tay Mayorga      MILADIS -still elevated. 1/7/2022 05:36  Glucose: 170 (H)  BUN: 33 (H)  Creatinine: 3.17 (H)  calcium: 8.3 (L)    Medications    (X) Possible medical therapies    1/18/2022 08:44:00 EST by Tay Mayorga      HTN- uncontrolled - Change to iv hydralazine. IV hydrALAZINE (APRESOLINE) 20 mg/mL injection 10 mg: EVERY 6 HOURS x 4 doses. +Chest pain, V/q scan no PE.    * Milestone   Additional Notes   1/7         -NEPHROLOGY:      Assessment:       Erika Payton is a 42y.o. year old male with h/o  DM, HTN,Neuropathy admitted for colitis, reportedly had more than 10 episodes of watery diarrhea on 12/27 and h/o loose stool/diarrhoea for 1 yr and uncontrolled DM.        Baseline serum creatinine on 119/21 was 0.94 mg/dl on admission it was 3.47 mg/dl and is 3.28 mg/dl on 11/22    Ct abdomen and pelvis does not have evidence of hydronephrosis and obstruction . He has decent urine output overnight . Hemodynamically stable without evidence of hypotension.     No evidence of proteinuria and hematuria on urine analysis . Does not have clinically significant proteinuria so less likely glomerular disease    So based on presentation Acute renal failure most likely is  pre renal volume depletion due to watery diarrhoea vs toxin related ATN due to infection/colitis            SUBJECTIVE:    -saw him after half part of  stress test , he says he still has abdominal discomfort and chest pain . Diarrhea improved . Denied problems in urinating    No nausea and vomiting               PLAN:-    Continue current supportive care   Good urine out put will monitor       Rest of the management per primary team        Subjective:   PT feels better, less pain           Review of Systems   Negative for  SOB               Blood pressure (!) 157/93, pulse (!) 109, temperature 99.2 °F (37.3 °C), resp. rate 17, height 5' 6\" (1.676 m), weight 97.1 kg (214 lb), SpO2 95 %. -INTERNAL MEDICINE:      Intractable nausea/ vomiting-   Still nausea, but no vomiting    Suspect gastroparesis as etiology- gastric emptying test is not available in-pt setting    Change back  reglan change to pemeal    Recommend f/u with gi as out-pt, no gi service till after Jan 8th    covid 19 rapid 2 times negative    Continue symptom tx             Abdominal pain. -no pain now            Colitis    No diarrhea    Continue tx         MILADIS -still elevated,but Stable , Bicarb added per renal    Renal us medical renal disease        DM2   Continue ssi ,low dose lantus         HTN- uncontrolled   Change to iv hydralazine        Grade II diastolic dysfunction- compensated        Chest pain-no pain today     Trop wnl     Stress test negative for ischemia    V/q scan no PE        Subjective: still nausea, but can keep food down            Disposition :tbd,    Review of systems:       General: No fevers or chills. Cardiovascular: No chest pain or pressure. No palpitations. Pulmonary: No shortness of breath. Gastrointestinal: +nausea, no vomiting.           Current Shift:  No intake/output data recorded.    Last three shifts:  01/05 1901 - 01/07 0700   In: 2387 [P.O.:940; I.V.:1447]   Out: 3700 [Urine:3700]       Physical Exam:   General:         WD, WN.  Alert, cooperative, no acute distress     HEENT:           NC, Atraumatic.  PERRLA, anicteric sclerae. Lungs:            CTA Bilaterally. No Wheezing/Rhonchi/Rales. Heart:              Regular  rhythm,  No murmur, No Rubs, No Gallops   Abdomen:      Soft, Non distended, Non tender.  +Bowel sounds,    Extremities:   No c/c/e   Psych:              Not anxious or agitated.    Neurologic:     No acute neurological deficit.               LABS:      1/7/2022 05:36   Sodium: 141   Potassium: 3.9   Chloride: 110   CO2: 20 (L)   Anion gap: 11   Glucose: 170 (H)   BUN: 33 (H)   Creatinine: 3.17 (H)   BUN/Creatinine ratio: 10 (L)   Calcium: 8.3 (L)   Magnesium: 2.0   GFR est non-AA: 21 (L)   GFR est AA: 26 (L)      1/7/2022 07:29   GLUCOSE,FAST - POC: 162 (H)      1/7/2022 11:18   GLUCOSE,FAST - POC: 173 (H)      1/7/2022 15:48   GLUCOSE,FAST - POC: 112 (H)      1/7/2022 17:20   GLUCOSE,FAST - POC: 130 (H)      1/7/2022 21:02   GLUCOSE,FAST - POC: 148 (H)           Renal Failure, Acute - Care Day 8 (1/6/2022) by Lisa Grewal RN       Review Status Review Entered   Completed 1/18/2022 08:33      Criteria Review      Care Day: 8 Care Date: 1/6/2022 Level of Care: Inpatient Floor    Guideline Day 4    Clinical Status    ( ) * Hemodynamic stability    1/18/2022 08:33:16 EST by Melba Pack      -107    (X) * Mental status at baseline    ( ) * Tachypnea absent    1/18/2022 08:33:16 EST by Melba Pack      RR 20-22    (X) * Hypoxemia absent    1/18/2022 08:33:16 EST by Melba Pack      on room air    ( ) * Etiology requiring inpatient treatment absent    ( ) * Electrolyte abnormalities absent or acceptable for next level of care    1/18/2022 08:33:16 EST by Melba Graves      hypocalcemia    ( ) * Acid-base abnormalities absent    1/18/2022 08:33:16 EST by Armin Gonzalez      Bicarb added per renal: oral sodium bicarbonate tablet 1,300 mg  Dose: 1,300 mg,  2 TIMES DAILY    (X) * Volume status at baseline or acceptable for next level of care    ( ) * Diet tolerated    1/18/2022 08:33:16 EST by Armin Gonzalez      Still nausea, but can keep food down -continue symptom tx. IV antiemetics: IV metoclopramide HCl (REGLAN) injection 2.5 mg   3 TIMES DAILY BEFORE MEALS x 3/ IV Zofran 4mg x 2    (X) * Dialysis not needed or access and plan established    ( ) * Discharge plans and education understood    Activity    ( ) * Ambulatory or acceptable for next level of care    Routes    ( ) * Oral hydration    (X) * Oral medications or regimen acceptable for next level of care    1/18/2022 08:33:16 EST by Armin Gonzalez      IV medications: IV ciprofloxacin (CIPRO) 200 mg in D5W IVPB (premix) EVERY 12 HOURS/ IV Flagyl 500mg every 8 hours/ IV hydrALAZINE (APRESOLINE) 20 mg/mL injection 10 mg EVERY 6 HOURS x2/ IV Morphine 2mg x 2    ( ) * Oral diet or acceptable for next level of care    Interventions    (X) Monitor electrolytes, renal function tests, acid-base, and volume status    1/18/2022 08:33:16 EST by Armin Gonzalez      1/6/2022 02:15  Glucose: 238 (H)  BUN: 39 (H)  Creatinine: 3.29 (H)  Calcium: 8.3 (L)    Medications    (X) Possible medical therapies    1/18/2022 08:33:16 EST by Armin Gonzalez      Glucose was over 200 AM, add low dose lantus am,   Continue ssi ,low dose lantus. Order trop x 1. Will give one time gi cocktail and do v/q scan.     * Milestone   Additional Notes   1/6         -EKG: Normal sinus rhythm    Normal ECG             -INTERNAL MEDICINE:      Intractable nausea/ vomiting-   Still nausea, but can keep food down -continue symptom tx    Suspect gastroparesis as etiology- gastric emptying test is not available in-pt setting    Change back  reglan change to pemeal    Recommend f/u with gi as out-pt, no gi service till after Jan 8th    covid 19 rapid 2 times negative    Continue symptom tx             Abdominal pain. -no pain now    Like due to colitis     No pain now            Colitis    No diarrhea    Continue tx         MILADIS due to dehydration sill not improving    Stable , Bicarb added per renal    Renal us medical renal disease        DM2   Glucose was over 200 AM, add low dose lantus am, not giving, discussed with RN    Continue ssi ,low dose lantus         HTN- uncontrolled   On norvasc , on hydralazine to 75 mg        Grade II diastolic dysfunction- compensated        Chest pain-no pain today     Stat ekg done,    Trop wnl     Stress test negative for ischemia        Subjective: still nausea, but can keep food down        Addendum :back to pt bedside due to rrt called. Same pain like before, reproducible. Morphine one time given, give gi cocktail ,stress test negative yesterday. ekg repeated, no change. One time trop Will give one time gi cocktail and do v/q scan.            Disposition :tbd,    Review of systems:       General: No fevers or chills. Cardiovascular: No chest pain or pressure. No palpitations. Pulmonary: No shortness of breath. Gastrointestinal: +nausea, no vomiting.           Visit Vitals   BP (!) 161/99   Pulse 95   Temp 99.2 °F (37.3 °C)   Resp 16   SpO2 98%  RA          Current Shift:  No intake/output data recorded. Last three shifts:  01/04 1901 - 01/06 0700   In: 0924 [P.O.:1260; I.V.:2080]   Out: 1650 [Urine:1600]       Physical Exam:   General:         WD, WN.  Alert, cooperative, no acute distress     HEENT:           NC, Atraumatic.  PERRLA, anicteric sclerae. Lungs:            CTA Bilaterally. No Wheezing/Rhonchi/Rales. Heart:              Regular  rhythm,  No murmur, No Rubs, No Gallops   Abdomen:      Soft, Non distended, Non tender.  +Bowel sounds,    Extremities:   No c/c/e   Psych:              Not anxious or agitated.    Neurologic:     No acute neurological deficit.   -NEPHROLOGY:   Assessment: Francy Edwards is a 42y.o. year old male with h/o  DM, HTN,Neuropathy admitted for colitis, reportedly had more than 10 episodes of watery diarrhea on 12/27 and h/o loose stool/diarrhoea for 1 yr and uncontrolled DM.        Baseline serum creatinine on 119/21 was 0.94 mg/dl on admission it was 3.47 mg/dl and is 3.28 mg/dl on 11/22    Ct abdomen and pelvis does not have evidence of hydronephrosis and obstruction . He has decent urine output overnight . Hemodynamically stable without evidence of hypotension. No evidence of proteinuria and hematuria on urine analysis . Does not have clinically significant proteinuria so less likely glomerular disease    So based on presentation Acute renal failure most likely is  pre renal volume depletion due to watery diarrhoea vs toxin related ATN due to infection/colitis            SUBJECTIVE:    -he says he has nausea , epigastric discomfort and chest pain    Denied problems in urinating    No nausea and vomiting               PLAN:-    - Since admission he looks sick, mostly complaints of abdominal pain and chest pain and poor appetite, has flat affect and does not communicate much . I doubt those manifestation is due to renal failure alone   Consider GI consult as well along with cardiology .  Does not have hematuria and proteinuria , less likely glomerular process, still believe it is ATN due to severe diarrhea for long time before presentation, but other causes are not excluded , need to follow renal trajectory for lit bit before further work up      - Decent urine output overnight, creatinine is slightly up this morning     - Urine electrolytes now is not consistent with pre re-renal. Considering poor intake would continue normal saline 75  cc/hour until tomorrow morning , close attention to BP    - Ensure that he does not retain urine   -- Intake and output   - Hold ACEI/ARBS and lasix   - Continue amlodipine for hypertension , continue hydralazine to 75 mg TID for hypertension    - Avoid NSAIDS,.contrats and nephrotoxin   - Dose all meds including gabapentin  and antibiotics for current eGFR   - No clinical and metabolic indication of RRT   - He has risk factors for CKD, optimum diabetic and BP management    - continue sodium bicarbonate 1300 mg BID for metabolic acidosis, most likely bicarb loss in GI.  Stop once serum bicarb above 22 mmol/lt             LABS:      1/6/2022 02:15   Sodium: 138   Potassium: 4.0   Chloride: 109   CO2: 21   Anion gap: 8   Glucose: 238 (H)   BUN: 39 (H)   Creatinine: 3.29 (H)   BUN/Creatinine ratio: 12   Calcium: 8.3 (L)   Magnesium: 2.1   GFR est non-AA: 21 (L)   GFR est AA: 25 (L)      1/6/2022 10:33   GLUCOSE,FAST - POC: 180 (H)      1/6/2022 10:45   Troponin-High Sensitivity: 32      1/6/2022 17:16   GLUCOSE,FAST - POC: 151 (H)      1/6/2022 21:04   GLUCOSE,FAST - POC: 139 (H)           Renal Failure, Acute - Care Day 7 (1/5/2022) by Hansel Gallegos RN       Review Status Review Entered   Completed 1/18/2022 08:22      Criteria Review      Care Day: 7 Care Date: 1/5/2022 Level of Care: Inpatient Floor    Guideline Day 4    Clinical Status    ( ) * Hemodynamic stability    1/18/2022 08:22:21 EST by Sarah Birch      -111    (X) * Mental status at baseline    (X) * Tachypnea absent    (X) * Hypoxemia absent    1/18/2022 08:22:21 EST by Sarah Birch      on room air    ( ) * Etiology requiring inpatient treatment absent    1/18/2022 08:22:21 EST by Sarah Birch      IMLADIS due to dehydration sill not improving    (X) * Electrolyte abnormalities absent or acceptable for next level of care    ( ) * Acid-base abnormalities absent    1/18/2022 08:22:21 EST by Sarah Birch      Nephrology plan of care: will start sodium bicarbonate 1300 mg BID for metabolic acidosis    (X) * Volume status at baseline or acceptable for next level of care    ( ) * Diet tolerated    1/18/2022 08:22:21 EST by Miranda Davison      +nausea/vomiting    (X) * Dialysis not needed or access and plan established    ( ) * Discharge plans and education understood    Activity    ( ) * Ambulatory or acceptable for next level of care    1/18/2022 08:22:21 EST by Miranda Davison      Patient complaining of abdominal pain and chest pain this morning    Routes    ( ) * Oral hydration    1/18/2022 08:22:21 EST by Miranda Davison      Continue IVF: NS @ 75ml/hr    (X) * Oral medications or regimen acceptable for next level of care    1/18/2022 08:22:21 EST by Miranda Davison      IV antiemetics: metoclopramide HCl (REGLAN) injection 2.5 mg x 1 dose/ IV metoclopramide HCl (REGLAN) injection 5 mg x 2 doses/ IV Zofran 4mg x 1    Pain control: IV morphine injection 2 mg   EVERY 6 HOURS AS NEEDED x 1 dose    ( ) * Oral diet or acceptable for next level of care    1/18/2022 08:22:21 EST by Miranda Davison      Nausea/vomiting improving today.  will advance diet    Interventions    ( ) Possible dialysis or renal replacement therapy    1/18/2022 08:22:21 EST by Miranda Davison      Nephrology plan of care: need to follow renal trajectory for lit bit before further work up    (X) Monitor electrolytes, renal function tests, acid-base, and volume status    1/18/2022 08:22:21 EST by Miranda Davison      1/5/2022 02:59  BUN: 40 (H)  Creatinine: 3.16 (H)    Medications    (X) Possible medical therapies    1/18/2022 08:22:21 EST by Miranda Davison      Chest pain   Stat ekg done,    Will have stress test due to risk factors     1/04/22:  Troponin-High Sensitivity: 19  Troponin-High Sensitivity: 23  Troponin-High Sensitivity: 25    * Milestone   Additional Notes   1/5         Labs:      1/5/2022 02:58   Magnesium: 2.1      1/5/2022 02:59   Sodium: 139   Potassium: 4.0   Chloride: 110   CO2: 20 (L)   Anion gap: 9   Glucose: 114 (H)   BUN: 40 (H)   Creatinine: 3.16 (H)   BUN/Creatinine ratio: 13   Calcium: 8.5   GFR est non-AA: 22 (L)   GFR est AA: 26 (L)      1/5/2022 07:34   GLUCOSE,FAST - POC: 116 (H)      1/5/2022 11:39   GLUCOSE,FAST - POC: 129 (H)      1/5/2022 16:06   GLUCOSE,FAST - POC: 138 (H)      1/5/2022 21:16   GLUCOSE,FAST - POC: 226 (H)               -INTERNAL MEDICINE:      Intractable nausea/ vomiting-   No n/v today , kub is good ,reported hungry will advance the diet    Change back to liquid diet    Suspect gastroparesis as etiology- gastric emptying test is not available in-pt setting    Change back  reglan change to pemeal    Recommend f/u with gi as out-pt, no gi service till after Jan 8th    covid 19 rapid 2 times negative             Abdominal pain. Like due to colitis     No pain now            Colitis    No diarrhea    Continue tx    No diarrhea         MILADIS due to dehydration sill not improving    Stable , Bicarb added per renal    Renal us medical renal disease        DM2   Not received lantus -not eating since admission,    Reported eat a lot at home-I believe he still needs insulin after dc at home    D.c lantus, add back as needed , advance diet             HTN- uncontrolled   On norvasc , on hydralazine to 75 mg        Grade II diastolic dysfunction- compensated        Chest pain-no pain today     Stat ekg done,    Trop wnl     Stress test still pending        Subjective: I want to eat more, feel hungry . I was told I do not need insulin -just watch what I eat . My a1c was 10 , now 9.8. I used 10 unit at home        rn : can he has work note?            Wife is at the bedside    All questions have been answered. 35 total min's spent on patient care including >50% on counseling/coordinating care. Discussed the above assessments. also discussed labs, medications and hospital course           Disposition :tbd,    Review of systems:       General: No fevers or chills. Cardiovascular: No chest pain or pressure. No palpitations. Pulmonary: No shortness of breath.     Gastrointestinal: No nausea, no vomiting.           Visit Vitals   BP (!) 160/90   Pulse (!) 104   Temp 98.3 °F (36.8 °C)   Resp 17   SpO2 99%  ra          Current Shift:  No intake/output data recorded. Last three shifts:  01/03 1901 - 01/05 0700   In: 2423 [P.O.:440; I.V.:1981]   Out: 4225 [Urine:4075]       Physical Exam:   General:         WD, WN.  Alert, cooperative, no acute distress     HEENT:           NC, Atraumatic.  PERRLA, anicteric sclerae. Lungs:            CTA Bilaterally. No Wheezing/Rhonchi/Rales. Heart:              Regular  rhythm,  No murmur, No Rubs, No Gallops   Abdomen:      Soft, Non distended, Non tender.  +Bowel sounds,    Extremities:   No c/c/e   Psych:              Not anxious or agitated. Neurologic:     No acute neurological deficit.                  -nephrology:          Assessment: Albin Gaines is a 42y.o. year old male with h/o  DM, HTN,Neuropathy admitted for colitis, reportedly had more than 10 episodes of watery diarrhea on 12/27 and h/o loose stool/diarrhoea for 1 yr and uncontrolled DM.        Baseline serum creatinine on 119/21 was 0.94 mg/dl on admission it was 3.47 mg/dl and is 3.28 mg/dl on 11/22    Ct abdomen and pelvis does not have evidence of hydronephrosis and obstruction . He has decent urine output overnight . Hemodynamically stable without evidence of hypotension. No evidence of proteinuria and hematuria on urine analysis . Does not have clinically significant proteinuria so less likely glomerular disease    So based on presentation Acute renal failure most likely is  pre renal volume depletion due to watery diarrhoea vs toxin related ATN due to infection/colitis            SUBJECTIVE:    -saw him after half part of  stress test , he says he still has abdominal discomfort and chest pain . Diarrhea improved .    Denied problems in urinating    No nausea and vomiting               PLAN:-    - Since admission he looks sick, mostly complaints of abdominal pain and chest pain and poor appetite, has flat affect and does not communicate much . I doubt those manifestation id due to renal failure alone   Consider GI consult as well along with cardiology . Does not have hematuria and proteinuria , less likely glomerular process, still believe it is ATN due to severe diarrhea for long time before presentation, but other causes are not excluded , need to follow renal trajectory for lit bit before further work up      - Decent urine output overnight,Renal function is not available this morning . Creatinine was down trending up until 1/4/22, will repeat BMP today    - Urine electrolytes now is not consistent with pre re-renal. Considering poor intake would continue normal saline 75  cc/hour until tomorrow morning , close attention to BP    - Ensure that he does not retain urine   -- Intake and output   - Hold ACEI/ARBS and lasix   - Continue amlodipine for hypertension , continue hydralazine to 75 mg TID for hypertension    - Avoid NSAIDS,.contrats and nephrotoxin   - Dose all meds and antibiotics for current eGFR   - No clinical and metabolic indication of RRT   - He has risk factors for CKD, optimum diabetic and BP management    - continue sodium bicarbonate 1300 mg BID for metabolic acidosis, most likely bicarb loss in GI. Stop once serum bicarb above 22 mmol/lt        Rest of the management per primary team            Intake/Output Summary (Last 24 hours) at 1/5/2022 0959   Last data filed at 1/5/2022 0700   Gross per 24 hour   Intake 1250 ml   Output 2825 ml   Net -1575 ml              Clinical 1/3 by Lizy Jarrett RN       Review Status Review Entered   In Primary 1/18/2022 08:06      Criteria Review   -INTERNAL MEDICINE PROGRESS NOTE:         Intractable nausea/ vomiting  Resolved, no n/v   Will advance the diet   Suspect gastroparesis as etiology- gastric emptying test is not available in-pt setting   Change reglan to prn       Abdominal pain.  No pain now   Like due to colitis          Colitis   No diarrhea   Continue tx       MILADIS due to dehydration  slowly improving, nephrology following  Avoid nsaids and iv contrast      DM2- controlled per lantus and ssi       HTN- uncontrolled  On norvasc ,increase hydralazine to 75 mg      Grade II diastolic dysfunction- compensated      Subjective: no n/v, no abdomen pain   rn : emptying test can not be done      Disposition :tbd,   Review of systems:     General: No fevers or chills. Cardiovascular: No chest pain or pressure. No palpitations. Pulmonary: No shortness of breath. Gastrointestinal: No nausea, vomiting.      Vital signs/Intake and Output:  Visit Vitals  BP (!) 165/99   Pulse 95   Temp 98.3 °F (36.8 °C)   Resp 18   Ht 5' 6\" (1.676 m)   Wt 90.7 kg (200 lb)   SpO2 94%   BMI 32.28 kg/m²      Current Shift:  No intake/output data recorded. Last three shifts:  01/01 1901 - 01/03 0700  In: 600 [P.O.:600]  Out: 3650 [Urine:3650]     Physical Exam:  General:         WD, WN.  Alert, cooperative, no acute distress    HEENT:           NC, Atraumatic.  PERRLA, anicteric sclerae. Lungs:            CTA Bilaterally. No Wheezing/Rhonchi/Rales. Heart:              Regular  rhythm,  No murmur, No Rubs, No Gallops  Abdomen:      Soft, Non distended, Non tender.  +Bowel sounds,   Extremities:   No c/c/e  Psych:              Not anxious or agitated. Neurologic:     No acute neurological deficit.              ECHO ADULT COMPLETE     Result Date: 1/1/2022    Left Ventricle: Left ventricle size is normal. Mildly increased wall thickness. Normal wall motion. Normal left ventricular systolic function with a visually estimated EF of 60 - 65%. Grade II diastolic dysfunction with increased LAP.   Mitral Valve: Mildly thickened leaflets. Mild transvalvular regurgitation. No stenosis.   Tricuspid Valve: Tricuspid regurgitation is inadequate for estimation of right ventricular systolic pressure.    IVC/SVC: IVC diameter is less than or equal to 21 mm and decreases greater than 50% during inspiration; therefore the estimated right atrial pressure is normal (~3 mmHg).             Labs:       Ref. Range 1/3/2022 02:25 1/3/2022 09:10 1/3/2022 11:54 1/3/2022 16:39 1/3/2022 22:35   GLUCOSE,FAST - POC Latest Ref Range: 70 - 110 mg/dL   136 (H) 150 (H) 130 (H) 109   Sodium Latest Ref Range: 136 - 145 mmol/L 139           Potassium Latest Ref Range: 3.5 - 5.5 mmol/L 4.3           Chloride Latest Ref Range: 100 - 111 mmol/L 109           CO2 Latest Ref Range: 21 - 32 mmol/L 20 (L)           Anion gap Latest Ref Range: 3.0 - 18 mmol/L 10           Glucose Latest Ref Range: 74 - 99 mg/dL 128 (H)           BUN Latest Ref Range: 7.0 - 18 MG/DL 37 (H)           Creatinine Latest Ref Range: 0.6 - 1.3 MG/DL 3.21 (H)           BUN/Creatinine ratio Latest Ref Range: 12 - 20   12           Calcium Latest Ref Range: 8.5 - 10.1 MG/DL 8.4 (L)           Magnesium Latest Ref Range: 1.6 - 2.6 mg/dL 2.2           GFR est non-AA Latest Ref Range: >60 ml/min/1.73m2 21 (L)           GFR est AA Latest Ref Range: >60 ml/min/1.73m2 26 (L)                       -NEPHROLOGY:     Assessment: Ben Lynch is a 42y.o. year old male  38 y. o.  male who has hx of DM, HTN,Neuropathy admitted for colitis, reportedly had more than 10 episodes of watery diarrhea on 12/27 and h/o loose stool/diarrhoea for 1 yr and uncontrolled DM.      Baseline serum creatinine on 119/21 was 0.94 mg/dl on admission it was 3.47 mg/dl and is 3.28 mg/dl on 11/22   Ct abdomen and pelvis does not have evidence of hydronephrosis and obstruction . He has decent urine output overnight . Hemodynamically stable without evidence of hypotension. No evidence of proteinuria and hematuria on urine analysis .    So based on presentation Acute renal failure most likely is  pre renal volume depletion due to watery diarrhoea vs toxin related ATN due to infection/colitis         SUBJECTIVE:   -He says he feels little better than yesterday , complaint of constipation and abdominal discomfort   Denied problems in urinating   No nausea and vomiting           PLAN:-   - Decent urine output overnight with slight rise in serum creatinine   - Urine electrolytes now is not consistent with pre re-renal. Considering poor intake would continue normal saline 75  cc/hour until tomorrow morning , close attention to BP   - Ensure that he does not retain urine  -- Intake and output  - Hold ACEI/ARBS and lasix  - Continue amlodipine for hypertension , agree to increase hydralazine to 75 mg TID for hypertension   - Avoid NSAIDS,.contrats and nephrotoxin  - Dose all meds and antibiotics for current eGFR  - No clinical and metabolic indication of RRT  - He has risk factors for CKD, optimum diabetic and BP management            1/2 remote tele by Jordyn Klein RN       Review Status Review Entered   In Primary 1/13/2022 07:54      Criteria Review   98.9 106 18 178/101 97% ra   gluc 126 151 154 142 143   bun 33 cr 3.15   ivf @ 100   iv reglan 10 mg q6h changed to 5 mg iv q6h  iv cipro 400mg q12h dcd changed to po cipro 250 mg q12h   iv flagyl 250mg q12h changed to po flagyl 500mg 3x daily   iv morphine 2 mg q4h prn severe pain given x 1 and changed to iv morphine 2 mg q6h prn severe pain given x1   po apresoline 50 mg 3x daily   lantus sc 20 units bedtime   lispro sc 2 units given x1   iv protonix 40mg q12h      per attending   Intractable nausea/ vomiting- continues to improve.  Tolerating full liquid diet  Suspect gastroparesis as etiology  MILADIS due to dehydration, slowly improving, nephrology following  - change IV abx to po  - decr reglan  - decr frequency of morphine administration  - increase hydralazine dosing  - OOB  -gastric emptying study pending     per nephrology   He says he feels little better than yesterday   Decent urine output overnight with slight drop in serum creatinine   - Urine electrolytes now is not consistent with pre re-renal. Considering poor intake would continue normal saline 100 cc/hpur until tomorrow morning , close attention to BP   - Ensure that he does not retain urine  -- Intake and output  - Hold ACEI/ARBS and lasix  - Continue amlodipine for hypertension , add hydralazine if remained elevated   - Avoid NSAIDS,.contrats and nephrotoxin  - Dose all meds and antibiotics for current eGFR  - No clinical and metabolic indication of RRT  - He has risk factors for CKD, optimum diabetic and BP management            Renal Failure, Acute - Care Day 6 (1/4/2022) by Sandra Tracy RN       Review Status Review Entered   Completed 1/12/2022 12:57      Criteria Review      Care Day: 6 Care Date: 1/4/2022 Level of Care: Inpatient Floor    Guideline Day 4    Clinical Status    ( ) * Hemodynamic stability    1/12/2022 12:57:35 EST by Westley Valente      -107    (X) * Mental status at baseline    (X) * Tachypnea absent    (X) * Hypoxemia absent    1/12/2022 12:57:35 EST by Westley Valente      ON ROOM AIR    ( ) * Etiology requiring inpatient treatment absent    1/12/2022 12:57:35 EST by Westley Valente      patient complaining of abdominal pain and chest pain. +Intractable nausea/ vomiting-  Nausea/ vomiting came back today . Change back to liquid diet .     ( ) * Electrolyte abnormalities absent or acceptable for next level of care    1/12/2022 12:57:35 EST by Westley Valente      calcium: 8.2    ( ) * Acid-base abnormalities absent    1/12/2022 12:57:35 EST by Westley Valente      Nephrology plan of care: will start sodium bicarbonate 1300 mg BID for metabolic acidosis    ( ) * Volume status at baseline or acceptable for next level of care    ( ) * Diet tolerated    1/12/2022 12:57:35 EST by Westley Valente      +nausea/vomiting    (X) * Dialysis not needed or access and plan established    ( ) * Discharge plans and education understood    Activity    ( ) * Ambulatory or acceptable for next level of care    Routes    ( ) * Oral hydration    (X) * Oral medications or regimen acceptable for next level of care    ( ) * Oral diet or acceptable for next level of care    1/12/2022 12:57:35 EST by Bob Moore      change back to clear liquid diet today due to symptoms of colitis    Interventions    (X) Monitor electrolytes, renal function tests, acid-base, and volume status    1/12/2022 12:57:35 EST by Bob Moore      MILADIS due to dehydration sill not improving ; will order renal ultrasound. 1/4/2022 02:40  BUN: 39 (H)  Creatinine: 3.13 (H)    Medications    (X) Possible medical therapies    1/12/2022 12:57:35 EST by Bob Moore      Chest pain   Stat ekg done- Normal sinus rhythm . Trop wnl    Will have stress test due to risk factors    * Milestone   Additional Notes   1/4         LABS:      1/4/2022 02:40   Sodium: 137   Potassium: 4.0   Chloride: 107   CO2: 17 (L)   Anion gap: 13   Glucose: 114 (H)   BUN: 39 (H)   Creatinine: 3.13 (H)   BUN/Creatinine ratio: 12   Calcium: 8.2 (L)   Magnesium: 2.0   GFR est non-AA: 22 (L)   GFR est AA: 26 (L)      1/4/2022 09:50   Troponin-High Sensitivity: 19      1/4/2022 11:58   GLUCOSE,FAST - POC: 119 (H)      1/4/2022 14:35   COVID-19 RAPID TEST: Rpt      1/4/2022 16:11   GLUCOSE,FAST - POC: 126 (H)      1/4/2022 17:13   Troponin-High Sensitivity: 23      1/4/2022 21:21   GLUCOSE,FAST - POC: 120 (H)      1/4/2022 22:35   Troponin-High Sensitivity: 25               -INTERNAL MEDICINE :         Intractable nausea/ vomiting-   N/v came back today    Change back to liquid diet    Suspect gastroparesis as etiology- gastric emptying test is not available in-pt setting    Change back  reglan scheduled    kub         Abdominal pain.     Like due to colitis     Will have kub            Colitis    No diarrhea    Continue tx    No diarrhea         MILADIS due to dehydration sill not improving    slowly improving, nephrology following   Avoid nsaids and iv contrast    Bicarb added per renal Will have renal ultrasound        DM2- controlled per lantus and ssi         HTN- uncontrolled   On norvasc , on hydralazine to 75 mg        Grade II diastolic dysfunction- compensated        Chest pain    Stat ekg done,    Trop wnl     Will have stress test due to risk factors        Subjective: n/v back, the smell of food makes me n/v , pain for stomach to my chest        rn : emptying test can not be done        Will recheck covid 19, negative on dec 30                     Review of systems:       General: No fevers or chills. Cardiovascular: No chest pain or pressure. No palpitations. Pulmonary: No shortness of breath. Gastrointestinal: No nausea, vomiting.           Visit Vitals   BP (!) 161/89 (BP 1 Location: Right upper arm, BP Patient Position: At rest;Supine)   Pulse (!) 101   Temp 98.3 °F (36.8 °C)   Resp 17   SpO2 98%              Physical Exam:   General:         WD, WN.  Alert, cooperative, no acute distress     HEENT:           NC, Atraumatic.  PERRLA, anicteric sclerae. Lungs:            CTA Bilaterally. No Wheezing/Rhonchi/Rales. Heart:              Regular  rhythm,  No murmur, No Rubs, No Gallops   Abdomen:      Soft, Non distended, Non tender.  +Bowel sounds,    Extremities:   No c/c/e   Psych:              Not anxious or agitated. Neurologic:     No acute neurological deficit.                   -NEPHROLOGY:      Assessment: Riya Soto is a 42y.o. year old male with h/o  DM, HTN,Neuropathy admitted for colitis, reportedly had more than 10 episodes of watery diarrhea on 12/27 and h/o loose stool/diarrhoea for 1 yr and uncontrolled DM.        Baseline serum creatinine on 119/21 was 0.94 mg/dl on admission it was 3.47 mg/dl and is 3.28 mg/dl on 11/22    Ct abdomen and pelvis does not have evidence of hydronephrosis and obstruction . He has decent urine output overnight . Hemodynamically stable without evidence of hypotension.     No evidence of proteinuria and hematuria on urine analysis . So based on presentation Acute renal failure most likely is  pre renal volume depletion due to watery diarrhoea vs toxin related ATN due to infection/colitis            SUBJECTIVE:    -says he does not have diarrhea anymore . This morning he was complaining of abdominal pain and chest pain    Denied problems in urinating    No nausea and vomiting               PLAN:-    - Decent urine output overnight, creatinine is still elevated but renal function stable as compared to yesterday . - Urine electrolytes now is not consistent with pre re-renal. Considering poor intake would continue normal saline 75  cc/hour until tomorrow morning , close attention to BP    - Ensure that he does not retain urine   -- Intake and output   - Hold ACEI/ARBS and lasix   - Continue amlodipine for hypertension , continue hydralazine to 75 mg TID for hypertension    - Avoid NSAIDS,.contrats and nephrotoxin   - Dose all meds and antibiotics for current eGFR   - No clinical and metabolic indication of RRT   - He has risk factors for CKD, optimum diabetic and BP management    - discussed with Dr Nivia Melgar , hospitalist about his abdominal/chest pain , work up per primary team    - will start sodium bicarbonate 1300 mg BID for metabolic acidosis              Intake/Output Summary (Last 24 hours) at 1/4/2022 1016   Last data filed at 1/4/2022 0630   Gross per 24 hour   Intake 2781.33 ml   Output 1400 ml   Net 1381.33 ml             -RENAL ULTRASOUND:    Findings:       The right kidney measures 13.8 cm in length and is normal in echotexture. No   focal lesions are seen. Increased parenchymal echogenicity.       The left kidney measures 12.8 cm in length and is normal in echotexture. No   focal lesions are seen. Increased parenchymal echogenicity.         The bladder is unremarkable.  The estimated volume of the bladder pre-void is   201 mL. The patient did not void for calculation of a post void residual.       IMPRESSION       1.  No evidence of obstructive uropathy. 2. Increased parenchymal echogenicity as can be seen with medical renal disease. -XRAY ABDOMEN (KUB): FINDINGS:       Bowel gas pattern both nonobstructive and nonspecific. No gross free   intraperitoneal perforation. No acute osseous abnormality.       _______________       IMPRESSION       Nonobstructive and nonspecific bowel gas pattern without post acute radiographic   abnormality.                 -MEDICATIONS:   01/03/22 2000    pantoprazole (PROTONIX) tablet 40 mg     Discontinue     -- Dispensed PO EVERY 12 HOURS 01/03/22 1831   01/03/22 1600    hydrALAZINE (APRESOLINE) tablet 75 mg     Discontinue     -- Dispensed PO 3 TIMES DAILY 01/03/22 1333   01/02/22 1100    morphine injection 2 mg     Discontinue     -- Dispensed IV EVERY 6 HOURS AS NEEDED 01/02/22 1007   01/01/22 1100    amLODIPine (NORVASC) tablet 10 mg     Discontinue     -- Dispensed PO DAILY 01/01/22 0918   12/31/21 0000    gabapentin (NEURONTIN) capsule 300 mg  Status:  Discontinued        01/12 1113 Discontinued PO 2 TIMES DAILY 12/30/21 2331   12/30/21 2200    insulin lispro (HUMALOG) injection  (CORRECTIONAL SCALE LISPRO PANEL)     Discontinue     -- Dispensed SC 4 TIMES DAILY BEFORE MEALS & NIGHTLY 12/30/21 1833   12/30/21 2200    sodium chloride (NS) flush 5-40 mL  (SALINE LOCK FLUSH PANEL)     Discontinue     -- Dispensed IV EVERY 8 HOURS 12/30/21 1836 12/30/21 1836    polyethylene glycol (MIRALAX) packet 17 g  (Bowel Management - 1st Line PRN)     Discontinue     -- Verified PO DAILY PRN 12/30/21 1836   12/30/21 1836    ondansetron (ZOFRAN ODT) tablet 4 mg  (ondansetron (ZOFRAN) ODT or ondansetron (ZOFRAN) IV)     Discontinue     \"Or\" Linked Group Details   -- Verified PO EVERY 8 HOURS AS NEEDED 12/30/21 1836   12/30/21 1836    ondansetron (ZOFRAN) injection 4 mg  (ondansetron (ZOFRAN) ODT or ondansetron (ZOFRAN) IV)     Discontinue     \"Or\" Linked Group Details   -- Dispensed IV EVERY 6 HOURS AS NEEDED 12/30/21 1836 12/30/21 1836    sodium chloride (NS) flush 5-40 mL  (SALINE LOCK FLUSH PANEL)     Discontinue     -- Dispensed IV AS NEEDED 12/30/21 1836 12/30/21 1836    acetaminophen (TYLENOL) tablet 650 mg  (acetaminophen PO or VT panel)     Discontinue     \"Or\" Linked Group Details   -- Dispensed PO EVERY 6 HOURS AS NEEDED 12/30/21 1836 12/30/21 1836    acetaminophen (TYLENOL) suppository 650 mg  (acetaminophen PO or VT panel)     Discontinue     \"Or\" Linked Group Details   -- Dispensed RE EVERY 6 HOURS AS NEEDED 12/30/21 1836 12/30/21 1833    glucose chewable tablet 16 g  (CORRECTIONAL SCALE LISPRO PANEL)     Discontinue     -- Verified PO AS NEEDED 12/30/21 1833 12/30/21 1833    glucagon (GLUCAGEN) injection 1 mg  (CORRECTIONAL SCALE LISPRO PANEL)     Discontinue     -- Verified IM AS NEEDED 12/30/21 1833 12/30/21 1833    dextrose (D50W) injection syrg 12.5-25 g  (CORRECTIONAL SCALE LISPRO PANEL)     Discontinue     -- Verified IV AS NEEDED 12/30/21 1833

## 2022-06-21 ENCOUNTER — APPOINTMENT (OUTPATIENT)
Dept: ULTRASOUND IMAGING | Age: 47
End: 2022-06-21
Attending: EMERGENCY MEDICINE
Payer: COMMERCIAL

## 2022-06-21 ENCOUNTER — APPOINTMENT (OUTPATIENT)
Dept: GENERAL RADIOLOGY | Age: 47
End: 2022-06-21
Attending: EMERGENCY MEDICINE
Payer: COMMERCIAL

## 2022-06-21 ENCOUNTER — HOSPITAL ENCOUNTER (EMERGENCY)
Age: 47
Discharge: HOME | End: 2022-06-21
Attending: EMERGENCY MEDICINE
Payer: COMMERCIAL

## 2022-06-21 VITALS
TEMPERATURE: 98.6 F | SYSTOLIC BLOOD PRESSURE: 154 MMHG | HEIGHT: 66 IN | WEIGHT: 198 LBS | DIASTOLIC BLOOD PRESSURE: 87 MMHG | HEART RATE: 93 BPM | OXYGEN SATURATION: 97 % | BODY MASS INDEX: 31.82 KG/M2 | RESPIRATION RATE: 17 BRPM

## 2022-06-21 DIAGNOSIS — R10.13 ABDOMINAL PAIN, EPIGASTRIC: Primary | ICD-10-CM

## 2022-06-21 DIAGNOSIS — N18.9 CHRONIC KIDNEY DISEASE, UNSPECIFIED CKD STAGE: ICD-10-CM

## 2022-06-21 LAB
ALBUMIN SERPL-MCNC: 3.9 G/DL (ref 3.4–5)
ALBUMIN/GLOB SERPL: 1 {RATIO} (ref 0.8–1.7)
ALP SERPL-CCNC: 128 U/L (ref 45–117)
ALT SERPL-CCNC: 26 U/L (ref 16–61)
ANION GAP SERPL CALC-SCNC: 8 MMOL/L (ref 3–18)
APPEARANCE UR: CLEAR
AST SERPL-CCNC: 11 U/L (ref 10–38)
ATRIAL RATE: 95 BPM
ATRIAL RATE: 99 BPM
BASOPHILS # BLD: 0.1 K/UL (ref 0–0.1)
BASOPHILS NFR BLD: 0 % (ref 0–2)
BILIRUB SERPL-MCNC: 0.5 MG/DL (ref 0.2–1)
BILIRUB UR QL: NEGATIVE
BUN SERPL-MCNC: 47 MG/DL (ref 7–18)
BUN/CREAT SERPL: 19 (ref 12–20)
CALCIUM SERPL-MCNC: 9.3 MG/DL (ref 8.5–10.1)
CALCULATED P AXIS, ECG09: 52 DEGREES
CALCULATED P AXIS, ECG09: 67 DEGREES
CALCULATED R AXIS, ECG10: 22 DEGREES
CALCULATED R AXIS, ECG10: 3 DEGREES
CALCULATED T AXIS, ECG11: 102 DEGREES
CALCULATED T AXIS, ECG11: 98 DEGREES
CHLORIDE SERPL-SCNC: 99 MMOL/L (ref 100–111)
CO2 SERPL-SCNC: 27 MMOL/L (ref 21–32)
COLOR UR: YELLOW
CREAT SERPL-MCNC: 2.47 MG/DL (ref 0.6–1.3)
DIAGNOSIS, 93000: NORMAL
DIAGNOSIS, 93000: NORMAL
DIFFERENTIAL METHOD BLD: ABNORMAL
EOSINOPHIL # BLD: 0.2 K/UL (ref 0–0.4)
EOSINOPHIL NFR BLD: 2 % (ref 0–5)
ERYTHROCYTE [DISTWIDTH] IN BLOOD BY AUTOMATED COUNT: 12.2 % (ref 11.6–14.5)
ETHANOL SERPL-MCNC: <3 MG/DL (ref 0–3)
GLOBULIN SER CALC-MCNC: 3.9 G/DL (ref 2–4)
GLUCOSE BLD STRIP.AUTO-MCNC: 296 MG/DL (ref 70–110)
GLUCOSE SERPL-MCNC: 280 MG/DL (ref 74–99)
GLUCOSE UR STRIP.AUTO-MCNC: 100 MG/DL
HCT VFR BLD AUTO: 38.7 % (ref 36–48)
HGB BLD-MCNC: 13 G/DL (ref 13–16)
HGB UR QL STRIP: NEGATIVE
IMM GRANULOCYTES # BLD AUTO: 0.1 K/UL (ref 0–0.04)
IMM GRANULOCYTES NFR BLD AUTO: 0 % (ref 0–0.5)
KETONES UR QL STRIP.AUTO: NEGATIVE MG/DL
LEUKOCYTE ESTERASE UR QL STRIP.AUTO: NEGATIVE
LIPASE SERPL-CCNC: 118 U/L (ref 73–393)
LYMPHOCYTES # BLD: 2.6 K/UL (ref 0.9–3.6)
LYMPHOCYTES NFR BLD: 21 % (ref 21–52)
MCH RBC QN AUTO: 26.6 PG (ref 24–34)
MCHC RBC AUTO-ENTMCNC: 33.6 G/DL (ref 31–37)
MCV RBC AUTO: 79.1 FL (ref 78–100)
MONOCYTES # BLD: 1 K/UL (ref 0.05–1.2)
MONOCYTES NFR BLD: 8 % (ref 3–10)
NEUTS SEG # BLD: 8.8 K/UL (ref 1.8–8)
NEUTS SEG NFR BLD: 70 % (ref 40–73)
NITRITE UR QL STRIP.AUTO: NEGATIVE
NRBC # BLD: 0 K/UL (ref 0–0.01)
NRBC BLD-RTO: 0 PER 100 WBC
P-R INTERVAL, ECG05: 122 MS
P-R INTERVAL, ECG05: 140 MS
PH UR STRIP: 5 [PH] (ref 5–8)
PLATELET # BLD AUTO: 324 K/UL (ref 135–420)
PMV BLD AUTO: 9.8 FL (ref 9.2–11.8)
POTASSIUM SERPL-SCNC: 3.4 MMOL/L (ref 3.5–5.5)
PROT SERPL-MCNC: 7.8 G/DL (ref 6.4–8.2)
PROT UR STRIP-MCNC: NEGATIVE MG/DL
Q-T INTERVAL, ECG07: 350 MS
Q-T INTERVAL, ECG07: 356 MS
QRS DURATION, ECG06: 78 MS
QRS DURATION, ECG06: 80 MS
QTC CALCULATION (BEZET), ECG08: 447 MS
QTC CALCULATION (BEZET), ECG08: 449 MS
RBC # BLD AUTO: 4.89 M/UL (ref 4.35–5.65)
SODIUM SERPL-SCNC: 134 MMOL/L (ref 136–145)
SP GR UR REFRACTOMETRY: 1.01 (ref 1–1.03)
TROPONIN-HIGH SENSITIVITY: 10 NG/L (ref 0–78)
TROPONIN-HIGH SENSITIVITY: 10 NG/L (ref 0–78)
UROBILINOGEN UR QL STRIP.AUTO: 0.2 EU/DL (ref 0.2–1)
VENTRICULAR RATE, ECG03: 95 BPM
VENTRICULAR RATE, ECG03: 99 BPM
WBC # BLD AUTO: 12.6 K/UL (ref 4.6–13.2)

## 2022-06-21 PROCEDURE — 71045 X-RAY EXAM CHEST 1 VIEW: CPT

## 2022-06-21 PROCEDURE — 99285 EMERGENCY DEPT VISIT HI MDM: CPT

## 2022-06-21 PROCEDURE — 85025 COMPLETE CBC W/AUTO DIFF WBC: CPT

## 2022-06-21 PROCEDURE — 80053 COMPREHEN METABOLIC PANEL: CPT

## 2022-06-21 PROCEDURE — 96375 TX/PRO/DX INJ NEW DRUG ADDON: CPT

## 2022-06-21 PROCEDURE — 93005 ELECTROCARDIOGRAM TRACING: CPT

## 2022-06-21 PROCEDURE — 76705 ECHO EXAM OF ABDOMEN: CPT

## 2022-06-21 PROCEDURE — 96374 THER/PROPH/DIAG INJ IV PUSH: CPT

## 2022-06-21 PROCEDURE — 83690 ASSAY OF LIPASE: CPT

## 2022-06-21 PROCEDURE — 82962 GLUCOSE BLOOD TEST: CPT

## 2022-06-21 PROCEDURE — 74011250636 HC RX REV CODE- 250/636: Performed by: EMERGENCY MEDICINE

## 2022-06-21 PROCEDURE — 82077 ASSAY SPEC XCP UR&BREATH IA: CPT

## 2022-06-21 PROCEDURE — 84484 ASSAY OF TROPONIN QUANT: CPT

## 2022-06-21 PROCEDURE — 81003 URINALYSIS AUTO W/O SCOPE: CPT

## 2022-06-21 RX ORDER — ONDANSETRON 2 MG/ML
4 INJECTION INTRAMUSCULAR; INTRAVENOUS
Status: COMPLETED | OUTPATIENT
Start: 2022-06-21 | End: 2022-06-21

## 2022-06-21 RX ORDER — ONDANSETRON 8 MG/1
8 TABLET, ORALLY DISINTEGRATING ORAL
Qty: 12 TABLET | Refills: 0 | Status: SHIPPED | OUTPATIENT
Start: 2022-06-21

## 2022-06-21 RX ORDER — SUCRALFATE 1 G/10ML
1 SUSPENSION ORAL
Qty: 414 ML | Refills: 0 | Status: SHIPPED | OUTPATIENT
Start: 2022-06-21

## 2022-06-21 RX ORDER — FAMOTIDINE 10 MG/ML
20 INJECTION INTRAVENOUS
Status: COMPLETED | OUTPATIENT
Start: 2022-06-21 | End: 2022-06-21

## 2022-06-21 RX ADMIN — SODIUM CHLORIDE 1000 ML: 9 INJECTION, SOLUTION INTRAVENOUS at 14:44

## 2022-06-21 RX ADMIN — ONDANSETRON 4 MG: 2 INJECTION INTRAMUSCULAR; INTRAVENOUS at 12:33

## 2022-06-21 RX ADMIN — FAMOTIDINE 20 MG: 10 INJECTION, SOLUTION INTRAVENOUS at 12:33

## 2022-06-21 RX ADMIN — SODIUM CHLORIDE 1000 ML: 9 INJECTION, SOLUTION INTRAVENOUS at 12:33

## 2022-06-21 NOTE — ED NOTES
Pt reports hx of reflux and states this feels similar. Pt endorses epigastric pain that is reproducible upon palpation. Pt also endorses he had episodes of N/V/D. Pt reports this is similar to previous episode of reflux. Pt has hoarse voice but is speaking in complete clear sentences.  Pt appears to be in NAD

## 2022-06-21 NOTE — ED PROVIDER NOTES
EMERGENCY DEPARTMENT HISTORY AND PHYSICAL EXAM    Date: 6/21/2022  Patient Name: Marlen Stock    History of Presenting Illness     Chief Complaint   Patient presents with    Epigastric Pain         History Provided By: Patient    12:11 PM  Marlen Stock is a 41 y.o. male with PMHX of diabetes, hypertension, GERD who presents to the emergency department C/O epigastric pain, nausea, vomiting. Patient reports symptoms started on Wednesday, 6 days ago. He states symptoms are constant. He rates the pain a 4 out of 10 and points to his epigastric area describes as dull. He reports is worse when he lays flat. No clear relieving factors identified. Notes some associated cough. Denies any fever, chest pain, shortness of breath, bowel or urinary complaints. No prior abdominal surgeries. Denies any alcohol or substance use. PCP: Domonique YATES NP    Current Outpatient Medications   Medication Sig Dispense Refill    ondansetron (ZOFRAN ODT) 8 mg disintegrating tablet Take 1 Tablet by mouth every eight (8) hours as needed for Nausea for up to 12 doses. 12 Tablet 0    sucralfate (CARAFATE) 100 mg/mL suspension Take 5 mL by mouth four (4) times daily as needed for GI Pain. 414 mL 0    amLODIPine (NORVASC) 10 mg tablet Take 1 Tablet by mouth daily. 30 Tablet 0    cetirizine (ZYRTEC) 5 mg tablet Take 1 Tablet by mouth daily. 10 Tablet 0    gabapentin (NEURONTIN) 300 mg capsule Take 1 Capsule by mouth daily. Max Daily Amount: 300 mg. 30 Capsule 0    insulin glargine (LANTUS) 100 unit/mL injection 10 Units by SubCUTAneous route daily. 1 mL 0    metoprolol tartrate (LOPRESSOR) 25 mg tablet Take 1 Tablet by mouth every twelve (12) hours. 60 Tablet 0    pantoprazole (Protonix) 40 mg tablet Take 1 Tablet by mouth daily. 30 Tablet 0    SITagliptin (Januvia) 50 mg tablet Take 1 Tablet by mouth daily.  30 Tablet 0       Past History       Past Medical History:  Past Medical History:   Diagnosis Date    [facility] ([facility]     Diabetes ([facility]     GERD (gastroesophageal reflux disease)     Hypertension        Past Surgical History:  History reviewed. No pertinent surgical history. Family History:  History reviewed. No pertinent family history. Social History:  Social History     Tobacco Use    Smoking status: Never Smoker    Smokeless tobacco: Never Used   Substance Use Topics    Alcohol use: Never    Drug use: Never       Allergies:  No Known Allergies      Review of Systems   Review of Systems   Constitutional: Negative for fever. Respiratory: Positive for cough. Cardiovascular: Negative for chest pain. Gastrointestinal: Positive for abdominal pain, nausea and vomiting. Negative for diarrhea. Genitourinary: Negative for difficulty urinating. All other systems reviewed and are negative.         Physical Exam     Vitals:    06/21/22 1205 06/21/22 1458   BP: 96/67 (!) 154/87   Pulse: (!) 107 93   Resp: 18 17   Temp: 98.6 °F (37 °C)    SpO2: 100% 97%   Weight: 89.8 kg (198 lb)    Height: 5' 6\" (1.676 m)      Physical Exam    Nursing notes and vital signs reviewed    Constitutional: Non toxic appearing, moderate distress  Head: Normocephalic, Atraumatic  Eyes: EOMI  Neck: Supple  Cardiovascular: Tachycardic and regular rhythm, no murmurs, rubs, or gallops  Chest: Normal work of breathing and chest excursion bilaterally  Lungs: Clear to ausculation bilaterally  Abdomen: Soft, gastric and right upper quadrant tenderness with guarding, otherwise nontender, non distended, normoactive bowel sounds  Back: No evidence of trauma or deformity  Extremities: No evidence of trauma or deformity, no LE edema  Skin: Warm and dry, normal cap refill  Neuro: Alert and appropriate  Psychiatric: Normal mood and affect      Diagnostic Study Results     Labs -     Recent Results (from the past 12 hour(s))   GLUCOSE, POC    Collection Time: 06/21/22 12:09 PM   Result Value Ref Range    Glucose (POC) 296 (H) 70 - 110 mg/dL   EKG, 12 LEAD, INITIAL    Collection Time: 06/21/22 12:11 PM   Result Value Ref Range    Ventricular Rate 99 BPM    Atrial Rate 99 BPM    P-R Interval 140 ms    QRS Duration 78 ms    Q-T Interval 350 ms    QTC Calculation (Bezet) 449 ms    Calculated P Axis 67 degrees    Calculated R Axis 22 degrees    Calculated T Axis 98 degrees    Diagnosis       Normal sinus rhythm  Nonspecific T wave abnormality  Abnormal ECG  When compared with ECG of 06-JAN-2022 10:35,  No significant change was found  Confirmed by Jam Brito MD, -- ([id number]) on 6/21/2022 3:43:22 PM     CBC WITH AUTOMATED DIFF    Collection Time: 06/21/22 12:24 PM   Result Value Ref Range    WBC 12.6 4.6 - 13.2 K/uL    RBC 4.89 4.35 - 5.65 M/uL    HGB 13.0 13.0 - 16.0 g/dL    HCT 38.7 36.0 - 48.0 %    MCV 79.1 78.0 - 100.0 FL    MCH 26.6 24.0 - 34.0 PG    MCHC 33.6 31.0 - 37.0 g/dL    RDW 12.2 11.6 - 14.5 %    PLATELET 065 030 - 227 K/uL    MPV 9.8 9.2 - 11.8 FL    NRBC 0.0 0  WBC    ABSOLUTE NRBC 0.00 0.00 - 0.01 K/uL    NEUTROPHILS 70 40 - 73 %    LYMPHOCYTES 21 21 - 52 %    MONOCYTES 8 3 - 10 %    EOSINOPHILS 2 0 - 5 %    BASOPHILS 0 0 - 2 %    IMMATURE GRANULOCYTES 0 0.0 - 0.5 %    ABS. NEUTROPHILS 8.8 (H) 1.8 - 8.0 K/UL    ABS. LYMPHOCYTES 2.6 0.9 - 3.6 K/UL    ABS. MONOCYTES 1.0 0.05 - 1.2 K/UL    ABS. EOSINOPHILS 0.2 0.0 - 0.4 K/UL    ABS. BASOPHILS 0.1 0.0 - 0.1 K/UL    ABS. IMM.  GRANS. 0.1 (H) 0.00 - 0.04 K/UL    DF AUTOMATED     METABOLIC PANEL, COMPREHENSIVE    Collection Time: 06/21/22 12:24 PM   Result Value Ref Range    Sodium 134 (L) 136 - 145 mmol/L    Potassium 3.4 (L) 3.5 - 5.5 mmol/L    Chloride 99 (L) 100 - 111 mmol/L    CO2 27 21 - 32 mmol/L    Anion gap 8 3.0 - 18 mmol/L    Glucose 280 (H) 74 - 99 mg/dL    BUN 47 (H) 7.0 - 18 MG/DL    Creatinine 2.47 (H) 0.6 - 1.3 MG/DL    BUN/Creatinine ratio 19 12 - 20      GFR est AA 35 (L) >60 ml/min/1.73m2    GFR est non-AA 28 (L) >60 ml/min/1.73m2    Calcium 9.3 8.5 - 10.1 MG/DL Bilirubin, total 0.5 0.2 - 1.0 MG/DL    ALT (SGPT) 26 16 - 61 U/L    AST (SGOT) 11 10 - 38 U/L    Alk. phosphatase 128 (H) 45 - 117 U/L    Protein, total 7.8 6.4 - 8.2 g/dL    Albumin 3.9 3.4 - 5.0 g/dL    Globulin 3.9 2.0 - 4.0 g/dL    A-G Ratio 1.0 0.8 - 1.7     TROPONIN-HIGH SENSITIVITY    Collection Time: 06/21/22 12:24 PM   Result Value Ref Range    Troponin-High Sensitivity 10 0 - 78 ng/L   LIPASE    Collection Time: 06/21/22 12:24 PM   Result Value Ref Range    Lipase 118 73 - 393 U/L   ETHYL ALCOHOL    Collection Time: 06/21/22 12:24 PM   Result Value Ref Range    ALCOHOL(ETHYL),SERUM <3 0 - 3 MG/DL   TROPONIN-HIGH SENSITIVITY    Collection Time: 06/21/22  1:30 PM   Result Value Ref Range    Troponin-High Sensitivity 10 0 - 78 ng/L   URINALYSIS W/ RFLX MICROSCOPIC    Collection Time: 06/21/22  3:27 PM   Result Value Ref Range    Color YELLOW      Appearance CLEAR      Specific gravity 1.013 1.005 - 1.030      pH (UA) 5.0 5.0 - 8.0      Protein Negative NEG mg/dL    Glucose 100 (A) NEG mg/dL    Ketone Negative NEG mg/dL    Bilirubin Negative NEG      Blood Negative NEG      Urobilinogen 0.2 0.2 - 1.0 EU/dL    Nitrites Negative NEG      Leukocyte Esterase Negative NEG         Radiologic Studies -   US GALLBLADDER   Final Result      No gallstones or secondary findings of cholecystitis. XR CHEST PORT   Final Result      No acute findings in the chest.         CT Results  (Last 48 hours)    None        CXR Results  (Last 48 hours)               06/21/22 1243  XR CHEST PORT Final result    Impression:      No acute findings in the chest.        Narrative:  EXAM: XR CHEST PORT       CLINICAL INDICATION/HISTORY: epigastric pain   -Additional: None       COMPARISON: 1/7/2022       TECHNIQUE: Frontal view of the chest       _______________       FINDINGS:       HEART AND MEDIASTINUM: Normal cardiac size and mediastinal contours.        LUNGS AND PLEURAL SPACES: No focal pneumonic consolidation, pneumothorax, or   pleural effusion. BONY THORAX AND SOFT TISSUES: No acute osseous abnormality       _______________                 Medications given in the ED-  Medications   sodium chloride 0.9 % bolus infusion 1,000 mL (0 mL IntraVENous IV Completed 6/21/22 1444)   ondansetron (ZOFRAN) injection 4 mg (4 mg IntraVENous Given 6/21/22 1233)   famotidine (PF) (PEPCID) injection 20 mg (20 mg IntraVENous Given 6/21/22 1233)   sodium chloride 0.9 % bolus infusion 1,000 mL (0 mL IntraVENous IV Completed 6/21/22 1552)         Medical Decision Making   I am the first provider for this patient. I reviewed the vital signs, available nursing notes, past medical history, past surgical history, family history and social history. Vital Signs-Reviewed the patient's vital signs. Pulse Oximetry Analysis - 100% on room air, not hypoxic     Cardiac Monitor:  Rate: 97 bpm  Rhythm: Normal sinus    EKG interpretation: (Preliminary)  EKG read by Dr. Denis Purcell at 12:27 PM  Normal sinus rhythm at a rate of 99 bpm, KY interval 140 ms, QRS duration 78 ms, similar compared to prior from January 2022    Repeat EKG interpretation: (Preliminary)  EKG read by Dr. Denis Purcell at 2:46 PM  Normal sinus rhythm at a rate of 95 bpm, KY interval 120 ms, QRS duration 80 ms, similar compared to prior from earlier today    Records Reviewed: Nursing Notes, Old Medical Records and Previous electrocardiograms    Provider Notes (Medical Decision Making): Barbara London is a 41 y.o. male presenting for 6 days of epigastric pain with nausea and vomiting. Patient's nausea and vomiting resolved here with medical management and is now tolerating p.o. Labs are benign including high sensitive troponin x2. Ultrasound of right upper quadrant without acute process involving the gallbladder. Suspect is related to patient's underlying GERD. Patient's CKD is at this baseline.   We will add additional symptomatic management for his GERD and discharged with referral to primary care and gastroenterology for follow-up with return precautions. Patient understands and agrees with this plan. Procedures:  Procedures    ED Course:   3:52 PM  Updated patient on all results and plan. All questions answered. Diagnosis and Disposition     Critical Care: None    DISCHARGE NOTE:    Shoaib Varghese's  results have been reviewed with him. He has been counseled regarding his diagnosis, treatment, and plan. He verbally conveys understanding and agreement of the signs, symptoms, diagnosis, treatment and prognosis and additionally agrees to follow up as discussed. He also agrees with the care-plan and conveys that all of his questions have been answered. I have also provided discharge instructions for him that include: educational information regarding their diagnosis and treatment, and list of reasons why they would want to return to the ED prior to their follow-up appointment, should his condition change. He has been provided with education for proper emergency department utilization. CLINICAL IMPRESSION:    1. Abdominal pain, epigastric    2. Chronic kidney disease, unspecified CKD stage        PLAN:  1. D/C Home  2. Current Discharge Medication List      START taking these medications    Details   ondansetron (ZOFRAN ODT) 8 mg disintegrating tablet Take 1 Tablet by mouth every eight (8) hours as needed for Nausea for up to 12 doses. Qty: 12 Tablet, Refills: 0  Start date: 6/21/2022      sucralfate (CARAFATE) 100 mg/mL suspension Take 5 mL by mouth four (4) times daily as needed for GI Pain. Qty: 414 mL, Refills: 0  Start date: 6/21/2022           3.    Follow-up Information     Follow up With Specialties Details Why Contact Edel Nieves NP Nurse Practitioner Schedule an appointment as soon as possible for a visit   [address] [place] [address]  [phone]      Dean Rivera MD Gastroenterology Schedule an appointment as soon as possible for a visit   [address] Negin Garay [address] [address] [phone]      [facility] EMERGENCY DEPT Emergency Medicine  If symptoms worsen [address] [place] [id number] [phone]        _______________________________      Please note that this dictation was completed with [employer], the computer voice recognition software. Quite often unanticipated grammatical, syntax, homophones, and other interpretive errors are inadvertently transcribed by the computer software. Please disregard these errors. Please excuse any errors that have escaped final proofreading.

## 2022-06-21 NOTE — ED TRIAGE NOTES
P states \" I am having a lot of refux and I have been feeling bad since last wednesday with nausea & vomiting.